# Patient Record
Sex: MALE | Race: WHITE | Employment: OTHER | URBAN - METROPOLITAN AREA
[De-identification: names, ages, dates, MRNs, and addresses within clinical notes are randomized per-mention and may not be internally consistent; named-entity substitution may affect disease eponyms.]

---

## 2017-01-05 ENCOUNTER — PATIENT OUTREACH (OUTPATIENT)
Dept: CASE MANAGEMENT | Age: 67
End: 2017-01-05

## 2017-03-01 PROBLEM — I47.2 VENTRICULAR TACHYARRHYTHMIA (HCC): Status: ACTIVE | Noted: 2017-03-01

## 2017-03-01 PROBLEM — I47.20 VENTRICULAR TACHYARRHYTHMIA (HCC): Status: ACTIVE | Noted: 2017-03-01

## 2017-03-01 PROBLEM — I47.20 VENTRICULAR TACHYARRHYTHMIA: Status: ACTIVE | Noted: 2017-03-01

## 2017-03-01 PROBLEM — Z95.810 CARDIAC DEFIBRILLATOR IN PLACE: Status: ACTIVE | Noted: 2017-03-01

## 2017-03-01 PROBLEM — I42.0 DCM (DILATED CARDIOMYOPATHY) (HCC): Status: ACTIVE | Noted: 2017-03-01

## 2017-04-06 ENCOUNTER — HOSPITAL ENCOUNTER (OUTPATIENT)
Dept: CV DIAGNOSTICS | Facility: HOSPITAL | Age: 67
Discharge: HOME | End: 2017-04-06
Attending: INTERNAL MEDICINE
Payer: MEDICARE

## 2017-04-06 DIAGNOSIS — Z95.810 CARDIAC DEFIBRILLATOR IN PLACE: ICD-10-CM

## 2017-04-06 DIAGNOSIS — G35 MULTIPLE SCLEROSIS (HCC): ICD-10-CM

## 2017-04-06 DIAGNOSIS — I42.0 DCM (DILATED CARDIOMYOPATHY) (HCC): ICD-10-CM

## 2017-04-06 DIAGNOSIS — I47.2 VENTRICULAR TACHYARRHYTHMIA (HCC): ICD-10-CM

## 2017-04-06 DIAGNOSIS — I25.10 ATHEROSCLEROSIS OF NATIVE CORONARY ARTERY OF NATIVE HEART WITHOUT ANGINA PECTORIS: ICD-10-CM

## 2017-04-06 PROCEDURE — 93306 TTE W/DOPPLER COMPLETE: CPT

## 2017-04-06 PROCEDURE — 93306 TTE W/DOPPLER COMPLETE: CPT | Performed by: INTERNAL MEDICINE

## 2017-05-19 ENCOUNTER — APPOINTMENT (OUTPATIENT)
Dept: INTERVENTIONAL RADIOLOGY/VASCULAR | Facility: HOSPITAL | Age: 67
DRG: 247 | End: 2017-05-19
Attending: INTERNAL MEDICINE
Payer: MEDICARE

## 2017-05-19 ENCOUNTER — APPOINTMENT (OUTPATIENT)
Dept: CV DIAGNOSTICS | Facility: HOSPITAL | Age: 67
DRG: 247 | End: 2017-05-19
Attending: INTERNAL MEDICINE
Payer: MEDICARE

## 2017-05-19 ENCOUNTER — APPOINTMENT (OUTPATIENT)
Dept: GENERAL RADIOLOGY | Facility: HOSPITAL | Age: 67
DRG: 247 | End: 2017-05-19
Attending: STUDENT IN AN ORGANIZED HEALTH CARE EDUCATION/TRAINING PROGRAM
Payer: MEDICARE

## 2017-05-19 ENCOUNTER — HOSPITAL ENCOUNTER (INPATIENT)
Facility: HOSPITAL | Age: 67
LOS: 2 days | Discharge: HOME | DRG: 247 | End: 2017-05-21
Attending: STUDENT IN AN ORGANIZED HEALTH CARE EDUCATION/TRAINING PROGRAM | Admitting: HOSPITALIST
Payer: MEDICARE

## 2017-05-19 DIAGNOSIS — R07.9 ACUTE CHEST PAIN: Primary | ICD-10-CM

## 2017-05-19 DIAGNOSIS — R06.00 DYSPNEA, UNSPECIFIED TYPE: ICD-10-CM

## 2017-05-19 DIAGNOSIS — R77.8 ELEVATED TROPONIN: ICD-10-CM

## 2017-05-19 PROBLEM — R79.89 ELEVATED TROPONIN: Status: ACTIVE | Noted: 2017-05-19

## 2017-05-19 PROBLEM — Z91.81 AT RISK FOR FALLING: Status: ACTIVE | Noted: 2017-05-19

## 2017-05-19 PROCEDURE — B2121ZZ FLUOROSCOPY OF SINGLE CORONARY ARTERY BYPASS GRAFT USING LOW OSMOLAR CONTRAST: ICD-10-PCS | Performed by: INTERNAL MEDICINE

## 2017-05-19 PROCEDURE — 93306 TTE W/DOPPLER COMPLETE: CPT | Performed by: INTERNAL MEDICINE

## 2017-05-19 PROCEDURE — 71010 XR CHEST AP PORTABLE  (CPT=71010): CPT | Performed by: STUDENT IN AN ORGANIZED HEALTH CARE EDUCATION/TRAINING PROGRAM

## 2017-05-19 PROCEDURE — 4A023N7 MEASUREMENT OF CARDIAC SAMPLING AND PRESSURE, LEFT HEART, PERCUTANEOUS APPROACH: ICD-10-PCS | Performed by: INTERNAL MEDICINE

## 2017-05-19 PROCEDURE — 3E033GC INTRODUCTION OF OTHER THERAPEUTIC SUBSTANCE INTO PERIPHERAL VEIN, PERCUTANEOUS APPROACH: ICD-10-PCS | Performed by: INTERNAL MEDICINE

## 2017-05-19 PROCEDURE — 027135Z DILATION OF CORONARY ARTERY, TWO ARTERIES WITH TWO DRUG-ELUTING INTRALUMINAL DEVICES, PERCUTANEOUS APPROACH: ICD-10-PCS | Performed by: INTERNAL MEDICINE

## 2017-05-19 PROCEDURE — B2181ZZ FLUOROSCOPY OF LEFT INTERNAL MAMMARY BYPASS GRAFT USING LOW OSMOLAR CONTRAST: ICD-10-PCS | Performed by: INTERNAL MEDICINE

## 2017-05-19 PROCEDURE — 99223 1ST HOSP IP/OBS HIGH 75: CPT | Performed by: INTERNAL MEDICINE

## 2017-05-19 PROCEDURE — B2111ZZ FLUOROSCOPY OF MULTIPLE CORONARY ARTERIES USING LOW OSMOLAR CONTRAST: ICD-10-PCS | Performed by: INTERNAL MEDICINE

## 2017-05-19 RX ORDER — HYDROCODONE BITARTRATE AND ACETAMINOPHEN 5; 325 MG/1; MG/1
2 TABLET ORAL EVERY 4 HOURS PRN
Status: DISCONTINUED | OUTPATIENT
Start: 2017-05-19 | End: 2017-05-21

## 2017-05-19 RX ORDER — SODIUM PHOSPHATE, DIBASIC AND SODIUM PHOSPHATE, MONOBASIC 7; 19 G/133ML; G/133ML
1 ENEMA RECTAL ONCE AS NEEDED
Status: DISCONTINUED | OUTPATIENT
Start: 2017-05-19 | End: 2017-05-21

## 2017-05-19 RX ORDER — ASPIRIN 325 MG
325 TABLET ORAL DAILY
Status: DISCONTINUED | OUTPATIENT
Start: 2017-05-19 | End: 2017-05-21

## 2017-05-19 RX ORDER — GABAPENTIN 600 MG/1
600 TABLET ORAL DAILY
Status: DISCONTINUED | OUTPATIENT
Start: 2017-05-19 | End: 2017-05-19

## 2017-05-19 RX ORDER — AMIODARONE HYDROCHLORIDE 100 MG/1
100 TABLET ORAL DAILY
Status: DISCONTINUED | OUTPATIENT
Start: 2017-05-19 | End: 2017-05-21

## 2017-05-19 RX ORDER — MORPHINE SULFATE 2 MG/ML
INJECTION, SOLUTION INTRAMUSCULAR; INTRAVENOUS
Status: COMPLETED
Start: 2017-05-19 | End: 2017-05-19

## 2017-05-19 RX ORDER — MORPHINE SULFATE 4 MG/ML
4 INJECTION, SOLUTION INTRAMUSCULAR; INTRAVENOUS ONCE
Status: COMPLETED | OUTPATIENT
Start: 2017-05-19 | End: 2017-05-19

## 2017-05-19 RX ORDER — DOCUSATE SODIUM 100 MG/1
100 CAPSULE, LIQUID FILLED ORAL DAILY
Status: ON HOLD | COMMUNITY
End: 2017-05-19

## 2017-05-19 RX ORDER — MELATONIN
200 DAILY
Status: DISCONTINUED | OUTPATIENT
Start: 2017-05-19 | End: 2017-05-19

## 2017-05-19 RX ORDER — FUROSEMIDE 20 MG/1
40 TABLET ORAL DAILY
Status: ON HOLD | COMMUNITY
End: 2017-05-21

## 2017-05-19 RX ORDER — CLOPIDOGREL BISULFATE 75 MG/1
TABLET ORAL
Status: COMPLETED
Start: 2017-05-19 | End: 2017-05-19

## 2017-05-19 RX ORDER — HEPARIN SODIUM 5000 [USP'U]/ML
5000 INJECTION INTRAVENOUS; SUBCUTANEOUS ONCE
Status: COMPLETED | OUTPATIENT
Start: 2017-05-19 | End: 2017-05-19

## 2017-05-19 RX ORDER — ACETAMINOPHEN AND CODEINE PHOSPHATE 300; 30 MG/1; MG/1
1 TABLET ORAL EVERY 6 HOURS PRN
Status: ON HOLD | COMMUNITY
End: 2017-05-19

## 2017-05-19 RX ORDER — MORPHINE SULFATE 4 MG/ML
4 INJECTION, SOLUTION INTRAMUSCULAR; INTRAVENOUS EVERY 2 HOUR PRN
Status: DISCONTINUED | OUTPATIENT
Start: 2017-05-19 | End: 2017-05-21

## 2017-05-19 RX ORDER — NITROGLYCERIN 0.4 MG/1
0.4 TABLET SUBLINGUAL EVERY 5 MIN PRN
Status: COMPLETED | OUTPATIENT
Start: 2017-05-19 | End: 2017-05-19

## 2017-05-19 RX ORDER — MELATONIN
200 DAILY
Status: DISCONTINUED | OUTPATIENT
Start: 2017-05-19 | End: 2017-05-21

## 2017-05-19 RX ORDER — LIDOCAINE HYDROCHLORIDE 10 MG/ML
INJECTION, SOLUTION INFILTRATION; PERINEURAL
Status: COMPLETED
Start: 2017-05-19 | End: 2017-05-19

## 2017-05-19 RX ORDER — NITROGLYCERIN 0.4 MG/1
0.4 TABLET SUBLINGUAL EVERY 5 MIN PRN
Status: DISCONTINUED | OUTPATIENT
Start: 2017-05-19 | End: 2017-05-21

## 2017-05-19 RX ORDER — NITROGLYCERIN 20 MG/100ML
INJECTION INTRAVENOUS
Status: DISCONTINUED | OUTPATIENT
Start: 2017-05-19 | End: 2017-05-21

## 2017-05-19 RX ORDER — SODIUM CHLORIDE 9 MG/ML
INJECTION, SOLUTION INTRAVENOUS CONTINUOUS
Status: DISCONTINUED | OUTPATIENT
Start: 2017-05-19 | End: 2017-05-21

## 2017-05-19 RX ORDER — MORPHINE SULFATE 2 MG/ML
1 INJECTION, SOLUTION INTRAMUSCULAR; INTRAVENOUS EVERY 2 HOUR PRN
Status: DISCONTINUED | OUTPATIENT
Start: 2017-05-19 | End: 2017-05-21

## 2017-05-19 RX ORDER — LOSARTAN POTASSIUM 25 MG/1
25 TABLET ORAL DAILY
COMMUNITY
End: 2017-11-08

## 2017-05-19 RX ORDER — ASPIRIN 325 MG
325 TABLET ORAL DAILY
Qty: 30 TABLET | Refills: 0 | Status: SHIPPED | COMMUNITY
Start: 2017-05-19 | End: 2019-09-18

## 2017-05-19 RX ORDER — MELATONIN
600 DAILY
COMMUNITY
End: 2018-09-07 | Stop reason: ALTCHOICE

## 2017-05-19 RX ORDER — HEPARIN SODIUM 5000 [USP'U]/ML
INJECTION, SOLUTION INTRAVENOUS; SUBCUTANEOUS
Status: COMPLETED
Start: 2017-05-19 | End: 2017-05-19

## 2017-05-19 RX ORDER — AMIODARONE HYDROCHLORIDE 200 MG/1
200 TABLET ORAL DAILY
Status: ON HOLD | COMMUNITY
End: 2017-05-21

## 2017-05-19 RX ORDER — SODIUM CHLORIDE 9 MG/ML
INJECTION, SOLUTION INTRAVENOUS CONTINUOUS
Status: ACTIVE | OUTPATIENT
Start: 2017-05-19 | End: 2017-05-19

## 2017-05-19 RX ORDER — METOPROLOL TARTRATE 50 MG/1
50 TABLET, FILM COATED ORAL
Status: DISCONTINUED | OUTPATIENT
Start: 2017-05-19 | End: 2017-05-20

## 2017-05-19 RX ORDER — ACETAMINOPHEN 325 MG/1
650 TABLET ORAL EVERY 4 HOURS PRN
Status: DISCONTINUED | OUTPATIENT
Start: 2017-05-19 | End: 2017-05-21

## 2017-05-19 RX ORDER — SODIUM CHLORIDE 9 MG/ML
INJECTION, SOLUTION INTRAVENOUS ONCE
Status: COMPLETED | OUTPATIENT
Start: 2017-05-19 | End: 2017-05-19

## 2017-05-19 RX ORDER — MIDAZOLAM HYDROCHLORIDE 1 MG/ML
INJECTION INTRAMUSCULAR; INTRAVENOUS
Status: COMPLETED
Start: 2017-05-19 | End: 2017-05-19

## 2017-05-19 RX ORDER — POTASSIUM CHLORIDE 20 MEQ/1
40 TABLET, EXTENDED RELEASE ORAL EVERY 4 HOURS
Status: COMPLETED | OUTPATIENT
Start: 2017-05-19 | End: 2017-05-19

## 2017-05-19 RX ORDER — CLOPIDOGREL BISULFATE 75 MG/1
75 TABLET ORAL DAILY
Status: DISCONTINUED | OUTPATIENT
Start: 2017-05-20 | End: 2017-05-21

## 2017-05-19 RX ORDER — HEPARIN SODIUM AND DEXTROSE 10000; 5 [USP'U]/100ML; G/100ML
1000 INJECTION INTRAVENOUS ONCE
Status: COMPLETED | OUTPATIENT
Start: 2017-05-19 | End: 2017-05-19

## 2017-05-19 RX ORDER — ASPIRIN 325 MG
325 TABLET ORAL DAILY
Status: DISCONTINUED | OUTPATIENT
Start: 2017-05-19 | End: 2017-05-19

## 2017-05-19 RX ORDER — CLOPIDOGREL BISULFATE 75 MG/1
75 TABLET ORAL DAILY
Qty: 30 TABLET | Refills: 11 | Status: SHIPPED | OUTPATIENT
Start: 2017-05-20 | End: 2018-07-09

## 2017-05-19 RX ORDER — ACETAMINOPHEN 500 MG
1000 TABLET ORAL ONCE
Status: COMPLETED | OUTPATIENT
Start: 2017-05-19 | End: 2017-05-19

## 2017-05-19 RX ORDER — FUROSEMIDE 40 MG/1
40 TABLET ORAL
Status: DISCONTINUED | OUTPATIENT
Start: 2017-05-19 | End: 2017-05-21

## 2017-05-19 RX ORDER — ACETAMINOPHEN 325 MG/1
650 TABLET ORAL EVERY 6 HOURS PRN
Status: DISCONTINUED | OUTPATIENT
Start: 2017-05-19 | End: 2017-05-21

## 2017-05-19 RX ORDER — ATORVASTATIN CALCIUM 20 MG/1
20 TABLET, FILM COATED ORAL NIGHTLY
Status: DISCONTINUED | OUTPATIENT
Start: 2017-05-19 | End: 2017-05-19

## 2017-05-19 RX ORDER — ONDANSETRON 2 MG/ML
4 INJECTION INTRAMUSCULAR; INTRAVENOUS ONCE
Status: COMPLETED | OUTPATIENT
Start: 2017-05-19 | End: 2017-05-19

## 2017-05-19 RX ORDER — POLYETHYLENE GLYCOL 3350 17 G/17G
17 POWDER, FOR SOLUTION ORAL DAILY PRN
Status: DISCONTINUED | OUTPATIENT
Start: 2017-05-19 | End: 2017-05-21

## 2017-05-19 RX ORDER — ASPIRIN 81 MG/1
324 TABLET, CHEWABLE ORAL ONCE
Status: DISCONTINUED | OUTPATIENT
Start: 2017-05-19 | End: 2017-05-19

## 2017-05-19 RX ORDER — ATORVASTATIN CALCIUM 20 MG/1
20 TABLET, FILM COATED ORAL NIGHTLY
Status: ON HOLD | COMMUNITY
End: 2017-05-19

## 2017-05-19 RX ORDER — LOSARTAN POTASSIUM 25 MG/1
25 TABLET ORAL
Status: DISCONTINUED | OUTPATIENT
Start: 2017-05-19 | End: 2017-05-21

## 2017-05-19 RX ORDER — BISACODYL 10 MG
10 SUPPOSITORY, RECTAL RECTAL
Status: DISCONTINUED | OUTPATIENT
Start: 2017-05-19 | End: 2017-05-21

## 2017-05-19 RX ORDER — DOCUSATE SODIUM 100 MG/1
100 CAPSULE, LIQUID FILLED ORAL 2 TIMES DAILY PRN
Status: DISCONTINUED | OUTPATIENT
Start: 2017-05-19 | End: 2017-05-21

## 2017-05-19 RX ORDER — ONDANSETRON 2 MG/ML
4 INJECTION INTRAMUSCULAR; INTRAVENOUS EVERY 6 HOURS PRN
Status: DISCONTINUED | OUTPATIENT
Start: 2017-05-19 | End: 2017-05-21

## 2017-05-19 RX ORDER — HYDROCODONE BITARTRATE AND ACETAMINOPHEN 5; 325 MG/1; MG/1
1 TABLET ORAL EVERY 4 HOURS PRN
Status: DISCONTINUED | OUTPATIENT
Start: 2017-05-19 | End: 2017-05-21

## 2017-05-19 RX ORDER — MORPHINE SULFATE 2 MG/ML
2 INJECTION, SOLUTION INTRAMUSCULAR; INTRAVENOUS EVERY 2 HOUR PRN
Status: DISCONTINUED | OUTPATIENT
Start: 2017-05-19 | End: 2017-05-21

## 2017-05-19 RX ORDER — GABAPENTIN 600 MG/1
600 TABLET ORAL DAILY
Status: ON HOLD | COMMUNITY
End: 2017-05-19

## 2017-05-19 RX ORDER — HEPARIN SODIUM 5000 [USP'U]/ML
5000 INJECTION, SOLUTION INTRAVENOUS; SUBCUTANEOUS EVERY 8 HOURS SCHEDULED
Status: CANCELLED | OUTPATIENT
Start: 2017-05-19

## 2017-05-19 RX ORDER — ACEBUTOLOL HYDROCHLORIDE 200 MG/1
200 CAPSULE ORAL 3 TIMES DAILY
Status: ON HOLD | COMMUNITY
End: 2017-05-21

## 2017-05-19 RX ORDER — HEPARIN SODIUM AND DEXTROSE 10000; 5 [USP'U]/100ML; G/100ML
INJECTION INTRAVENOUS CONTINUOUS
Status: DISCONTINUED | OUTPATIENT
Start: 2017-05-19 | End: 2017-05-20

## 2017-05-19 RX ORDER — ATORVASTATIN CALCIUM 40 MG/1
40 TABLET, FILM COATED ORAL NIGHTLY
Status: DISCONTINUED | OUTPATIENT
Start: 2017-05-19 | End: 2017-05-21

## 2017-05-19 RX ORDER — AMIODARONE HYDROCHLORIDE 100 MG/1
100 TABLET ORAL DAILY
Status: DISCONTINUED | OUTPATIENT
Start: 2017-05-19 | End: 2017-05-19

## 2017-05-19 NOTE — DIETARY NOTE
Nutrition Short Note     Dietitian consult received per cardiac rehab standing order. Pt to be educated by cardiac rehab staff and encouraged to attend outpatient classes taught by RD. RD available PRN.     Debbie Hinton  Dietetic Intern

## 2017-05-19 NOTE — ED INITIAL ASSESSMENT (HPI)
Arrived via EMS for c/o CP that woke him up PTA. Also reports ALLISON.  Pt given Nitro SL with some relief

## 2017-05-19 NOTE — H&P
[facility] HOSPITALIST  History and Physical     River Salinas Patient Status:  Emergency    10/16/1950 MRN [id number]   Location [facility] Attending Collin Barnett MD   Hosp Day # 0 PCP Janeth Amos MD     Chief Complai Peptic ulcer, unspecified site, unspecified as acute or chronic, without mention of hemorrhage, perforation, or obstruction 6/29/2012        Past Surgical History:     Past Surgical History    APPENDECTOMY  1991    CABG  1998    SURGICAL STENT  2010    INJ Jesus Reyes MD;  Location: [address] [place] [address] NDL/CATH SPI DX/THER NJX  8/14/2013    Comment Procedure: LUMBAR EPIDURAL;  Surgeon: Yuridia Varner MD;  Location: [address] [place] [address] (GLUCOSAMINE-CHONDROITIN) Oral Tab Take 1 capsule by mouth 2 (two) times daily. Disp:  Rfl:    furosemide 20 MG Oral Tab Take 2 tablets (40 mg total) by mouth once daily.  Disp: 180 tablet Rfl: 3   amiodarone HCl 200 MG Oral Tab Take 0.5 tablets (100 mg tot mood and affect.       Diagnostic Data:      Labs:  Recent Labs   Lab  05/19/17   0302   WBC  11.8   HGB  14.8   MCV  93.9   PLT  240.0   INR  0.99       Recent Labs   Lab  05/19/17   0302   GLU  95   BUN  15   CREATSERUM  1.11   CA  8.9   ALB  3.9   NA  14

## 2017-05-19 NOTE — BH RN ADMISSION NOTE
Received patient from ED, accompanied by his wife. He is alert and oriented. Complaining of chest pressure of 3/10. He is on nitro drip at 15mcg/min., heparin drip and on O2. Morphine for pain was given. Needs attended.

## 2017-05-19 NOTE — CONSULTS
[facility] Cardiology  Report of Consultation    Maki Bo Patient Status:  Observation    10/16/1950 MRN [id number]   [facility] 2NE-A Attending Alvaro Lainez MD   Hosp Day # 0 PCP Naila Soni MD     Reason for Consu reference range     Chronic atrial fibrillation (HCC)     Chronic bilateral back pain     Obesity     Hypokalemia     Elevated troponin     Dyspnea, unspecified type     At risk for falling      Social History:   Smoking:  None  Alcohol:  None    Family Hi daily.  ) Disp: 45 tablet Rfl: 3   Acebutolol HCl 200 MG Oral Cap TAKE ONE CAPSULE BY MOUTH THREE TIMES DAILY Disp: 270 capsule Rfl: 3   Calcium Carbonate (CALCIUM 600 OR) Take 1 Tab by mouth daily.  Disp:  Rfl:    Natalizumab (TYSABRI IV) Inject  into the symmetric fashion. Neurologic: Alert and oriented, normal affect. No gross deficit appreciated. Integument:  No visible rashes are appreciated.       Laboratories and Data:   Labs:    Recent Labs   Lab  05/19/17   0302   GLU  95   BUN  15   CREATSERUM  1 K 3.5 05/19/2017    05/19/2017   CO2 25.0 05/19/2017   GLU 95 05/19/2017   CA 8.9 05/19/2017   ALB 3.9 05/19/2017   ALKPHO 95 05/19/2017   BILT 0.5 05/19/2017   TP 7.3 05/19/2017   AST 36 05/19/2017   ALT 36 05/19/2017   PTT 29.8 05/19/2017   INR 0

## 2017-05-19 NOTE — PROGRESS NOTES
Pt seen and examined. Feels ok. Minimal pain. Echo today. Possible cath. Cont. Heparin/nitro and PRN morphine.     Claire Sommers MD

## 2017-05-19 NOTE — ED PROVIDER NOTES
Patient Seen in: [facility] Emergency Department    History   Patient presents with:  Chest Pain Angina (cardiovascular)    Stated Complaint: c/o CP which woke him up    HPI    Patient is a 24-year-old male presenting to emergency department with ch 2010    INJECTION, ANESTHETIC/STEROID, TRANSFORAMINAL EPIDURAL; LUMBAR/SACRAL, SINGLE LEVEL  7/12/2013    Comment Procedure: TRANSFORAMINAL EPIDURAL - LUMBAR;  Surgeon: Cherylene Deters, MD;  Location: [address] [place] [address] PREOPERATIVE ORDER FOR IV ANTIBIOTIC SURGICAL SITE INFECTION PROPHYLAXIS.   8/14/2013    Comment Procedure: LUMBAR EPIDURAL;  Surgeon: Yuridia Varner MD;  Location: [facility] FOR PAIN MANAGEMENT    PATIENT DOCUMENTED NOT TO HAVE EXPERIENCED ANY OF THE FOL CR,  Take 1 Tab by mouth 2 (two) times daily. Cholecalciferol (VITAMIN D OR),  Take 4,000 Units by mouth. FOLIC ACID OR,  675 mg daily.        Family History   Problem Relation Age of Onset   • Arthritis Mother    • Heart Disease Father       and no mass. There is no tenderness. There is no rebound and no guarding. Musculoskeletal: Normal range of motion, no edema. Neurological: alert and oriented to person, place, and time. Skin: Skin is warm and dry. No rash noted.    Psychiatric: normal axis, nonspecific ST-T wave abnormality noted without significant change from EKG performed on September 29, 2012. EKG    Rate, intervals and axes as noted on EKG Report.   Rate: 67  Rhythm: Sinus Rhythm  Reading: Normal sinus rhythm with occasiona Unknown

## 2017-05-19 NOTE — PROGRESS NOTES
05/19/17 0600   Provider Notification   Reason for Communication Critical value  (chest pressure)   Provider Name Dr. Monica Rojas   Method of Communication Page   Response (answered by delfina calhoun. venkatesh pt. Jose Cruz Salguero. )   Notification Time 0647

## 2017-05-19 NOTE — PROGRESS NOTES
Cath:    LM: Mild disease. LAD: Occluded. Fills via GARY. RCA: Occluded. CX: Dominant. Ruptured proximal plaque, 75% stenosis, then 95% into OM1 (large branch), then AV groove CX large to left TIGRE/PDA. SVG: Occluded. GARY: Patent to moderate LAD.     P

## 2017-05-20 PROCEDURE — 99232 SBSQ HOSP IP/OBS MODERATE 35: CPT | Performed by: HOSPITALIST

## 2017-05-20 RX ORDER — ATORVASTATIN CALCIUM 40 MG/1
40 TABLET, FILM COATED ORAL NIGHTLY
Qty: 30 TABLET | Refills: 1 | Status: SHIPPED | OUTPATIENT
Start: 2017-05-20 | End: 2017-07-12

## 2017-05-20 NOTE — PROGRESS NOTES
[facility] HOSPITALIST  Progress Note     Karendaniellaruben Marcum Patient Status:  Inpatient    10/16/1950 MRN [id number]   [facility] 2NE-A Attending Javad Cano MD   Hosp Day # 1 PCP Ash Palacios MD     Chief Complaint: NSTEMI    S: Patient f • furosemide  40 mg Oral Daily   • Losartan Potassium  25 mg Oral Daily   • folic acid  754 mcg Oral Daily   • aspirin  325 mg Oral Daily   • amiodarone HCl  100 mg Oral Daily   • Atorvastatin Calcium  40 mg Oral Nightly   • Clopidogrel Bisulfate  75 mg

## 2017-05-20 NOTE — PHYSICAL THERAPY NOTE
PHYSICAL THERAPY QUICK EVALUATION - INPATIENT    Room Number: 9504/0703-Z  Evaluation Date: 5/20/2017  Presenting Problem: Acute Chest Pain, NSTEMI, s/p stenting  Physician Order: PT Eval and Treat    Problem List  Principal Problem:    Acute chest pain  A TRANSFORAMINAL EPIDURAL - LUMBAR;  Surgeon: Judi White MD;  Location: [facility] FOR PAIN MANAGEMENT    PATIENT DOCUMENTED NOT TO HAVE EXPERIENCED ANY OF THE FOLLOWING EVENTS  7/12/2013    Comment Procedure: TRANSFORAMINAL EPIDURAL - LUMBAR;  Surgeon: Go[facility] FOR PAIN MANAGEMENT    CARDIAC DEFIBRILLATOR PLACEMENT      Comment [employer], not pacer dependent.   Magnet response inhibit therapy    OPEN HEART SURGERY (PBP)      Comment 1998 4CABG X 4    COLONOSCOPY  1/2016    Comment Dr Alexander Meyer due in 5 y None   -   Moving from lying on back to sitting on the side of the bed?: A Little   How much help from another person does the patient currently need. ..   -   Moving to and from a bed to a chair (including a wheelchair)?: None   -   Need to walk in hospita that patients with this level of impairment may be safe for discharge home. The patient ambulates with self-selected gait speed = .45 meters/second.  This correlates with limited community ambulator status and need for intervention in order to reduce fall

## 2017-05-20 NOTE — CARDIAC REHAB
Cardiac rehab stent education completed with patient. Stent card to be mailed to patient. Will call for cardiac rehab appointment.

## 2017-05-20 NOTE — RESPIRATORY THERAPY NOTE
DON - Equipment Use Daily Summary:                  . Set Mode: CPAP                . Usage in hours: 1:49                . 90% Pressure (EPAP) level: 14                . 90% Insp. Pressure (IPAP): Varinder Mckee AHI: 19                .  Supplemental Oxyg

## 2017-05-20 NOTE — PROGRESS NOTES
[facility] Cardiology  Progress Note    Belynda Forward Patient Status:  Inpatient    10/16/1950 MRN [id number]   [facility] 2NE-A Attending Michelle Underwood MD   Hosp Day # 1 PCP Traci Pascual MD     Subjective:   No further ch General:  Well-developed / Well-nourished. No acute distress. HEENT:  Normocephalic. Atraumatic. No icterus. Neck:  There is no jugular venous distention. Cardiovascular:  Cardiovascular examination demonstrates a regular rate and rhythm.   There i disease. LAD: Occluded.  Fills via GARY. RCA: Occluded. CX: Dominant.  Ruptured proximal plaque, 75% stenosis, then 95% into OM1 (large branch), then AV groove CX large to left TIGRE/PDA. SVG: Occluded. GARY: Patent to moderate LAD.     PCI: EBU.  DONNA, pro

## 2017-05-21 VITALS
BODY MASS INDEX: 38.65 KG/M2 | HEIGHT: 68 IN | RESPIRATION RATE: 18 BRPM | WEIGHT: 255 LBS | OXYGEN SATURATION: 98 % | HEART RATE: 78 BPM | DIASTOLIC BLOOD PRESSURE: 74 MMHG | TEMPERATURE: 98 F | SYSTOLIC BLOOD PRESSURE: 141 MMHG

## 2017-05-21 PROCEDURE — 99239 HOSP IP/OBS DSCHRG MGMT >30: CPT | Performed by: HOSPITALIST

## 2017-05-21 RX ORDER — AMIODARONE HYDROCHLORIDE 100 MG/1
100 TABLET ORAL DAILY
Qty: 30 TABLET | Refills: 0 | Status: SHIPPED | OUTPATIENT
Start: 2017-05-21 | End: 2017-06-02

## 2017-05-21 RX ORDER — FUROSEMIDE 40 MG/1
40 TABLET ORAL
Qty: 30 TABLET | Refills: 0 | Status: ON HOLD | OUTPATIENT
Start: 2017-05-21 | End: 2018-10-06

## 2017-05-21 NOTE — DISCHARGE PLANNING
Discharge medications and home care reviewed,scripts given for home medications; follow up appointments and groin care and complications discussed.   Verbalized understanding of information given

## 2017-05-21 NOTE — PROGRESS NOTES
[facility] Group Cardiology  Progress Note    Bere Chin Patient Status:  Inpatient    10/16/1950 MRN [id number]   [facility] 2NE-A Attending Silvana Gupta MD   Hosp Day # 2 PCP Luciana Rico MD     Subjective:   No further ch Well-developed / Well-nourished. No acute distress. HEENT:  Normocephalic. Atraumatic. No icterus. Neck:  There is no jugular venous distention. Cardiovascular:  Cardiovascular examination demonstrates a regular rate and rhythm.   There is normal S1, Occluded.  Fills via GARY. RCA: Occluded. CX: Dominant.  Ruptured proximal plaque, 75% stenosis, then 95% into OM1 (large branch), then AV groove CX large to left TIGRE/PDA. SVG: Occluded. GARY: Patent to moderate LAD.     PCI: EBU.  audelia THOMPSON.  2.5x12,

## 2017-05-21 NOTE — RESPIRATORY THERAPY NOTE
DON - Equipment Use Daily Summary:  · Set Mode CFLEX  · Usage in hours: 9:21  · 90% Pressure (EPAP) level:   · 90% Insp Pressure (IPAP): 14  · AHI: 13  · Supplemental Oxygen:2  · Comments:

## 2017-05-22 ENCOUNTER — PATIENT OUTREACH (OUTPATIENT)
Dept: CASE MANAGEMENT | Age: 67
End: 2017-05-22

## 2017-05-22 DIAGNOSIS — Z02.9 ENCOUNTERS FOR ADMINISTRATIVE PURPOSE: Primary | ICD-10-CM

## 2017-05-22 NOTE — PROGRESS NOTES
Initial Post Discharge Follow Up   Discharge Date: 5/21/17  Contact Date: 5/22/2017    Consent Verification:  Assessment Completed With: Patient  HIPAA Verified?   Yes    Discharge Dx:  Acute MI     General:   • How have you been since your discharge fro Take 1 capsule by mouth daily. Disp:  Rfl:    Calcium Carbonate (CALCIUM 600 OR) Take 1 Tab by mouth daily. Disp:  Rfl:    Natalizumab (TYSABRI IV) Inject  into the vein.  monthly Disp:  Rfl:    Multiple Vitamin (MULTI VITAMIN MENS OR) Take 1 Tab by mouth no--not ordered by cardio per pt.       Needs post D/C:   Now that you are home, are there any needs or concerns you need addressed before your next visit with your PCP?  (DME, meds, disease concerns, Etc): No     Follow up appointments:   All HFU appts re go to ER/call 911. He verbalized understanding and will contact office with any further questions or concerns. [x]  Discharge Summary, medications reviewed/discussed/and reconciled with patient, and orders reviewed and discussed.

## 2017-05-22 NOTE — DISCHARGE SUMMARY
[facility] HOSPITALIST  DISCHARGE SUMMARY     Bere Chin Patient Status:  Inpatient    10/16/1950 MRN [id number]   [facility] 2NE-A Attending No att. providers found   [facility] Day # 2 PCP Luciana Rico MD     Date of Admission: 2017 myocardial infarction. He was seen by cardiology service. He was placed on heparin and nitro. He had a cardiac catheterization on 5/19/2017. The patient had PCI to lesions in the left circumflex and OM distribution.   He was found to have ischemic cardi changed:    - medication strength  - how much to take        Take 1 tablet (40 mg total) by mouth nightly.     Quantity:  30 tablet   Refills:  1       furosemide 40 MG Tabs   Last time this was given:  40 mg on 5/21/2017  9:12 AM   Commonly known as:  PARISH prescriptions at the location directed by your doctor or nurse     Bring a paper prescription for each of these medications    - atorvastatin 40 MG Tabs  - furosemide 40 MG Tabs  - metoprolol Tartrate 25 MG Tabs      Information about where to get these me

## 2017-05-26 ENCOUNTER — OFFICE VISIT (OUTPATIENT)
Dept: FAMILY MEDICINE CLINIC | Facility: CLINIC | Age: 67
End: 2017-05-26

## 2017-05-26 VITALS
TEMPERATURE: 99 F | BODY MASS INDEX: 40.97 KG/M2 | DIASTOLIC BLOOD PRESSURE: 68 MMHG | WEIGHT: 261 LBS | RESPIRATION RATE: 16 BRPM | HEART RATE: 68 BPM | SYSTOLIC BLOOD PRESSURE: 120 MMHG | HEIGHT: 67 IN

## 2017-05-26 DIAGNOSIS — E78.00 PURE HYPERCHOLESTEROLEMIA: ICD-10-CM

## 2017-05-26 DIAGNOSIS — I10 BENIGN ESSENTIAL HTN: ICD-10-CM

## 2017-05-26 DIAGNOSIS — I21.4 NSTEMI (NON-ST ELEVATED MYOCARDIAL INFARCTION) (HCC): Primary | ICD-10-CM

## 2017-05-26 DIAGNOSIS — I50.22 SYSTOLIC CHF, CHRONIC (HCC): ICD-10-CM

## 2017-05-26 DIAGNOSIS — I42.0 DCM (DILATED CARDIOMYOPATHY) (HCC): ICD-10-CM

## 2017-05-26 DIAGNOSIS — I25.110 CORONARY ARTERY DISEASE INVOLVING NATIVE CORONARY ARTERY OF NATIVE HEART WITH UNSTABLE ANGINA PECTORIS (HCC): ICD-10-CM

## 2017-05-26 DIAGNOSIS — G35 MULTIPLE SCLEROSIS (HCC): ICD-10-CM

## 2017-05-26 DIAGNOSIS — Z95.1 S/P CABG (CORONARY ARTERY BYPASS GRAFT): ICD-10-CM

## 2017-05-26 PROCEDURE — 99495 TRANSJ CARE MGMT MOD F2F 14D: CPT | Performed by: FAMILY MEDICINE

## 2017-05-26 NOTE — PATIENT INSTRUCTIONS
Continue current meds. Watch diet for fats and carbs. Stay active. Follow-up with cardiologist as scheduled. Schedule Medicare wellness visit after Halloween this year.

## 2017-05-26 NOTE — PROGRESS NOTES
HPI:    Margaret Middleton is a 74year old male here today for hospital follow up.    He was discharged from Inpatient hospital, [facility] to Home   Admission Date: 5/19/17   Discharge Date: 5/21/17  Hospital Discharge Diagnosis: No Value exists for the He is doing well with those medications. Currently he is doing good asymptomatic denies any chest pain no shortness of breath no palpitations. Hypertension stable medication patient is doing well. Patient's coronary artery disease status post CABG. system; Herpes zoster without mention of complication; Personal history of methicillin resistant Staphylococcus aureus; Multiple sclerosis ([facility] [address] Obesity, unspecified; Other specified disorder of rectum and anus; Unspecified sleep apnea;  Unspecified urinar history (2000); and other surgical history (1/2016). He family history includes Arthritis in his mother; Heart Disease in his father; Heart Disorder in his father and paternal grandmother. He  reports that he quit smoking about 39 years ago.  His smoki edema  NEURO: Oriented times three, cranial nerves are intact, motor and sensory are grossly intact    Results for orders placed or performed during the hospital encounter of 29/91/54  -COMP METABOLIC PANEL (14)   Result Value Ref Range   Glucose 95 70-99 37.0-53.0 %   .0 150.0-450.0 10(3)uL   MCV 94.9 80.0-99.0 fL   MCH 31.1 27.0-33.2 pg   MCHC 32.8 31.0-37.0 g/dL   RDW 14.5 11.5-16.0 %   RDW-SD 50.3 (H) 35.1-46.3 fL   -TROPONIN I   Result Value Ref Range   Troponin 0.080 (HH) <0.046 ng/mL   -TROPON 12-LEAD   Result Value Ref Range   Ventricular rate 60 BPM   Atrial rate 60 BPM   P-R Interval 234 ms   QRS Duration 108 ms   Q-T Interval 384 ms   QTC Calculation (Bezet) 384 ms   P Axis 63 degrees   R Axis 34 degrees   T Axis 35 degrees   -EKG 12-LEAD ([facility] [address]    Benign essential HTN    S/P CABG (coronary artery bypass graft)    DCM (dilated cardiomyopathy) ([facility] [address]    Pure hypercholesterolemia    Multiple sclerosis ([facility]      No orders of the defined types were placed in this encounter.        Meds & Refills

## 2017-05-27 NOTE — PROCEDURES
[facility]    PATIENT'S NAME: Rudi [place]   ATTENDING PHYSICIAN: Roderick Sarabia M.D. OPERATING PHYSICIAN: Hannah Dalton.  Tyrell Godfrey MD   PATIENT ACCOUNT#:   [id number]    LOCATION:  [address] [place] [address]  MEDICAL RECORD #:   [id number]       DATE OF BIRTH:  10/1 pictures and then ended the case. Hemostasis was achieved with a Perclose device. COMPLICATIONS:  None. SEDATION:  Conscious sedation was given under my direct supervision from 11:49 until 12:25. Fentanyl and Versed were utilized.      Candance Fenton

## 2017-07-10 ENCOUNTER — HOSPITAL ENCOUNTER (OUTPATIENT)
Dept: INTERVENTIONAL RADIOLOGY/VASCULAR | Facility: HOSPITAL | Age: 67
Discharge: HOME | End: 2017-07-10
Attending: INTERNAL MEDICINE | Admitting: INTERNAL MEDICINE
Payer: MEDICARE

## 2017-07-10 VITALS
RESPIRATION RATE: 11 BRPM | TEMPERATURE: 98 F | DIASTOLIC BLOOD PRESSURE: 65 MMHG | BODY MASS INDEX: 39.24 KG/M2 | SYSTOLIC BLOOD PRESSURE: 105 MMHG | HEART RATE: 62 BPM | HEIGHT: 67 IN | OXYGEN SATURATION: 96 % | WEIGHT: 250 LBS

## 2017-07-10 DIAGNOSIS — Z45.02 ICD (IMPLANTABLE CARDIOVERTER-DEFIBRILLATOR) BATTERY DEPLETION: ICD-10-CM

## 2017-07-10 LAB
BASOPHILS # BLD AUTO: 0.03 X10(3) UL (ref 0–0.1)
BASOPHILS NFR BLD AUTO: 0.4 %
BUN BLD-MCNC: 12 MG/DL (ref 8–20)
CALCIUM BLD-MCNC: 9.3 MG/DL (ref 8.3–10.3)
CHLORIDE: 107 MMOL/L (ref 101–111)
CO2: 25 MMOL/L (ref 22–32)
CREAT BLD-MCNC: 0.99 MG/DL (ref 0.7–1.3)
EOSINOPHIL # BLD AUTO: 0.23 X10(3) UL (ref 0–0.3)
EOSINOPHIL NFR BLD AUTO: 3.3 %
ERYTHROCYTE [DISTWIDTH] IN BLOOD BY AUTOMATED COUNT: 14.2 % (ref 11.5–16)
GLUCOSE BLD-MCNC: 74 MG/DL (ref 70–99)
HCT VFR BLD AUTO: 42.5 % (ref 37–53)
HGB BLD-MCNC: 14 G/DL (ref 13–17)
IMMATURE GRANULOCYTE COUNT: 0.06 X10(3) UL (ref 0–1)
IMMATURE GRANULOCYTE RATIO %: 0.9 %
LYMPHOCYTES # BLD AUTO: 1.93 X10(3) UL (ref 0.9–4)
LYMPHOCYTES NFR BLD AUTO: 28.1 %
MCH RBC QN AUTO: 31.3 PG (ref 27–33.2)
MCHC RBC AUTO-ENTMCNC: 32.9 G/DL (ref 31–37)
MCV RBC AUTO: 94.9 FL (ref 80–99)
MONOCYTES # BLD AUTO: 0.72 X10(3) UL (ref 0.1–0.6)
MONOCYTES NFR BLD AUTO: 10.5 %
NEUTROPHIL ABS PRELIM: 3.91 X10 (3) UL (ref 1.3–6.7)
NEUTROPHILS # BLD AUTO: 3.91 X10(3) UL (ref 1.3–6.7)
NEUTROPHILS NFR BLD AUTO: 56.8 %
PLATELET # BLD AUTO: 188 10(3)UL (ref 150–450)
POTASSIUM SERPL-SCNC: 3.9 MMOL/L (ref 3.6–5.1)
RBC # BLD AUTO: 4.48 X10(6)UL (ref 3.8–5.8)
RED CELL DISTRIBUTION WIDTH-SD: 49.4 FL (ref 35.1–46.3)
SODIUM SERPL-SCNC: 139 MMOL/L (ref 136–144)
WBC # BLD AUTO: 6.9 X10(3) UL (ref 4–13)

## 2017-07-10 PROCEDURE — 80048 BASIC METABOLIC PNL TOTAL CA: CPT | Performed by: INTERNAL MEDICINE

## 2017-07-10 PROCEDURE — 0JPT0PZ REMOVAL OF CARDIAC RHYTHM RELATED DEVICE FROM TRUNK SUBCUTANEOUS TISSUE AND FASCIA, OPEN APPROACH: ICD-10-PCS | Performed by: INTERNAL MEDICINE

## 2017-07-10 PROCEDURE — 99152 MOD SED SAME PHYS/QHP 5/>YRS: CPT

## 2017-07-10 PROCEDURE — 0JH608Z INSERTION OF DEFIBRILLATOR GENERATOR INTO CHEST SUBCUTANEOUS TISSUE AND FASCIA, OPEN APPROACH: ICD-10-PCS | Performed by: INTERNAL MEDICINE

## 2017-07-10 PROCEDURE — 85025 COMPLETE CBC W/AUTO DIFF WBC: CPT | Performed by: INTERNAL MEDICINE

## 2017-07-10 PROCEDURE — 33263 RMVL & RPLCMT DFB GEN 2 LEAD: CPT

## 2017-07-10 PROCEDURE — 99153 MOD SED SAME PHYS/QHP EA: CPT

## 2017-07-10 RX ORDER — MIDAZOLAM HYDROCHLORIDE 1 MG/ML
INJECTION INTRAMUSCULAR; INTRAVENOUS
Status: COMPLETED
Start: 2017-07-10 | End: 2017-07-10

## 2017-07-10 RX ORDER — SODIUM CHLORIDE 9 MG/ML
INJECTION, SOLUTION INTRAVENOUS CONTINUOUS
Status: DISCONTINUED | OUTPATIENT
Start: 2017-07-10 | End: 2017-07-10

## 2017-07-10 RX ORDER — CHLORHEXIDINE GLUCONATE 4 G/100ML
30 SOLUTION TOPICAL
Status: DISCONTINUED | OUTPATIENT
Start: 2017-07-11 | End: 2017-07-10

## 2017-07-10 RX ORDER — LIDOCAINE HYDROCHLORIDE 10 MG/ML
INJECTION, SOLUTION INFILTRATION; PERINEURAL
Status: COMPLETED
Start: 2017-07-10 | End: 2017-07-10

## 2017-07-10 RX ORDER — ONDANSETRON 2 MG/ML
4 INJECTION INTRAMUSCULAR; INTRAVENOUS EVERY 6 HOURS PRN
Status: DISCONTINUED | OUTPATIENT
Start: 2017-07-10 | End: 2017-07-10

## 2017-07-10 RX ORDER — BACITRACIN 50000 [USP'U]/1
INJECTION, POWDER, LYOPHILIZED, FOR SOLUTION INTRAMUSCULAR
Status: COMPLETED
Start: 2017-07-10 | End: 2017-07-10

## 2017-07-10 RX ADMIN — SODIUM CHLORIDE 500 ML: 9 INJECTION, SOLUTION INTRAVENOUS at 12:45:00

## 2017-07-10 NOTE — PROGRESS NOTES
Discharge instructions expalained to the patient and family and questions were answered,incision area is clean and dry, vitals are stable. patient walked in the room, Iv removed. patient was taken in a wheelchair to the car and went home in stable condition

## 2017-07-10 NOTE — PROCEDURES
[facility]    PATIENT'S NAME: Oneyda Sanders   ATTENDING PHYSICIAN: Romayne Rabon, M.D. OPERATING PHYSICIAN: Romayne Rabon, M.D.    PATIENT ACCOUNT#:   [de-identified]    LOCATION:  [facility] 2 EDWP 10  MEDICAL RECORD #:   [id number]       DATE OF BIRTH: placed in the pocket with excess lead posterior to it. It would be tested. P waves were 1.1, pacing threshold was 0.7 V at 0.4 milliseconds, lead impedance was 382 ohms.   RV waves were 22, pacing threshold was 1 V at 0.4 milliseconds, lead impedance 495,

## 2017-07-10 NOTE — H&P
ASSESSMENT:      - VT-s S/P BS ICD 3/2012  - Amiodarone started 3/2012  - ICM  - CAD S/P CABG 1998  Stent Circ. 2005  - DON on CPAP  - M.S.  S/p bilat Hip replacement 2014  - increased BMI  - S/PRFA of VT  3/2012      PLAN:      -  reviewed and ne EPIDURAL - LUMBAR;  Surgeon: Venus Barraza MD;  Location: [facility] FOR PAIN MANAGEMENT                     PATIENT [address] FOR IV ANTIBIOTIC SURGICAL SITE INFECTION PROPHYLAXIS.   7/12/2013                     Comment Procedure: TRA Procedure: LUMBAR EPIDURAL;  Surgeon: Nehemias Sullivan MD;  Location: [facility] FOR PAIN MANAGEMENT                   PATIENT [address] FOR IV ANTIBIOTIC SURGICAL SITE INFECTION PROPHYLAXIS.   8/14/2013                   Comment Procedure Disp: 90 tablet Rfl: 1   furosemide 20 MG Oral Tab Take 2 tablets (40 mg total) by mouth once daily. Disp: 180 tablet Rfl: 3   amiodarone HCl 200 MG Oral Tab Take 0.5 tablets (100 mg total) by mouth daily.  Disp: 45 tablet Rfl: 3   Acebutolol HCl 200 MG Ora questions appropriately  Dermatologic: No rashes; normal skin turgor ICD site well healed      Neuro: alert        LABORATORY VALUES:         CHOLESTEROL, TOTAL (mg/dL)   Date Value   09/11/2015 161   04/20/2015 149   ----------      CHOLESTEROL (mg/dL)

## 2017-11-03 ENCOUNTER — APPOINTMENT (OUTPATIENT)
Dept: LAB | Age: 67
End: 2017-11-03
Attending: FAMILY MEDICINE
Payer: MEDICARE

## 2017-11-03 ENCOUNTER — OFFICE VISIT (OUTPATIENT)
Dept: FAMILY MEDICINE CLINIC | Facility: CLINIC | Age: 67
End: 2017-11-03

## 2017-11-03 VITALS
OXYGEN SATURATION: 97 % | TEMPERATURE: 97 F | RESPIRATION RATE: 16 BRPM | WEIGHT: 275 LBS | DIASTOLIC BLOOD PRESSURE: 78 MMHG | BODY MASS INDEX: 43.16 KG/M2 | SYSTOLIC BLOOD PRESSURE: 138 MMHG | HEIGHT: 67 IN | HEART RATE: 67 BPM

## 2017-11-03 DIAGNOSIS — Z00.00 MEDICARE ANNUAL WELLNESS VISIT, SUBSEQUENT: Primary | ICD-10-CM

## 2017-11-03 DIAGNOSIS — I25.10 ATHEROSCLEROSIS OF NATIVE CORONARY ARTERY OF NATIVE HEART WITHOUT ANGINA PECTORIS: ICD-10-CM

## 2017-11-03 DIAGNOSIS — L29.9 PRURITUS: ICD-10-CM

## 2017-11-03 DIAGNOSIS — R35.1 NOCTURIA: ICD-10-CM

## 2017-11-03 DIAGNOSIS — G35 MULTIPLE SCLEROSIS (HCC): ICD-10-CM

## 2017-11-03 DIAGNOSIS — R21 RASH AND NONSPECIFIC SKIN ERUPTION: ICD-10-CM

## 2017-11-03 DIAGNOSIS — E78.00 PURE HYPERCHOLESTEROLEMIA: ICD-10-CM

## 2017-11-03 DIAGNOSIS — E66.01 OBESITY, MORBID, BMI 40.0-49.9 (HCC): ICD-10-CM

## 2017-11-03 DIAGNOSIS — I48.20 CHRONIC ATRIAL FIBRILLATION (HCC): ICD-10-CM

## 2017-11-03 DIAGNOSIS — I10 ESSENTIAL HYPERTENSION: ICD-10-CM

## 2017-11-03 PROCEDURE — 36415 COLL VENOUS BLD VENIPUNCTURE: CPT

## 2017-11-03 PROCEDURE — G0444 DEPRESSION SCREEN ANNUAL: HCPCS | Performed by: FAMILY MEDICINE

## 2017-11-03 PROCEDURE — G0009 ADMIN PNEUMOCOCCAL VACCINE: HCPCS | Performed by: FAMILY MEDICINE

## 2017-11-03 PROCEDURE — 90732 PPSV23 VACC 2 YRS+ SUBQ/IM: CPT | Performed by: FAMILY MEDICINE

## 2017-11-03 PROCEDURE — 99214 OFFICE O/P EST MOD 30 MIN: CPT | Performed by: FAMILY MEDICINE

## 2017-11-03 PROCEDURE — 84153 ASSAY OF PSA TOTAL: CPT

## 2017-11-03 PROCEDURE — G0439 PPPS, SUBSEQ VISIT: HCPCS | Performed by: FAMILY MEDICINE

## 2017-11-03 RX ORDER — MOMETASONE FUROATE 1 MG/G
1 CREAM TOPICAL 2 TIMES DAILY PRN
Qty: 60 G | Refills: 1 | Status: SHIPPED | OUTPATIENT
Start: 2017-11-03 | End: 2018-03-23

## 2017-11-03 NOTE — PATIENT INSTRUCTIONS
Continue current meds. Watch diet for fats and carbs. Stay active. [facility] SCREENING SCHEDULE   Tests on this list are recommended by your physician but may not be covered, or covered at this frequency, by your insurer.  Please check with your this or any previous visit.  Limited to patients who meet one of the following criteria:   • Men who are 73-68 years old and have smoked more than 100 cigarettes in their lifetime   • Anyone with a family history    Colorectal Cancer Screening Covered up to of institutions for the mentally retarded   Persons who live in the same house as a HepB virus carrier   Homosexual men   Illicit injectable drug abusers     Tetanus Toxoid- Only covered with a cut with metal- TD and TDaP Not covered by Medicare Part B)

## 2017-11-03 NOTE — PROGRESS NOTES
HPI:   Rhona Rojas is a 69year old male who presents for a Medicare Subsequent Annual Wellness visit (Pt already had Initial Annual Wellness) discussed itchy arms, hypercholesterolemia, MS, A. fib, coronary artery disease, nocturia, obesity.     Reymundo ([facility]     Peptic ulcer, unspecified site, unspecified as acute or chronic, without mention of hemorrhage, perforation, or obstruction     Jo's esophagus     Coronary atherosclerosis     Osteoarthrosis, unspecified whether generalized or localized, pel CBC  (most recent labs)     Lab Results  Component Value Date   WBC 6.9 07/10/2017   HGB 14.0 07/10/2017   .0 07/10/2017        ALLERGIES:   He has No Known Allergies.     CURRENT MEDICATIONS:     Outpatient Prescriptions Marked as Taking for the unspecified as acute or chronic, without mention of hemorrhage, perforation, or obstruction (6/29/2012);  Personal history of methicillin resistant Staphylococcus aureus; Personal history of other diseases of circulatory system; Pre-operative cardiovascular history. He has never used smokeless tobacco. He reports that he drinks about 1.2 oz of alcohol per week . He reports that he does not use drugs.      REVIEW OF SYSTEMS:   GENERAL: feels well otherwise  SKIN: rash bilateral forearms itchy skin  EYES: denies Regular rate and rhythm, S1, S2 normal, no murmur,   Abdomen:   Soft, non-tender, bowel sounds active all four quadrants,  no masses, no organomegaly   Genitalia: Normal male prostate is slightly enlarged   Rectal: Normal tone, prostate is slightly enlarg Health Care on file in [address] [place] [address] Rd. Discussed with patient and provided information           PLAN:  Continue current meds. Watch diet for fats and carbs. Stay active. The patient indicates understanding of these issues and agrees to the plan.     No Follow- healthcare power of ?: No    Do you have a living will?: No     Please go to \"Cognitive Assessment\" under Medicare Assessment section in Charting, test patient and document. Then, refresh your progress note to see your input here.   Cognitive A 13 (Prevnar)   Orders placed or performed in visit on 05/20/15  -PNEUMOCOCCAL VACC, 13 OMERO IM         Pneumococcal 23 (Pneumovax)   Orders placed or performed in visit on 11/03/17  -PNEUMOCOCCAL IMM (PNEUMOVAX)        Hepatitis B No orders found for this o

## 2018-08-03 ENCOUNTER — TELEPHONE (OUTPATIENT)
Dept: FAMILY MEDICINE CLINIC | Facility: CLINIC | Age: 68
End: 2018-08-03

## 2018-09-05 ENCOUNTER — HOSPITAL ENCOUNTER (OUTPATIENT)
Dept: LAB | Facility: HOSPITAL | Age: 68
Discharge: HOME | End: 2018-09-05
Attending: INTERNAL MEDICINE
Payer: MEDICARE

## 2018-09-05 DIAGNOSIS — I47.2 VT (VENTRICULAR TACHYCARDIA) (HCC): ICD-10-CM

## 2018-09-05 DIAGNOSIS — I25.10 ATHEROSCLEROSIS OF NATIVE CORONARY ARTERY OF NATIVE HEART WITHOUT ANGINA PECTORIS: ICD-10-CM

## 2018-09-05 DIAGNOSIS — I42.0 DCM (DILATED CARDIOMYOPATHY) (HCC): ICD-10-CM

## 2018-09-05 DIAGNOSIS — Z95.810 CARDIAC DEFIBRILLATOR IN PLACE: ICD-10-CM

## 2018-09-05 DIAGNOSIS — Z79.899 ON AMIODARONE THERAPY: ICD-10-CM

## 2018-09-05 DIAGNOSIS — E78.00 PURE HYPERCHOLESTEROLEMIA: ICD-10-CM

## 2018-09-05 DIAGNOSIS — R63.8 INCREASED BMI: ICD-10-CM

## 2018-09-05 DIAGNOSIS — R60.0 LOWER EXTREMITY EDEMA: ICD-10-CM

## 2018-09-05 LAB
ALBUMIN SERPL-MCNC: 3.9 G/DL (ref 3.5–4.8)
ALBUMIN/GLOB SERPL: 1.1 {RATIO} (ref 1–2)
ALP LIVER SERPL-CCNC: 94 U/L (ref 45–117)
ALT SERPL-CCNC: 69 U/L (ref 17–63)
ANION GAP SERPL CALC-SCNC: 9 MMOL/L (ref 0–18)
AST SERPL-CCNC: 57 U/L (ref 15–41)
BILIRUB SERPL-MCNC: 0.9 MG/DL (ref 0.1–2)
BUN BLD-MCNC: 13 MG/DL (ref 8–20)
BUN/CREAT SERPL: 10.3 (ref 10–20)
CALCIUM BLD-MCNC: 9.1 MG/DL (ref 8.3–10.3)
CHLORIDE SERPL-SCNC: 105 MMOL/L (ref 101–111)
CHOLEST SMN-MCNC: 129 MG/DL (ref ?–200)
CO2 SERPL-SCNC: 27 MMOL/L (ref 22–32)
CREAT BLD-MCNC: 1.26 MG/DL (ref 0.7–1.3)
GLOBULIN PLAS-MCNC: 3.4 G/DL (ref 2.5–4)
GLUCOSE BLD-MCNC: 81 MG/DL (ref 70–99)
HDLC SERPL-MCNC: 61 MG/DL (ref 40–59)
LDLC SERPL CALC-MCNC: 51 MG/DL (ref ?–100)
M PROTEIN MFR SERPL ELPH: 7.3 G/DL (ref 6.1–8.3)
NONHDLC SERPL-MCNC: 68 MG/DL (ref ?–130)
OSMOLALITY SERPL CALC.SUM OF ELEC: 291 MOSM/KG (ref 275–295)
POTASSIUM SERPL-SCNC: 4.1 MMOL/L (ref 3.6–5.1)
SODIUM SERPL-SCNC: 141 MMOL/L (ref 136–144)
TRIGL SERPL-MCNC: 85 MG/DL (ref 30–149)
TSI SER-ACNC: 3.82 MIU/ML (ref 0.35–5.5)
VLDLC SERPL CALC-MCNC: 17 MG/DL (ref 0–30)

## 2018-09-05 PROCEDURE — 84443 ASSAY THYROID STIM HORMONE: CPT

## 2018-09-05 PROCEDURE — 80053 COMPREHEN METABOLIC PANEL: CPT

## 2018-09-05 PROCEDURE — 36415 COLL VENOUS BLD VENIPUNCTURE: CPT

## 2018-09-05 PROCEDURE — 80061 LIPID PANEL: CPT

## 2018-10-04 ENCOUNTER — APPOINTMENT (OUTPATIENT)
Dept: CV DIAGNOSTICS | Facility: HOSPITAL | Age: 68
DRG: 247 | End: 2018-10-04
Attending: HOSPITALIST
Payer: MEDICARE

## 2018-10-04 ENCOUNTER — APPOINTMENT (OUTPATIENT)
Dept: INTERVENTIONAL RADIOLOGY/VASCULAR | Facility: HOSPITAL | Age: 68
DRG: 247 | End: 2018-10-04
Attending: INTERNAL MEDICINE
Payer: MEDICARE

## 2018-10-04 ENCOUNTER — APPOINTMENT (OUTPATIENT)
Dept: CT IMAGING | Facility: HOSPITAL | Age: 68
DRG: 247 | End: 2018-10-04
Attending: EMERGENCY MEDICINE
Payer: MEDICARE

## 2018-10-04 ENCOUNTER — HOSPITAL ENCOUNTER (INPATIENT)
Facility: HOSPITAL | Age: 68
LOS: 2 days | Discharge: HOME | DRG: 247 | End: 2018-10-06
Attending: EMERGENCY MEDICINE | Admitting: HOSPITALIST
Payer: MEDICARE

## 2018-10-04 ENCOUNTER — APPOINTMENT (OUTPATIENT)
Dept: GENERAL RADIOLOGY | Facility: HOSPITAL | Age: 68
DRG: 247 | End: 2018-10-04
Attending: EMERGENCY MEDICINE
Payer: MEDICARE

## 2018-10-04 DIAGNOSIS — R77.8 ELEVATED TROPONIN: ICD-10-CM

## 2018-10-04 DIAGNOSIS — R07.89 CHEST PAIN, ATYPICAL: Primary | ICD-10-CM

## 2018-10-04 PROCEDURE — B2121ZZ FLUOROSCOPY OF SINGLE CORONARY ARTERY BYPASS GRAFT USING LOW OSMOLAR CONTRAST: ICD-10-PCS | Performed by: RADIOLOGY

## 2018-10-04 PROCEDURE — 4A023N7 MEASUREMENT OF CARDIAC SAMPLING AND PRESSURE, LEFT HEART, PERCUTANEOUS APPROACH: ICD-10-PCS | Performed by: RADIOLOGY

## 2018-10-04 PROCEDURE — B41F1ZZ FLUOROSCOPY OF RIGHT LOWER EXTREMITY ARTERIES USING LOW OSMOLAR CONTRAST: ICD-10-PCS | Performed by: RADIOLOGY

## 2018-10-04 PROCEDURE — 93306 TTE W/DOPPLER COMPLETE: CPT | Performed by: HOSPITALIST

## 2018-10-04 PROCEDURE — B2101ZZ FLUOROSCOPY OF SINGLE CORONARY ARTERY USING LOW OSMOLAR CONTRAST: ICD-10-PCS | Performed by: RADIOLOGY

## 2018-10-04 PROCEDURE — B2181ZZ FLUOROSCOPY OF LEFT INTERNAL MAMMARY BYPASS GRAFT USING LOW OSMOLAR CONTRAST: ICD-10-PCS | Performed by: RADIOLOGY

## 2018-10-04 PROCEDURE — 71045 X-RAY EXAM CHEST 1 VIEW: CPT | Performed by: EMERGENCY MEDICINE

## 2018-10-04 PROCEDURE — 99223 1ST HOSP IP/OBS HIGH 75: CPT | Performed by: HOSPITALIST

## 2018-10-04 PROCEDURE — 71275 CT ANGIOGRAPHY CHEST: CPT | Performed by: EMERGENCY MEDICINE

## 2018-10-04 PROCEDURE — 027034Z DILATION OF CORONARY ARTERY, ONE ARTERY WITH DRUG-ELUTING INTRALUMINAL DEVICE, PERCUTANEOUS APPROACH: ICD-10-PCS | Performed by: RADIOLOGY

## 2018-10-04 PROCEDURE — B2151ZZ FLUOROSCOPY OF LEFT HEART USING LOW OSMOLAR CONTRAST: ICD-10-PCS | Performed by: RADIOLOGY

## 2018-10-04 RX ORDER — POLYETHYLENE GLYCOL 3350 17 G/17G
17 POWDER, FOR SOLUTION ORAL DAILY PRN
Status: DISCONTINUED | OUTPATIENT
Start: 2018-10-04 | End: 2018-10-06

## 2018-10-04 RX ORDER — ONDANSETRON 2 MG/ML
4 INJECTION INTRAMUSCULAR; INTRAVENOUS EVERY 4 HOURS PRN
Status: DISCONTINUED | OUTPATIENT
Start: 2018-10-04 | End: 2018-10-06

## 2018-10-04 RX ORDER — HEPARIN SODIUM AND DEXTROSE 10000; 5 [USP'U]/100ML; G/100ML
1000 INJECTION INTRAVENOUS ONCE
Status: COMPLETED | OUTPATIENT
Start: 2018-10-04 | End: 2018-10-04

## 2018-10-04 RX ORDER — HEPARIN SODIUM 5000 [USP'U]/ML
5000 INJECTION INTRAVENOUS; SUBCUTANEOUS ONCE
Status: COMPLETED | OUTPATIENT
Start: 2018-10-04 | End: 2018-10-04

## 2018-10-04 RX ORDER — HEPARIN SODIUM 5000 [USP'U]/ML
5000 INJECTION, SOLUTION INTRAVENOUS; SUBCUTANEOUS EVERY 8 HOURS SCHEDULED
Status: DISCONTINUED | OUTPATIENT
Start: 2018-10-04 | End: 2018-10-04

## 2018-10-04 RX ORDER — CLOPIDOGREL BISULFATE 75 MG/1
75 TABLET ORAL DAILY
Status: DISCONTINUED | OUTPATIENT
Start: 2018-10-04 | End: 2018-10-04

## 2018-10-04 RX ORDER — AMIODARONE HYDROCHLORIDE 200 MG/1
200 TABLET ORAL DAILY
Status: DISCONTINUED | OUTPATIENT
Start: 2018-10-04 | End: 2018-10-06

## 2018-10-04 RX ORDER — SODIUM CHLORIDE 9 MG/ML
INJECTION, SOLUTION INTRAVENOUS CONTINUOUS
Status: ACTIVE | OUTPATIENT
Start: 2018-10-04 | End: 2018-10-04

## 2018-10-04 RX ORDER — HEPARIN SODIUM AND DEXTROSE 10000; 5 [USP'U]/100ML; G/100ML
INJECTION INTRAVENOUS CONTINUOUS
Status: DISCONTINUED | OUTPATIENT
Start: 2018-10-04 | End: 2018-10-06

## 2018-10-04 RX ORDER — NICARDIPINE HYDROCHLORIDE 2.5 MG/ML
INJECTION INTRAVENOUS
Status: COMPLETED
Start: 2018-10-04 | End: 2018-10-04

## 2018-10-04 RX ORDER — ASPIRIN 81 MG/1
324 TABLET, CHEWABLE ORAL ONCE
Status: DISCONTINUED | OUTPATIENT
Start: 2018-10-04 | End: 2018-10-06

## 2018-10-04 RX ORDER — MORPHINE SULFATE 4 MG/ML
2 INJECTION, SOLUTION INTRAMUSCULAR; INTRAVENOUS EVERY 2 HOUR PRN
Status: DISCONTINUED | OUTPATIENT
Start: 2018-10-04 | End: 2018-10-06

## 2018-10-04 RX ORDER — MORPHINE SULFATE 4 MG/ML
1 INJECTION, SOLUTION INTRAMUSCULAR; INTRAVENOUS EVERY 2 HOUR PRN
Status: DISCONTINUED | OUTPATIENT
Start: 2018-10-04 | End: 2018-10-06

## 2018-10-04 RX ORDER — SODIUM CHLORIDE 9 MG/ML
INJECTION, SOLUTION INTRAVENOUS CONTINUOUS
Status: DISCONTINUED | OUTPATIENT
Start: 2018-10-04 | End: 2018-10-04

## 2018-10-04 RX ORDER — HEPARIN SODIUM 5000 [USP'U]/ML
INJECTION, SOLUTION INTRAVENOUS; SUBCUTANEOUS
Status: COMPLETED
Start: 2018-10-04 | End: 2018-10-04

## 2018-10-04 RX ORDER — NITROGLYCERIN 20 MG/100ML
5 INJECTION INTRAVENOUS CONTINUOUS
Status: DISCONTINUED | OUTPATIENT
Start: 2018-10-04 | End: 2018-10-06

## 2018-10-04 RX ORDER — METOCLOPRAMIDE HYDROCHLORIDE 5 MG/ML
10 INJECTION INTRAMUSCULAR; INTRAVENOUS EVERY 8 HOURS PRN
Status: DISCONTINUED | OUTPATIENT
Start: 2018-10-04 | End: 2018-10-06

## 2018-10-04 RX ORDER — LOSARTAN POTASSIUM 25 MG/1
25 TABLET ORAL
Status: DISCONTINUED | OUTPATIENT
Start: 2018-10-04 | End: 2018-10-06

## 2018-10-04 RX ORDER — MORPHINE SULFATE 4 MG/ML
4 INJECTION, SOLUTION INTRAMUSCULAR; INTRAVENOUS EVERY 2 HOUR PRN
Status: DISCONTINUED | OUTPATIENT
Start: 2018-10-04 | End: 2018-10-06

## 2018-10-04 RX ORDER — CLOPIDOGREL BISULFATE 75 MG/1
75 TABLET ORAL DAILY
Status: DISCONTINUED | OUTPATIENT
Start: 2018-10-05 | End: 2018-10-06

## 2018-10-04 RX ORDER — LIDOCAINE HYDROCHLORIDE 10 MG/ML
INJECTION, SOLUTION EPIDURAL; INFILTRATION; INTRACAUDAL; PERINEURAL
Status: COMPLETED
Start: 2018-10-04 | End: 2018-10-04

## 2018-10-04 RX ORDER — ASPIRIN 325 MG
325 TABLET ORAL DAILY
Status: DISCONTINUED | OUTPATIENT
Start: 2018-10-04 | End: 2018-10-06

## 2018-10-04 RX ORDER — NITROGLYCERIN 20 MG/100ML
INJECTION INTRAVENOUS
Status: COMPLETED
Start: 2018-10-04 | End: 2018-10-04

## 2018-10-04 RX ORDER — MIDAZOLAM HYDROCHLORIDE 1 MG/ML
INJECTION INTRAMUSCULAR; INTRAVENOUS
Status: COMPLETED
Start: 2018-10-04 | End: 2018-10-04

## 2018-10-04 RX ORDER — CLOPIDOGREL BISULFATE 75 MG/1
TABLET ORAL
Status: COMPLETED
Start: 2018-10-04 | End: 2018-10-04

## 2018-10-04 RX ORDER — BISACODYL 10 MG
10 SUPPOSITORY, RECTAL RECTAL
Status: DISCONTINUED | OUTPATIENT
Start: 2018-10-04 | End: 2018-10-06

## 2018-10-04 RX ORDER — ATORVASTATIN CALCIUM 40 MG/1
40 TABLET, FILM COATED ORAL DAILY
Status: DISCONTINUED | OUTPATIENT
Start: 2018-10-04 | End: 2018-10-06

## 2018-10-04 NOTE — PROCEDURES
[address] Location: Cath Lab    CSN [id number] MRN [id number]   Admission Date 10/4/2018 Procedure Date 10/4/2018   Attending Physician Pedro Acosta MD Procedure Physician Salina Miller MD         CARDIAC CATHETERIZATION/PERCUT catheterization:    Left ventricle: 112/21 mmHg  Aorta: 109/60/83 mmHg  Left ventriculogram demonstrated a LV ejection fraction of 35% with 1+ mitral regurgitation; basal inferior akinesis and severe anterolateral/apical hypokinesis  There was no aortic st was treated with repeated IC nicardipine. Angiography demonstrated improved, but residual slow flow in the very distal aspect of the vessel, which is small caliber; thus, further intervention was deferred.   Completion angiography showed a good angiographi

## 2018-10-04 NOTE — H&P
[facility] HOSPITALIST  History and Physical     Garcia Vee Patient Status:  Emergency    10/16/1950 MRN [id number]   Location [facility] [facility] Attending Emilee Spence MD   Hosp Day # 0 PCP Hollie Wood MD     Chief Complai response               inhibit therapy  1/2016: COLONOSCOPY      Comment:  Dr Nikolai Cummins due in 5 years  7/31/2013: Rula Roberto NDL/CATH SPI DX/THER XIY      Comment:  Procedure: TRANSFORAMINAL EPIDURAL - LUMBAR;  Surgeon:                NASEEM Angulo MANAGEMENT  7/31/2013: PATIENT DOCUMENTED NOT TO HAVE EXPERIENCED ANY OF THE   FOLLOWING EVENTS      Comment:  Procedure: TRANSFORAMINAL EPIDURAL - LUMBAR;  Surgeon:                Mireya Page MD;  Location: [address] [place] [address] Heart Disease Father             • Heart Disorder Father    • Heart Disorder Paternal Grandmother        Allergies: No Known Allergies    Medications:    No current facility-administered medications on file prior to encounter.    Current Outpatient mucous membranes. EOM-I. PERRLA. Anicteric. Neck: obese  Respiratory: Clear to auscultation bilaterally. No wheezes. No rhonchi. Cardiovascular: S1, S2. Regular rate and rhythm. Chest and Back: Tender sternum   Abdomen: Soft, nontender, nondistended.   P

## 2018-10-04 NOTE — ED PROVIDER NOTES
Patient Seen in: [facility] Emergency Department    History   Patient presents with:  Chest Pain Angina (cardiovascular)    Stated Complaint: chest pain    HPI    Patient is a 24-year-old male with extensive history of coronary artery disease has ha hypercholesterolemia 6/29/2012   • Unspecified sleep apnea    • Unspecified urinary incontinence    • Ventricular arrhythmia        Past Surgical History:  1991: APPENDECTOMY  1998: CABG  No date: CARDIAC DEFIBRILLATOR PLACEMENT      Comment:  agapito ortega OTHER SURGICAL HISTORY      Comment:  endoscopy due in 3 years Dr Marilin Portillo  7/12/2013: PATIENT DOCUMENTED NOT TO HAVE EXPERIENCED ANY OF THE   FOLLOWING EVENTS      Comment:  Procedure: TRANSFORAMINAL EPIDURAL - LUMBAR;  Surgeon:                Alexia Mcmahan Packs/day: 3.00        Years: 12.00        Pack years: 39        Types: Cigarettes        Quit date: 8/10/1977        Years since quittin.1      Smokeless tobacco: Never Used    Alcohol use:  Yes      Alcohol/week: 1.2 oz      Types: 2 Standard drinks person. Motor strength is 5 over 5 in all 4 extremities. There are no gross motor or sensory deficits appreciated. Cranial nerves II through XII are intact. Patient answering all questions appropriately.              ED Course     Labs Reviewed   COMP META secondary to patient's atypical nature of his pain shows no evidence of any pulmonary embolism or aortic abnormalities as per radiologist.      MDM   5:24  PT WITH NO OTHER NEW COMPLAINTS AT [facility].   PT STILL WITH SOME MILD PAIN, ALBEIT IMPROVED AT THIS am    Follow-up:  No follow-up provider specified.       Medications Prescribed:  Current Discharge Medication List        Present on Admission  Date Reviewed: 11/3/2017          ICD-10-CM Noted POA    Chest pain, atypical R07.89 10/4/2018 Unknown

## 2018-10-04 NOTE — PLAN OF CARE
Assumed care of pt at 1630. Neuro's at baseline. Chest pain controlled with nitro gtt, chest pain at a 4/10 at its worst. Palpable pulses in lower extremities, 2+ pitting edema in lower extremities. Heparin gtt, at 1000 units.  Updated on plan of care, all

## 2018-10-04 NOTE — RESPIRATORY THERAPY NOTE
DON - Equipment Use Daily Summary:                  . Set Mode:                . Usage in hours:                . 90% Pressure (EPAP) level:                . 90% Insp. Pressure (IPAP): Armen Tan AHI:                .  Supplemental Oxygen:    LPM

## 2018-10-04 NOTE — DIETARY NOTE
Nutrition Short Note   Dietitian consult received per cardiac rehab standing order. Pt to be educated by cardiac rehab staff and encouraged to attend outpatient classes taught by RD. RD available PRN.      Gill Epperson RD, LDN  Pager [phone]

## 2018-10-05 PROBLEM — G47.33 OSA ON CPAP: Status: ACTIVE | Noted: 2018-10-05

## 2018-10-05 PROBLEM — Z99.89 OSA ON CPAP: Status: ACTIVE | Noted: 2018-10-05

## 2018-10-05 PROCEDURE — 99233 SBSQ HOSP IP/OBS HIGH 50: CPT | Performed by: HOSPITALIST

## 2018-10-05 NOTE — PROGRESS NOTES
[facility] HOSPITALIST  Progress Note     Fausto Fontana Patient Status:  Inpatient    10/16/1950 MRN [id number]   [facility] 8NE-A Attending Morris Pedraza MD   Hosp Day # 1 PCP Tommy Galvan MD     Chief Complaint: Chest pain    S: MVINJX Oral Daily   • metoprolol Tartrate  25 mg Oral BID   • Clopidogrel Bisulfate  75 mg Oral Daily       ASSESSMENT / PLAN:     1. NSTEMI, s/p LHC  1. Cont DAPT  2. Cardiology following  2. CAD, as above  3. DL, statin  4. DON, on Cpap  5. HTN, controlled  6.

## 2018-10-05 NOTE — RESPIRATORY THERAPY NOTE
DON : EQUIPMENT USE: DAILY SUMMARY                                            SET MODE: CPAP WITH CFLEX                                          USAGE IN HOURS: 4.7                                          90% EXP.

## 2018-10-05 NOTE — CARDIAC REHAB
MI and CAD education completed.  Pt did not make appt at this time will talk to Dr. Mary Flores later

## 2018-10-05 NOTE — PROGRESS NOTES
[facility] Cardiology/[facility]    Progress Note    Surinder Smith  69year old  [id number]  MD Ze Lucia MD    Assessment:  1. NSTEMI  S/P VERONIKA to OM1 10/4/18  2. CAD s/p CABG 1998  3. Cardiomyopathy  4. Dyslipdemia  5.  HTN        Holli PT 36.3 (H) 10/02/2012    PT 32.8 (H) 10/01/2012    PT 27.3 (H) 09/30/2012    INR 0.96 10/04/2018    INR 1.15 (H) 05/19/2017    INR 0.99 05/19/2017       Medications:    Current Facility-Administered Medications:   •  aspirin chewable tab 324 mg, 324 mg, O

## 2018-10-05 NOTE — CONSULTS
[facility]  Report of Consultation    Luis Antonio Che Patient Status:  Inpatient    10/16/1950 MRN [id number]   [facility] 6NE-A Attending Aquilino Stroud MD   Hosp Day # 1 PCP Ankur López MD     [facility])     Ventricular tachyarrhythmia [facility])     Cardiac defibrillator in place     Acute chest pain     Coronary artery disease involving native heart with unstable angina pectoris (HCC)     S/P CABG (coronary artery bypass graft)     Troponin I above refe TARTRATE 25 MG Oral Tab TAKE 1 TABLET BY MOUTH TWICE DAILY Disp: 180 tablet Rfl: 3   Clopidogrel Bisulfate 75 MG Oral Tab Take 1 tablet (75 mg total) by mouth daily.  Disp: 90 tablet Rfl: 3   Ocrelizumab (OCREVUS) 300 MG/10ML Intravenous Solution Inject 20 demonstrate any evidence of peripheral edema. No cyanosis or clubbing of the digits is appreciated. Femoral, Dorsalis Pedis, and Posterior Tibialis  pulses are 2+ and equal in a symmetric fashion. Neurologic: Alert and oriented, normal affect.   No ian

## 2018-10-06 VITALS
DIASTOLIC BLOOD PRESSURE: 75 MMHG | RESPIRATION RATE: 18 BRPM | TEMPERATURE: 98 F | SYSTOLIC BLOOD PRESSURE: 130 MMHG | BODY MASS INDEX: 44 KG/M2 | HEART RATE: 62 BPM | WEIGHT: 283.5 LBS | OXYGEN SATURATION: 96 %

## 2018-10-06 PROCEDURE — 99239 HOSP IP/OBS DSCHRG MGMT >30: CPT | Performed by: HOSPITALIST

## 2018-10-06 NOTE — PROGRESS NOTES
[facility] Cardiology Progress Note      Heather Olson Patient Status:  Inpatient    10/16/1950 MRN [id number]   [facility] 8NE-A Attending Debbie Mancilla MD   Hosp Day # 2 PCP Toy Canales MD     Subjective:    Feels fine. 10/05/18   0422   NA  141  139   K  3.9  3.9   CL  108  106   CO2  24.0  25.0   BUN  14  10   CREATSERUM  1.12  0.95   GLU  113*  91       Recent Labs   Lab  10/04/18   0154   INR  0.96       Recent Labs   Lab  10/04/18   0154  10/04/18   0434   TROP  0.21

## 2018-10-06 NOTE — RESPIRATORY THERAPY NOTE
DON - Equipment Use Daily Summary:  · Set Mode CPAP  · Usage in hours: 9:40  · 90% Pressure (EPAP) level: 14.0  · 90% Insp Pressure (IPAP):   · AHI: 6.0  · Supplemental Oxygen:   · Comments:

## 2018-10-06 NOTE — PROGRESS NOTES
IM quick note:    Pt seen and examined, feels good, has no complaints, cleared for d/c per cardiology, ok to d/c home.     /87 (BP Location: Right arm)   Pulse 67   Temp 97.8 °F (36.6 °C) (Oral)   Resp 18   Wt 283 lb 8.2 oz (128.6 kg)   SpO2 97%   BMI

## 2018-10-07 NOTE — DISCHARGE SUMMARY
[facility] [place] HOSPITALIST  DISCHARGE SUMMARY     Pipe Amador Patient Status:  Inpatient    10/16/1950 MRN [id number]   [facility] 8NE-A Attending No att. providers found   Hosp Day # 2 PCP Kaley Raymundo MD     Date of Admission: 10/4/2018 furosemide 20 MG Tabs  Commonly known as:  LASIX  What changed:  Another medication with the same name was removed. Continue taking this medication, and follow the directions you see here. TAKE 2 TABLETS (40 MG TOTAL) BY MOUTH ONCE DAILY.    Quantity Abdomen: Soft, nontender, nondistended. Positive bowel sounds. No rebound or guarding. Neurologic: No focal neurological deficits. Musculoskeletal: Moves all extremities. Extremities: No edema.   ------------------------------------------------------

## 2018-10-08 ENCOUNTER — PATIENT OUTREACH (OUTPATIENT)
Dept: CASE MANAGEMENT | Age: 68
End: 2018-10-08

## 2018-10-08 DIAGNOSIS — Z02.9 ENCOUNTERS FOR ADMINISTRATIVE PURPOSE: ICD-10-CM

## 2018-10-08 PROCEDURE — 1111F DSCHRG MED/CURRENT MED MERGE: CPT

## 2018-10-08 NOTE — PROGRESS NOTES
Initial Post Discharge Follow Up   Discharge Date: 10/6/18  Contact Date: 10/8/2018    Consent Verification:  Assessment Completed With: Patient  HIPAA Verified?   Yes    Discharge Dx:    Chest pain, NSTEMI; S/P PCI to OM1    General:   • How have you be Carbonate (CALCIUM 600 OR) Take 1 Tab by mouth daily. Disp:  Rfl:    Multiple Vitamin (MULTI VITAMIN MENS OR) Take 1 Tab by mouth daily. Disp:  Rfl:    Ascorbic Acid (VITAMIN C CR) 1000 MG Oral Tab CR Take 1 tablet by mouth daily.    Disp:  Rfl:    [facility] CARDIOLOGY  [place] at [address] [phone] Critical access hospital at [address]  [address] [place] [id number] [phone]          PCP TCM/HFU appointment: s

## 2018-10-12 ENCOUNTER — OFFICE VISIT (OUTPATIENT)
Dept: FAMILY MEDICINE CLINIC | Facility: CLINIC | Age: 68
End: 2018-10-12
Payer: MEDICARE

## 2018-10-12 VITALS
TEMPERATURE: 97 F | SYSTOLIC BLOOD PRESSURE: 132 MMHG | WEIGHT: 280 LBS | HEART RATE: 78 BPM | RESPIRATION RATE: 18 BRPM | BODY MASS INDEX: 43.95 KG/M2 | OXYGEN SATURATION: 97 % | DIASTOLIC BLOOD PRESSURE: 78 MMHG | HEIGHT: 67 IN

## 2018-10-12 DIAGNOSIS — I10 ESSENTIAL HYPERTENSION: ICD-10-CM

## 2018-10-12 DIAGNOSIS — G47.33 OSA ON CPAP: ICD-10-CM

## 2018-10-12 DIAGNOSIS — Z95.810 AUTOMATIC IMPLANTABLE CARDIOVERTER-DEFIBRILLATOR IN SITU: ICD-10-CM

## 2018-10-12 DIAGNOSIS — I25.10 ATHEROSCLEROSIS OF NATIVE CORONARY ARTERY OF NATIVE HEART WITHOUT ANGINA PECTORIS: ICD-10-CM

## 2018-10-12 DIAGNOSIS — I42.0 DCM (DILATED CARDIOMYOPATHY) (HCC): ICD-10-CM

## 2018-10-12 DIAGNOSIS — E78.00 PURE HYPERCHOLESTEROLEMIA: ICD-10-CM

## 2018-10-12 DIAGNOSIS — T14.8XXA BRUISE: ICD-10-CM

## 2018-10-12 DIAGNOSIS — I50.22 SYSTOLIC CHF, CHRONIC (HCC): ICD-10-CM

## 2018-10-12 DIAGNOSIS — G35 MULTIPLE SCLEROSIS (HCC): ICD-10-CM

## 2018-10-12 DIAGNOSIS — R53.1 GENERALIZED WEAKNESS: ICD-10-CM

## 2018-10-12 DIAGNOSIS — Z99.89 OSA ON CPAP: ICD-10-CM

## 2018-10-12 DIAGNOSIS — K80.20 GALLBLADDER STONE WITHOUT CHOLECYSTITIS OR OBSTRUCTION: ICD-10-CM

## 2018-10-12 DIAGNOSIS — R59.0 MEDIASTINAL LYMPHADENOPATHY: ICD-10-CM

## 2018-10-12 DIAGNOSIS — I21.4 NSTEMI (NON-ST ELEVATED MYOCARDIAL INFARCTION) (HCC): Primary | ICD-10-CM

## 2018-10-12 DIAGNOSIS — I48.20 CHRONIC ATRIAL FIBRILLATION (HCC): ICD-10-CM

## 2018-10-12 DIAGNOSIS — K22.70 BARRETT'S ESOPHAGUS WITHOUT DYSPLASIA: ICD-10-CM

## 2018-10-12 PROCEDURE — 99496 TRANSJ CARE MGMT HIGH F2F 7D: CPT | Performed by: FAMILY MEDICINE

## 2018-10-12 NOTE — PROGRESS NOTES
HPI:    Tae Zhou is a 69year old male here today for hospital follow up.    He was discharged from Inpatient hospital, [facility] to Home   Admission Date: 10/4/18   Discharge Date: 10/6/18  Hospital Discharge Diagnoses (since 9/12/2018)     se care unit. He underwent cardiac angiogram with drug-eluting stent placement to OM1 on 10/4. He tolerated the procedure well. He was cleared for discharge the following day and was discharged home in stable condition.   He still has a bruising in his righ (600 mg total) into the vein every 6 (six) months. aspirin 325 MG Oral Tab Take 1 tablet (325 mg total) by mouth daily. Calcium Carbonate (CALCIUM 600 OR) Take 1 Tab by mouth daily. Multiple Vitamin (MULTI VITAMIN MENS OR) Take 1 Tab by mouth daily. includes Arthritis in his mother; Heart Disease in his father; Heart Disorder in his father and paternal grandmother. He  reports that he quit smoking about 41 years ago. His smoking use included cigarettes. He has a 36.00 pack-year smoking history.  he h clear  NECK: supple, no adenopathy,   CHEST: no chest tenderness  LUNGS: clear to auscultation  CARDIO: RRR without murmur normal S1-S2,   GI: good BS's, no masses, HSM or tenderness   bruising in the right groin area extending to the scrotum and penis. Ratio 10.5 10.0 - 20.0    Calcium, Total 9.0 8.3 - 10.3 mg/dL    Calculated Osmolality 287 275 - 295 mOsm/kg    GFR, Non- 81 >=60    GFR, -American 94 >=60   CBC, PLATELET; NO DIFFERENTIAL   Result Value Ref Range    WBC 9.7 4.0 - 13 MCHC 33.6 31.0 - 37.0 g/dL    RDW 13.6 11.5 - 16.0 %    RDW-SD 48.3 (H) 35.1 - 46.3 fL    Neutrophil Absolute Prelim 5.37 1.30 - 6.70 x10 (3) uL    Neutrophil Absolute 5.37 1.30 - 6.70 x10(3) uL    Lymphocyte Absolute 1.85 0.90 - 4.00 x10(3) uL    Monocyte mildly enlarged. There is also a left cardiophrenic angle node   measuring up to 1.4 x 2.0 cm which also appears more prominent than on the prior exam where it was previously measured at 1.0 x 1.5 cm. FRANDY:  No mass or adenopathy.     CARDIAC:  Heart size cardiac problems. Management by cardiologist.  Patient is asymptomatic today.      Dilated cardiomyopathy has cardioverter defibrillator implanted per cardiologist he will call for appointment    AICD stable per cardiologist    Hypercholesterolemia on medi encounter     Continue current meds. Watch diet for fats and carbs. Stay active. Schedule appointment in January we will repeat the CT scan of the lungs at that time to look for the mediastinal lymph nodes. Continue using CPAP regularly.   Imaging &

## 2018-10-12 NOTE — PATIENT INSTRUCTIONS
Continue current meds. Watch diet for fats and carbs. Stay active. Schedule appointment in January we will repeat the CT scan of the lungs at that time to look for the mediastinal lymph nodes. Continue using CPAP regularly.

## 2019-04-10 ENCOUNTER — OFFICE VISIT (OUTPATIENT)
Dept: FAMILY MEDICINE CLINIC | Facility: CLINIC | Age: 69
End: 2019-04-10
Payer: MEDICARE

## 2019-04-10 VITALS
DIASTOLIC BLOOD PRESSURE: 62 MMHG | WEIGHT: 271 LBS | HEART RATE: 86 BPM | RESPIRATION RATE: 16 BRPM | HEIGHT: 67 IN | SYSTOLIC BLOOD PRESSURE: 100 MMHG | BODY MASS INDEX: 42.53 KG/M2 | TEMPERATURE: 98 F

## 2019-04-10 DIAGNOSIS — I10 ESSENTIAL HYPERTENSION: ICD-10-CM

## 2019-04-10 DIAGNOSIS — Z95.1 S/P CABG (CORONARY ARTERY BYPASS GRAFT): ICD-10-CM

## 2019-04-10 DIAGNOSIS — E78.00 PURE HYPERCHOLESTEROLEMIA: ICD-10-CM

## 2019-04-10 DIAGNOSIS — I50.22 SYSTOLIC CHF, CHRONIC (HCC): ICD-10-CM

## 2019-04-10 DIAGNOSIS — Z12.5 SCREENING FOR PROSTATE CANCER: ICD-10-CM

## 2019-04-10 DIAGNOSIS — I48.20 CHRONIC ATRIAL FIBRILLATION (HCC): ICD-10-CM

## 2019-04-10 DIAGNOSIS — G35 MULTIPLE SCLEROSIS (HCC): ICD-10-CM

## 2019-04-10 DIAGNOSIS — I47.2 VENTRICULAR TACHYARRHYTHMIA (HCC): ICD-10-CM

## 2019-04-10 DIAGNOSIS — Z99.89 OSA ON CPAP: ICD-10-CM

## 2019-04-10 DIAGNOSIS — I25.110 CORONARY ARTERY DISEASE INVOLVING NATIVE CORONARY ARTERY OF NATIVE HEART WITH UNSTABLE ANGINA PECTORIS (HCC): ICD-10-CM

## 2019-04-10 DIAGNOSIS — G47.33 OSA ON CPAP: ICD-10-CM

## 2019-04-10 DIAGNOSIS — R09.89 PULMONARY VASCULAR CONGESTION: ICD-10-CM

## 2019-04-10 DIAGNOSIS — Z00.00 ENCOUNTER FOR ANNUAL HEALTH EXAMINATION: Primary | ICD-10-CM

## 2019-04-10 DIAGNOSIS — E66.01 OBESITY, MORBID, BMI 40.0-49.9 (HCC): ICD-10-CM

## 2019-04-10 PROCEDURE — G0444 DEPRESSION SCREEN ANNUAL: HCPCS | Performed by: FAMILY MEDICINE

## 2019-04-10 PROCEDURE — 99214 OFFICE O/P EST MOD 30 MIN: CPT | Performed by: FAMILY MEDICINE

## 2019-04-10 PROCEDURE — G0439 PPPS, SUBSEQ VISIT: HCPCS | Performed by: FAMILY MEDICINE

## 2019-04-10 NOTE — PROGRESS NOTES
HPI:   Pipe Amador is a 77year old male who presents for a Medicare Subsequent Annual Wellness visit (Pt already had Initial Annual Wellness) discussed congestive heart failure, obesity, tension, hypercholesterolemia, MS, A. fib, coronary artery disea Peptic ulcer, unspecified site, unspecified as acute or chronic, without mention of hemorrhage, perforation, or obstruction     Jo's esophagus     Coronary atherosclerosis     Osteoarthrosis, unspecified whether generalized or localized, pelvic region Date    AST 48 (H) 10/04/2018    ALT 66 (H) 10/04/2018    CA 9.0 10/05/2018    ALB 3.4 10/04/2018    TSH 3.820 09/05/2018    CREATSERUM 0.95 10/05/2018    GLU 91 10/05/2018        CBC  (most recent labs)   Lab Results   Component Value Date    WBC 9.7 10/0 unspecified, Other specified disorder of rectum and anus, Peptic ulcer, unspecified site, unspecified as acute or chronic, without mention of hemorrhage, perforation, or obstruction (6/29/2012), Personal history of methicillin resistant Staphylococcus jarvis quit smoking about 41 years ago. His smoking use included cigarettes. He has a 36.00 pack-year smoking history. He has never used smokeless tobacco. He reports that he drinks about 1.2 oz of alcohol per week. He reports that he does not use drugs.      AMITA no curvature, ROM normal, no CVA tenderness   Lungs:   Clear to auscultation bilaterally, respirations unlabored   Chest Wall:  No tenderness or deformity   Heart:  Regular rate and rhythm, S1, S2 normal, no murmur,   Abdomen:   Soft, non-tender, bowel malathi management    Hypercholesterolemia on medication patient is doing well.   He says the doctor ultimately is monitoring his level and he will have a blood work done with cardiologist    Hypertension blood pressure was slightly elevated today he is taking his follow-ups on file.      Tommy Galvan MD, 11/3/2017       General Health     In the past six months, have you lost more than 10 pounds without trying?: 2 - No    Has your appetite been poor?: No    How does the patient maintain a good energy level?: S refresh your progress note to see your input here.   Cognitive Assessment     What day of the week is this?: Correct    What month is it?: Correct    What year is it?: Correct    Recall \"Ball\": Correct    Recall \"Flag\": Correct    Recall \"Tree\": Corre Hepatitis B No orders found for this or any previous visit.      Tetanus Orders placed or performed in visit on 02/25/15   • TETANUS, DIPHTHERIA TOXOIDS AND ACELLULAR PERTUSIS VACCINE (TDAP), >7 YEARS, IM USE            SPECIFIC DISEASE MONITORING Internal

## 2019-04-10 NOTE — PATIENT INSTRUCTIONS
Continue current meds. Watch diet for fats and carbs. Stay active. Do blood work for PSA with next set of blood work ordered by your cardiologist.  Fluticasone nasal spray 2 sprays nostril once a day for nasal congestion and throat clearing.     [facility] screening covered service except at the Welcome to Medicare Visit    Abdominal aortic aneurysm screening (once between ages 73-68)  No results found for this or any previous visit.  Limited to patients who meet one of the following criteria:   • Men who are Moderate/High Risk No orders found for this or any previous visit.  Medium/high risk factors:   End-stage renal disease   Hemophiliacs who received Factor VIII or IX concentrates   Clients of institutions for the mentally retarded   Persons who live in the

## 2019-06-06 ENCOUNTER — OFFICE VISIT (OUTPATIENT)
Dept: FAMILY MEDICINE CLINIC | Facility: CLINIC | Age: 69
End: 2019-06-06
Payer: MEDICARE

## 2019-06-06 VITALS
DIASTOLIC BLOOD PRESSURE: 62 MMHG | RESPIRATION RATE: 18 BRPM | TEMPERATURE: 97 F | WEIGHT: 269 LBS | SYSTOLIC BLOOD PRESSURE: 104 MMHG | HEIGHT: 67 IN | OXYGEN SATURATION: 96 % | BODY MASS INDEX: 42.22 KG/M2 | HEART RATE: 61 BPM

## 2019-06-06 DIAGNOSIS — R21 RASH AND NONSPECIFIC SKIN ERUPTION: Primary | ICD-10-CM

## 2019-06-06 DIAGNOSIS — L29.9 PRURITUS: ICD-10-CM

## 2019-06-06 PROCEDURE — 99214 OFFICE O/P EST MOD 30 MIN: CPT | Performed by: FAMILY MEDICINE

## 2019-06-06 RX ORDER — PREDNISONE 10 MG/1
TABLET ORAL
Qty: 30 TABLET | Refills: 0 | Status: SHIPPED | OUTPATIENT
Start: 2019-06-06 | End: 2019-09-18 | Stop reason: ALTCHOICE

## 2019-06-06 NOTE — PATIENT INSTRUCTIONS
Start prednisone  and take per directions. Try to take it earlier during the day as it does not prevent you from sleeping at night. Zyrtec 10 mg in the afternoon daily. Triamcinolone cream apply at night time.   Aquaphor or Vaseline just to keep the skin

## 2019-06-06 NOTE — PROGRESS NOTES
Mariluz Hsu is a 77year old male. cc rash, pruritus  HPI:   Patient is come to the office for evaluation of the rash which started in March.   It is on the upper extremities on the forearms dorsal aspect also last week started to have some lesions on the daily. Disp: 30 tablet Rfl: 0   Calcium Carbonate (CALCIUM 600 OR) Take 1 Tab by mouth daily. Disp:  Rfl:    Multiple Vitamin (MULTI VITAMIN MENS OR) Take 1 Tab by mouth daily.  Disp:  Rfl:    Ascorbic Acid (VITAMIN C CR) 1000 MG Oral Tab CR Take 1 tablet b otherwise  SKIN: Rash forearms posterior legs very itchy pruritus,   HEENT no congestion no runny nose no sore throat  Neck no neck pain  RESPIRATORY: denies shortness of breath with exertion  CARDIOVASCULAR: denies chest pain on exertion  GI: denies abdom for itching as needed. Monitor symptoms. Call office for updates after finishing prednisone course. Try to look for possible agents causing your reaction at home.    Imaging & Consults:  None    The patient indicates understanding of these issues and a

## 2019-06-19 ENCOUNTER — OFFICE VISIT (OUTPATIENT)
Dept: FAMILY MEDICINE CLINIC | Facility: CLINIC | Age: 69
End: 2019-06-19
Payer: MEDICARE

## 2019-06-19 VITALS
BODY MASS INDEX: 42.53 KG/M2 | HEIGHT: 67 IN | HEART RATE: 64 BPM | DIASTOLIC BLOOD PRESSURE: 72 MMHG | SYSTOLIC BLOOD PRESSURE: 114 MMHG | OXYGEN SATURATION: 96 % | RESPIRATION RATE: 18 BRPM | TEMPERATURE: 97 F | WEIGHT: 271 LBS

## 2019-06-19 DIAGNOSIS — R21 RASH AND NONSPECIFIC SKIN ERUPTION: Primary | ICD-10-CM

## 2019-06-19 PROCEDURE — 99213 OFFICE O/P EST LOW 20 MIN: CPT | Performed by: FAMILY MEDICINE

## 2019-06-19 RX ORDER — BETAMETHASONE DIPROPIONATE 0.5 MG/G
1 CREAM TOPICAL 2 TIMES DAILY
Qty: 60 G | Refills: 1 | Status: SHIPPED | OUTPATIENT
Start: 2019-06-19 | End: 2019-09-18 | Stop reason: ALTCHOICE

## 2019-06-20 NOTE — PROGRESS NOTES
Dustin Bundy is a 77year old male. cc rash  HPI:   Patient is come in for reevaluation of the rash. It is bilateral forearms. The rash she had on the lower extremities now resolved. He is scratching the skin because it is very itchy.   He has new lesion Carbonate (CALCIUM 600 OR) Take 1 Tab by mouth daily. Disp:  Rfl:    Multiple Vitamin (MULTI VITAMIN MENS OR) Take 1 Tab by mouth daily. Disp:  Rfl:    Ascorbic Acid (VITAMIN C CR) 1000 MG Oral Tab CR Take 1 tablet by mouth daily.    Disp:  Rfl:    Cholecal lesions, rash bilateral forearms itchy  HEENT no congestion no runny nose no sore throat  Neck no neck pain  RESPIRATORY: denies shortness of breath with exertion  CARDIOVASCULAR: denies chest pain on exertion  GI: denies abdominal pain and denies heartbur

## 2019-10-09 ENCOUNTER — LAB ENCOUNTER (OUTPATIENT)
Dept: LAB | Age: 69
End: 2019-10-09
Attending: FAMILY MEDICINE
Payer: MEDICARE

## 2019-10-09 ENCOUNTER — HOSPITAL ENCOUNTER (OUTPATIENT)
Dept: GENERAL RADIOLOGY | Age: 69
Discharge: HOME | End: 2019-10-09
Attending: FAMILY MEDICINE
Payer: MEDICARE

## 2019-10-09 ENCOUNTER — OFFICE VISIT (OUTPATIENT)
Dept: FAMILY MEDICINE CLINIC | Facility: CLINIC | Age: 69
End: 2019-10-09
Payer: MEDICARE

## 2019-10-09 ENCOUNTER — HOSPITAL ENCOUNTER (OUTPATIENT)
Dept: ULTRASOUND IMAGING | Age: 69
Discharge: HOME | End: 2019-10-09
Attending: FAMILY MEDICINE
Payer: MEDICARE

## 2019-10-09 VITALS
BODY MASS INDEX: 40.18 KG/M2 | HEART RATE: 59 BPM | SYSTOLIC BLOOD PRESSURE: 132 MMHG | WEIGHT: 256 LBS | HEIGHT: 67 IN | DIASTOLIC BLOOD PRESSURE: 72 MMHG | RESPIRATION RATE: 18 BRPM | OXYGEN SATURATION: 100 % | TEMPERATURE: 97 F

## 2019-10-09 DIAGNOSIS — M79.604 ACUTE LEG PAIN, RIGHT: ICD-10-CM

## 2019-10-09 DIAGNOSIS — M51.36 DEGENERATIVE LUMBAR DISC: ICD-10-CM

## 2019-10-09 DIAGNOSIS — Z79.899 ON AMIODARONE THERAPY: ICD-10-CM

## 2019-10-09 DIAGNOSIS — I42.0 DCM (DILATED CARDIOMYOPATHY) (HCC): ICD-10-CM

## 2019-10-09 DIAGNOSIS — I47.2 VT (VENTRICULAR TACHYCARDIA) (HCC): ICD-10-CM

## 2019-10-09 DIAGNOSIS — I48.19 PERSISTENT ATRIAL FIBRILLATION (HCC): ICD-10-CM

## 2019-10-09 DIAGNOSIS — M71.21 BAKER CYST, RIGHT: ICD-10-CM

## 2019-10-09 DIAGNOSIS — R63.8 INCREASED BMI: ICD-10-CM

## 2019-10-09 DIAGNOSIS — M25.551 ACUTE RIGHT HIP PAIN: ICD-10-CM

## 2019-10-09 DIAGNOSIS — M79.604 ACUTE LEG PAIN, RIGHT: Primary | ICD-10-CM

## 2019-10-09 DIAGNOSIS — Z12.5 SCREENING FOR PROSTATE CANCER: ICD-10-CM

## 2019-10-09 PROCEDURE — 84481 FREE ASSAY (FT-3): CPT

## 2019-10-09 PROCEDURE — 82550 ASSAY OF CK (CPK): CPT

## 2019-10-09 PROCEDURE — 73502 X-RAY EXAM HIP UNI 2-3 VIEWS: CPT | Performed by: FAMILY MEDICINE

## 2019-10-09 PROCEDURE — 84439 ASSAY OF FREE THYROXINE: CPT

## 2019-10-09 PROCEDURE — 85025 COMPLETE CBC W/AUTO DIFF WBC: CPT

## 2019-10-09 PROCEDURE — 99214 OFFICE O/P EST MOD 30 MIN: CPT | Performed by: FAMILY MEDICINE

## 2019-10-09 PROCEDURE — 93971 EXTREMITY STUDY: CPT | Performed by: FAMILY MEDICINE

## 2019-10-09 PROCEDURE — 80053 COMPREHEN METABOLIC PANEL: CPT

## 2019-10-09 PROCEDURE — 36415 COLL VENOUS BLD VENIPUNCTURE: CPT

## 2019-10-09 PROCEDURE — 84443 ASSAY THYROID STIM HORMONE: CPT

## 2019-10-09 NOTE — PATIENT INSTRUCTIONS
Do ultrasound of your right leg and x-ray of your hip today. Do blood work. We will call you with results when those are back.

## 2019-10-10 NOTE — PROGRESS NOTES
Pipe Amador is a 77year old male. cc right hip pain/right thigh pain  HPI:   Patient is come to the office for evaluation of the right hip/thigh pain. Started to have a symptoms 2 weeks ago no history of injury. No fall.   3 weeks ago patient started Sullivan Carbonate (CALCIUM 600 OR) Take 1 Tab by mouth daily. Disp:  Rfl:    Multiple Vitamin (MULTI VITAMIN MENS OR) Take 1 Tab by mouth daily. Disp:  Rfl:    Ascorbic Acid (VITAMIN C CR) 1000 MG Oral Tab CR Take 1 tablet by mouth daily.    Disp:  Rfl:    Cholecal unusual skin lesions or rashes  HEENT no congestion no runny nose no sore throat  Neck no neck pain  RESPIRATORY: denies shortness of breath with exertion  CARDIOVASCULAR: denies chest pain on exertion  GI: denies abdominal pain and denies heartburn  NEURO but that would not contribute to the pain inpatient thigh. Monitor symptoms.   X-ray shows multiple degenerative changes of the lumbar spine offered MRI patient wants to however defer he will see his neurologist.  He has a pacemaker and that would have to pain for past 2 weeks. Pain all over joint, worse lateral. Pain radiates down right leg, laterally, all the way to ankle. Right hip replacement surgery done in 1995. FINDINGS:  There is mild to moderate apex left low lumbar rotoscoliosis.   Severe

## 2020-06-22 ENCOUNTER — ORDER TRANSCRIPTION (OUTPATIENT)
Dept: SLEEP CENTER | Age: 70
End: 2020-06-22

## 2020-06-22 DIAGNOSIS — I25.10 CORONARY HEART DISEASE: ICD-10-CM

## 2020-06-22 DIAGNOSIS — G47.33 OBSTRUCTIVE SLEEP APNEA: Primary | ICD-10-CM

## 2020-06-30 ENCOUNTER — OFFICE VISIT (OUTPATIENT)
Dept: SLEEP CENTER | Age: 70
End: 2020-06-30
Attending: INTERNAL MEDICINE
Payer: MEDICARE

## 2020-06-30 DIAGNOSIS — I25.10 CORONARY HEART DISEASE: ICD-10-CM

## 2020-06-30 DIAGNOSIS — G47.33 OBSTRUCTIVE SLEEP APNEA: ICD-10-CM

## 2020-06-30 PROCEDURE — 95810 POLYSOM 6/> YRS 4/> PARAM: CPT

## 2020-07-06 NOTE — PROCEDURES
[address] [address],[facility] [address]       Accredited by the [facility] ([facility])    PATIENT'S NAME:        Breanna Tran  ATTENDING PHYSICIAN:   Sergio Galarza M.D.   REFERRING PHYSICIAN:   Dr. Steve Gomes 10:17 p.m. and lights on at 5:20 a.m., total sleep time of 344 minutes. Sleep efficiency of 81.3%. Sleep latency of 19.2 minutes. Wake after sleep onset was 59 minutes. During sleep, all stages of sleep were seen.   Slow-wave sleep comprised 0.1% of tot would likely have an ameliorating effect on the degree of sleep-disordered breathing identified. 5.   If daytime sleepiness is a complaint, the patient needs to understand the potential dangers associated with reduced daytime vigilance.   6.   Consider fur

## 2020-09-18 ENCOUNTER — APPOINTMENT (OUTPATIENT)
Dept: CT IMAGING | Facility: HOSPITAL | Age: 70
End: 2020-09-18
Attending: EMERGENCY MEDICINE
Payer: MEDICARE

## 2020-09-18 ENCOUNTER — HOSPITAL ENCOUNTER (EMERGENCY)
Facility: HOSPITAL | Age: 70
Discharge: HOME | End: 2020-09-18
Attending: EMERGENCY MEDICINE
Payer: MEDICARE

## 2020-09-18 VITALS
SYSTOLIC BLOOD PRESSURE: 125 MMHG | BODY MASS INDEX: 35.07 KG/M2 | HEIGHT: 70 IN | WEIGHT: 245 LBS | RESPIRATION RATE: 18 BRPM | OXYGEN SATURATION: 98 % | TEMPERATURE: 98 F | DIASTOLIC BLOOD PRESSURE: 72 MMHG | HEART RATE: 60 BPM

## 2020-09-18 DIAGNOSIS — S00.83XA CONTUSION OF FACE, INITIAL ENCOUNTER: Primary | ICD-10-CM

## 2020-09-18 DIAGNOSIS — S00.12XA PERIORBITAL ECCHYMOSIS OF LEFT EYE, INITIAL ENCOUNTER: ICD-10-CM

## 2020-09-18 PROCEDURE — 99284 EMERGENCY DEPT VISIT MOD MDM: CPT

## 2020-09-18 PROCEDURE — 70450 CT HEAD/BRAIN W/O DYE: CPT | Performed by: EMERGENCY MEDICINE

## 2020-09-18 NOTE — ED PROVIDER NOTES
Patient Seen in: [facility] Emergency Department      History   Patient presents with:  Contusion    Stated Complaint: trip and fall on blood thiners    HPI    Patient follows up with cardiology.   He has a history of coronary artery disease status p High blood pressure    • Hyperlipidemia    • Hypertension    • Long term (current) use of anticoagulants    • Multiple sclerosis (HCC)    • Neuropathy    • Obesity, unspecified    • Other specified disorder of rectum and anus    • Peptic ulcer, unspecified 43.1      Smokeless tobacco: Never Used    Alcohol use: Yes      Alcohol/week: 2.0 standard drinks      Types: 2 Standard drinks or equivalent per week      Comment: social beers    Drug use:  No             Review of Systems    Positive for stated complain frontal/periorbital soft tissue swelling present.  No acute intracranial hemorrhage is seen.  A background of moderate chronic microvascular ischemic changes is likely present and stable from the prior exam.       I reviewed the results of the work-up.   At

## 2020-09-18 NOTE — ED NOTES
RN at bedside. Plan of care discussed with patient and wife, no questions at this time. Warm blanket offered, patient declines. Pillow provided. Call light within reach. Will continue to monitor.

## 2020-09-18 NOTE — ED INITIAL ASSESSMENT (HPI)
Trip and fall yesterday. Hit left eye/head. Denies LOC. On Eliquis. States tripped over own two feet and fell. Denies dizziness, CP, or SOB.

## 2021-01-26 DIAGNOSIS — Z23 NEED FOR VACCINATION: ICD-10-CM

## 2021-02-10 ENCOUNTER — IMMUNIZATION (OUTPATIENT)
Dept: LAB | Age: 71
End: 2021-02-10
Attending: HOSPITALIST
Payer: MEDICARE

## 2021-02-10 DIAGNOSIS — Z23 NEED FOR VACCINATION: Primary | ICD-10-CM

## 2021-02-10 PROCEDURE — 0001A SARSCOV2 VAC 30MCG/0.3ML IM: CPT

## 2021-03-03 ENCOUNTER — IMMUNIZATION (OUTPATIENT)
Dept: LAB | Age: 71
End: 2021-03-03
Attending: HOSPITALIST
Payer: MEDICARE

## 2021-03-03 DIAGNOSIS — Z23 NEED FOR VACCINATION: Primary | ICD-10-CM

## 2021-03-03 PROCEDURE — 0002A SARSCOV2 VAC 30MCG/0.3ML IM: CPT

## 2021-03-04 ENCOUNTER — HOSPITAL ENCOUNTER (OUTPATIENT)
Facility: HOSPITAL | Age: 71
Setting detail: OBSERVATION
Discharge: HOME | End: 2021-03-05
Attending: EMERGENCY MEDICINE | Admitting: HOSPITALIST
Payer: MEDICARE

## 2021-03-04 ENCOUNTER — APPOINTMENT (OUTPATIENT)
Dept: GENERAL RADIOLOGY | Facility: HOSPITAL | Age: 71
End: 2021-03-04
Attending: EMERGENCY MEDICINE
Payer: MEDICARE

## 2021-03-04 DIAGNOSIS — R77.8 TROPONIN LEVEL ELEVATED: ICD-10-CM

## 2021-03-04 DIAGNOSIS — R63.8 INCREASED BMI: ICD-10-CM

## 2021-03-04 DIAGNOSIS — R50.9 ACUTE FEBRILE ILLNESS: Primary | ICD-10-CM

## 2021-03-04 DIAGNOSIS — I42.0 DCM (DILATED CARDIOMYOPATHY) (HCC): ICD-10-CM

## 2021-03-04 DIAGNOSIS — R53.1 WEAKNESS GENERALIZED: ICD-10-CM

## 2021-03-04 DIAGNOSIS — Z79.899 ON AMIODARONE THERAPY: ICD-10-CM

## 2021-03-04 DIAGNOSIS — I47.2 VT (VENTRICULAR TACHYCARDIA) (HCC): ICD-10-CM

## 2021-03-04 PROBLEM — R79.89 TROPONIN LEVEL ELEVATED: Status: ACTIVE | Noted: 2021-03-04

## 2021-03-04 LAB
ALBUMIN SERPL-MCNC: 3.7 G/DL (ref 3.4–5)
ALBUMIN/GLOB SERPL: 1.2 {RATIO} (ref 1–2)
ALP LIVER SERPL-CCNC: 104 U/L
ALT SERPL-CCNC: 62 U/L
ANION GAP SERPL CALC-SCNC: 7 MMOL/L (ref 0–18)
ANION GAP SERPL CALC-SCNC: 8 MMOL/L (ref 0–18)
APTT PPP: 35.8 SECONDS (ref 25.4–36.1)
AST SERPL-CCNC: 54 U/L (ref 15–37)
ATRIAL RATE: 72 BPM
ATRIAL RATE: 78 BPM
BASOPHILS # BLD AUTO: 0.04 X10(3) UL (ref 0–0.2)
BASOPHILS # BLD AUTO: 0.06 X10(3) UL (ref 0–0.2)
BASOPHILS NFR BLD AUTO: 0.4 %
BASOPHILS NFR BLD AUTO: 0.5 %
BILIRUB SERPL-MCNC: 1.2 MG/DL (ref 0.1–2)
BILIRUB UR QL STRIP.AUTO: NEGATIVE
BUN BLD-MCNC: 19 MG/DL (ref 7–18)
BUN BLD-MCNC: 19 MG/DL (ref 7–18)
BUN/CREAT SERPL: 15 (ref 10–20)
BUN/CREAT SERPL: 17.6 (ref 10–20)
CALCIUM BLD-MCNC: 9.1 MG/DL (ref 8.5–10.1)
CALCIUM BLD-MCNC: 9.2 MG/DL (ref 8.5–10.1)
CHLORIDE SERPL-SCNC: 106 MMOL/L (ref 98–112)
CHLORIDE SERPL-SCNC: 107 MMOL/L (ref 98–112)
CK SERPL-CCNC: 165 U/L
CLARITY UR REFRACT.AUTO: CLEAR
CO2 SERPL-SCNC: 22 MMOL/L (ref 21–32)
CO2 SERPL-SCNC: 24 MMOL/L (ref 21–32)
COLOR UR AUTO: YELLOW
CREAT BLD-MCNC: 1.08 MG/DL
CREAT BLD-MCNC: 1.27 MG/DL
DEPRECATED RDW RBC AUTO: 45.6 FL (ref 35.1–46.3)
DEPRECATED RDW RBC AUTO: 46.4 FL (ref 35.1–46.3)
EOSINOPHIL # BLD AUTO: 0.01 X10(3) UL (ref 0–0.7)
EOSINOPHIL # BLD AUTO: 0.03 X10(3) UL (ref 0–0.7)
EOSINOPHIL NFR BLD AUTO: 0.1 %
EOSINOPHIL NFR BLD AUTO: 0.3 %
ERYTHROCYTE [DISTWIDTH] IN BLOOD BY AUTOMATED COUNT: 13.2 % (ref 11–15)
ERYTHROCYTE [DISTWIDTH] IN BLOOD BY AUTOMATED COUNT: 13.2 % (ref 11–15)
GLOBULIN PLAS-MCNC: 3.1 G/DL (ref 2.8–4.4)
GLUCOSE BLD-MCNC: 92 MG/DL (ref 70–99)
GLUCOSE BLD-MCNC: 98 MG/DL (ref 70–99)
GLUCOSE UR STRIP.AUTO-MCNC: NEGATIVE MG/DL
HAV IGM SER QL: 2.2 MG/DL (ref 1.6–2.6)
HCT VFR BLD AUTO: 44.5 %
HCT VFR BLD AUTO: 46.9 %
HGB BLD-MCNC: 14.5 G/DL
HGB BLD-MCNC: 15.6 G/DL
IMM GRANULOCYTES # BLD AUTO: 0.05 X10(3) UL (ref 0–1)
IMM GRANULOCYTES # BLD AUTO: 0.06 X10(3) UL (ref 0–1)
IMM GRANULOCYTES NFR BLD: 0.4 %
IMM GRANULOCYTES NFR BLD: 0.6 %
INR BLD: 1.36 (ref 0.89–1.11)
KETONES UR STRIP.AUTO-MCNC: NEGATIVE MG/DL
LACTATE SERPL-SCNC: 1.4 MMOL/L (ref 0.4–2)
LEUKOCYTE ESTERASE UR QL STRIP.AUTO: NEGATIVE
LYMPHOCYTES # BLD AUTO: 0.51 X10(3) UL (ref 1–4)
LYMPHOCYTES # BLD AUTO: 0.61 X10(3) UL (ref 1–4)
LYMPHOCYTES NFR BLD AUTO: 4.4 %
LYMPHOCYTES NFR BLD AUTO: 5.7 %
M PROTEIN MFR SERPL ELPH: 6.8 G/DL (ref 6.4–8.2)
MCH RBC QN AUTO: 31.2 PG (ref 26–34)
MCH RBC QN AUTO: 31.6 PG (ref 26–34)
MCHC RBC AUTO-ENTMCNC: 32.6 G/DL (ref 31–37)
MCHC RBC AUTO-ENTMCNC: 33.3 G/DL (ref 31–37)
MCV RBC AUTO: 94.9 FL
MCV RBC AUTO: 95.7 FL
MONOCYTES # BLD AUTO: 0.75 X10(3) UL (ref 0.1–1)
MONOCYTES # BLD AUTO: 0.92 X10(3) UL (ref 0.1–1)
MONOCYTES NFR BLD AUTO: 7 %
MONOCYTES NFR BLD AUTO: 8 %
NEUTROPHILS # BLD AUTO: 9.25 X10 (3) UL (ref 1.5–7.7)
NEUTROPHILS # BLD AUTO: 9.25 X10(3) UL (ref 1.5–7.7)
NEUTROPHILS # BLD AUTO: 9.98 X10 (3) UL (ref 1.5–7.7)
NEUTROPHILS # BLD AUTO: 9.98 X10(3) UL (ref 1.5–7.7)
NEUTROPHILS NFR BLD AUTO: 86.2 %
NEUTROPHILS NFR BLD AUTO: 86.4 %
NITRITE UR QL STRIP.AUTO: NEGATIVE
OSMOLALITY SERPL CALC.SUM OF ELEC: 286 MOSM/KG (ref 275–295)
OSMOLALITY SERPL CALC.SUM OF ELEC: 286 MOSM/KG (ref 275–295)
P AXIS: 15 DEGREES
P AXIS: 77 DEGREES
P-R INTERVAL: 182 MS
P-R INTERVAL: 196 MS
PH UR STRIP.AUTO: 6 [PH] (ref 5–8)
PLATELET # BLD AUTO: 203 10(3)UL (ref 150–450)
PLATELET # BLD AUTO: 207 10(3)UL (ref 150–450)
POTASSIUM SERPL-SCNC: 3.2 MMOL/L (ref 3.5–5.1)
POTASSIUM SERPL-SCNC: 3.4 MMOL/L (ref 3.5–5.1)
POTASSIUM SERPL-SCNC: 4 MMOL/L (ref 3.5–5.1)
PROT UR STRIP.AUTO-MCNC: NEGATIVE MG/DL
PSA SERPL DL<=0.01 NG/ML-MCNC: 17.2 SECONDS (ref 12.4–14.6)
Q-T INTERVAL: 356 MS
Q-T INTERVAL: 398 MS
QRS DURATION: 104 MS
QRS DURATION: 110 MS
QTC CALCULATION (BEZET): 405 MS
QTC CALCULATION (BEZET): 435 MS
R AXIS: 16 DEGREES
R AXIS: 41 DEGREES
RBC # BLD AUTO: 4.65 X10(6)UL
RBC # BLD AUTO: 4.94 X10(6)UL
RBC UR QL AUTO: NEGATIVE
SARS-COV-2 RNA RESP QL NAA+PROBE: NOT DETECTED
SODIUM SERPL-SCNC: 137 MMOL/L (ref 136–145)
SODIUM SERPL-SCNC: 137 MMOL/L (ref 136–145)
SP GR UR STRIP.AUTO: 1.01 (ref 1–1.03)
T AXIS: -23 DEGREES
T AXIS: 24 DEGREES
TROPONIN I SERPL-MCNC: 0.12 NG/ML (ref ?–0.04)
TROPONIN I SERPL-MCNC: 0.12 NG/ML (ref ?–0.04)
UROBILINOGEN UR STRIP.AUTO-MCNC: <2 MG/DL
VENTRICULAR RATE: 72 BPM
VENTRICULAR RATE: 78 BPM
WBC # BLD AUTO: 10.7 X10(3) UL (ref 4–11)
WBC # BLD AUTO: 11.6 X10(3) UL (ref 4–11)

## 2021-03-04 PROCEDURE — 99220 INITIAL OBSERVATION CARE,LEVL III: CPT | Performed by: HOSPITALIST

## 2021-03-04 PROCEDURE — 71045 X-RAY EXAM CHEST 1 VIEW: CPT | Performed by: EMERGENCY MEDICINE

## 2021-03-04 RX ORDER — IBUPROFEN 400 MG/1
400 TABLET ORAL ONCE
Status: COMPLETED | OUTPATIENT
Start: 2021-03-04 | End: 2021-03-04

## 2021-03-04 RX ORDER — BISACODYL 10 MG
10 SUPPOSITORY, RECTAL RECTAL
Status: DISCONTINUED | OUTPATIENT
Start: 2021-03-04 | End: 2021-03-05

## 2021-03-04 RX ORDER — ACETAMINOPHEN 325 MG/1
650 TABLET ORAL EVERY 6 HOURS
Status: DISCONTINUED | OUTPATIENT
Start: 2021-03-04 | End: 2021-03-05

## 2021-03-04 RX ORDER — ACETAMINOPHEN 500 MG
1000 TABLET ORAL ONCE
Status: COMPLETED | OUTPATIENT
Start: 2021-03-04 | End: 2021-03-04

## 2021-03-04 RX ORDER — AMIODARONE HYDROCHLORIDE 200 MG/1
200 TABLET ORAL DAILY
Status: DISCONTINUED | OUTPATIENT
Start: 2021-03-04 | End: 2021-03-05

## 2021-03-04 RX ORDER — ASPIRIN 81 MG/1
324 TABLET, CHEWABLE ORAL ONCE
Status: COMPLETED | OUTPATIENT
Start: 2021-03-04 | End: 2021-03-04

## 2021-03-04 RX ORDER — FUROSEMIDE 20 MG/1
20 TABLET ORAL DAILY
Qty: 30 TABLET | Refills: 3 | Status: SHIPPED | OUTPATIENT
Start: 2021-03-04 | End: 2021-03-05

## 2021-03-04 RX ORDER — IBUPROFEN 600 MG/1
600 TABLET ORAL ONCE
Status: COMPLETED | OUTPATIENT
Start: 2021-03-04 | End: 2021-03-04

## 2021-03-04 RX ORDER — ONDANSETRON 2 MG/ML
8 INJECTION INTRAMUSCULAR; INTRAVENOUS EVERY 6 HOURS PRN
Status: DISCONTINUED | OUTPATIENT
Start: 2021-03-04 | End: 2021-03-05

## 2021-03-04 RX ORDER — POTASSIUM CHLORIDE 20 MEQ/1
40 TABLET, EXTENDED RELEASE ORAL EVERY 4 HOURS
Status: COMPLETED | OUTPATIENT
Start: 2021-03-04 | End: 2021-03-04

## 2021-03-04 RX ORDER — SODIUM CHLORIDE 9 MG/ML
INJECTION, SOLUTION INTRAVENOUS CONTINUOUS
Status: ACTIVE | OUTPATIENT
Start: 2021-03-04 | End: 2021-03-04

## 2021-03-04 RX ORDER — ATORVASTATIN CALCIUM 40 MG/1
40 TABLET, FILM COATED ORAL NIGHTLY
Status: DISCONTINUED | OUTPATIENT
Start: 2021-03-04 | End: 2021-03-05

## 2021-03-04 RX ORDER — ACETAZOLAMIDE 250 MG/1
250 TABLET ORAL 2 TIMES DAILY
COMMUNITY
Start: 2021-01-29 | End: 2021-08-05

## 2021-03-04 RX ORDER — ACETAMINOPHEN 325 MG/1
650 TABLET ORAL EVERY 6 HOURS PRN
Status: DISCONTINUED | OUTPATIENT
Start: 2021-03-04 | End: 2021-03-04

## 2021-03-04 RX ORDER — MELATONIN
3 NIGHTLY PRN
Status: DISCONTINUED | OUTPATIENT
Start: 2021-03-04 | End: 2021-03-05

## 2021-03-04 RX ORDER — LOSARTAN POTASSIUM 25 MG/1
25 TABLET ORAL DAILY
Status: ON HOLD | COMMUNITY
End: 2021-03-04

## 2021-03-04 RX ORDER — POLYETHYLENE GLYCOL 3350 17 G/17G
17 POWDER, FOR SOLUTION ORAL DAILY PRN
Status: DISCONTINUED | OUTPATIENT
Start: 2021-03-04 | End: 2021-03-05

## 2021-03-04 RX ORDER — ONDANSETRON 2 MG/ML
4 INJECTION INTRAMUSCULAR; INTRAVENOUS EVERY 4 HOURS PRN
Status: DISCONTINUED | OUTPATIENT
Start: 2021-03-04 | End: 2021-03-04 | Stop reason: SDUPTHER

## 2021-03-04 RX ORDER — SODIUM CHLORIDE 9 MG/ML
INJECTION, SOLUTION INTRAVENOUS CONTINUOUS
Status: ACTIVE | OUTPATIENT
Start: 2021-03-04 | End: 2021-03-05

## 2021-03-04 NOTE — HOME CARE LIAISON
Received referral from [facility]. Met with patient at the bedside and provided choice. Patient is agreeable to [facility]. Residential brochure provided with contact information. All questions addressed and answered.      QA data [employer]

## 2021-03-04 NOTE — CONSULTS
[facility] Cardiology Consultation Thony Young MD    The patient was interviewed, examined, the chart was reviewed and the consult was dictated. This is a 65year old male with a chief complaint of weakness. Impression:  1.   Weakness, fever–post Covid vac

## 2021-03-04 NOTE — ED PROVIDER NOTES
Patient Seen in: [facility] Emergency Department      History   Patient presents with:  Fatigue    Stated Complaint: Fatigue, post COIVD vaccine     HPI/Subjective:   HPI    64-year-old male with past medical history of atrial fibrillation on Eliqui Past Surgical History:   Procedure Laterality Date   • APPENDECTOMY  1991   • CABG  1998   • CARDIAC DEFIBRILLATOR PLACEMENT      [employer], not pacer dependent.   Magnet response inhibit therapy   • COLONOSCOPY  1/2016    Dr Alisa Garcia due i nursing note reviewed. Constitutional:       Appearance: Normal appearance. HENT:      Head: Normocephalic and atraumatic.       Nose: Nose normal.      Mouth/Throat:      Mouth: Mucous membranes are moist.   Eyes:      Extraocular Movements: Extraocula components within normal limits   URINALYSIS WITH CULTURE REFLEX - Normal   LACTIC ACID, PLASMA - Normal   CK CREATINE KINASE (NOT CREATININE) - Normal   PTT, ACTIVATED - Normal   RAPID SARS-COV-2 BY PCR - Normal   CBC WITH DIFFERENTIAL WITH PLATELET    Na unchanged. Case reviewed with on-call for cardiology Dr. Derick Ortez. Recommendation is for aspirin and admission for further monitoring. Patient and wife updated on results and agreeable to admission.   I spoke with the hospitalist in regards to admiss

## 2021-03-04 NOTE — ED INITIAL ASSESSMENT (HPI)
57-year-old male arrived to the ED via EMS. C/O new onset weakness after getting the second [employer] vaccine today at [facility]. Was unable to get up from bed tonight. A&Ox4, denies any N/V/D.

## 2021-03-04 NOTE — PLAN OF CARE
Up in chair, ambulated in the halls with walker x1  No sob, denies chest pain, no fever, continue IVF  C/o right hip,leg pain , Tylenol and Ibuprofen given with help  PT here rec home with [facility]  Pt still c/o very tired and fatigue ,not ready to discharge to mobility/gait  Description: Interventions:  - Assess patient's functional ability and stability  - Promote increasing activity/tolerance for mobility and gait  - Educate and engage patient/family in tolerated activity level and precautions    Outcome: Prog

## 2021-03-04 NOTE — PROGRESS NOTES
NURSING ADMISSION NOTE      Patient admitted via Cart  Oriented to room. Safety precautions initiated. Bed in low position. Call light in reach. Admission navigator completed. Med rec verified. Pt is A&O x4, very fatigued. VSS- NSR on tele.  A

## 2021-03-04 NOTE — PHYSICAL THERAPY NOTE
PHYSICAL THERAPY EVALUATION - INPATIENT     Room Number: 4251/6970-X  Evaluation Date: 3/4/2021  Type of Evaluation: Initial  Physician Order: PT Eval and Treat    Presenting Problem: Acute Febrile Illness  Reason for Therapy: Mobility Dysfunction an impairment        Past Surgical History  Past Surgical History:   Procedure Laterality Date   • APPENDECTOMY  1991   • CABG  1998   • CARDIAC DEFIBRILLATOR PLACEMENT      [employer], not pacer dependent.   Magnet response inhibit therapy   • COLONOSC STRENGTH ASSESSMENT    Lower extremity ROM is within functional limits     Lower extremity strength is within functional limits except for the following:    Right Hip flexion  3+/5  Left Hip flexion  4-/5  Right Knee extension  3+/5  Left Knee extension  4 ambulating 265 ft with RW with writer managing IV pole and chair follow by PT. Pt able to follow all verbal cues for directions with no issues.  Pt able to perform simulated stoop step up with left foot/leg initiation with weight shifting forward onto stanc Goal #3 Patient is able to ambulate 250 feet with assist device: walker - rolling at assistance level: supervision     Goal #4 Patient is able to ascend/ descend 4 steps with railings at assistance level supervision    Goal #5    Goal #6    Goal Comments

## 2021-03-04 NOTE — CONSULTS
[facility]    PATIENT'S NAME: Nikki Euceda   ATTENDING PHYSICIAN: Cristian Adames DO   CONSULTING PHYSICIAN: Angelica Flores M.D.    PATIENT ACCOUNT#:   [de-identified]    LOCATION:  [address] [place] [address]  MEDICAL RECORD #:   [id number]       DATE OF BIRTH:  [birth year] Significant for coronary disease, bypass surgery, MS, esophagitis, rotator cuff injury, spinal stenosis, ventricular arrhythmia, hypertension, anti-platelet therapy, carotid stenosis, atrial fibrillation, chronic back pain, obesity, neuropathy.      MEDICAT 11:52:01  [facility] [id number]  YZG/

## 2021-03-05 ENCOUNTER — TELEPHONE (OUTPATIENT)
Dept: FAMILY MEDICINE CLINIC | Facility: CLINIC | Age: 71
End: 2021-03-05

## 2021-03-05 VITALS
DIASTOLIC BLOOD PRESSURE: 58 MMHG | RESPIRATION RATE: 16 BRPM | HEART RATE: 60 BPM | HEIGHT: 67 IN | SYSTOLIC BLOOD PRESSURE: 123 MMHG | TEMPERATURE: 98 F | BODY MASS INDEX: 39.44 KG/M2 | WEIGHT: 251.31 LBS | OXYGEN SATURATION: 100 %

## 2021-03-05 PROCEDURE — 99217 OBSERVATION CARE DISCHARGE: CPT | Performed by: INTERNAL MEDICINE

## 2021-03-05 RX ORDER — LOSARTAN POTASSIUM 25 MG/1
TABLET ORAL
Qty: 30 TABLET | Refills: 11 | Status: SHIPPED | OUTPATIENT
Start: 2021-03-05 | End: 2021-08-05

## 2021-03-05 RX ORDER — FUROSEMIDE 20 MG/1
20 TABLET ORAL DAILY
Qty: 30 TABLET | Refills: 3 | Status: SHIPPED | OUTPATIENT
Start: 2021-03-05 | End: 2021-11-23

## 2021-03-05 NOTE — PLAN OF CARE
Ambulated in the halls tolerated fine, awake. alert, no sob, room air  No fever  Right hip ,right leg pain better on scheduled dose Tylenol given  Discharge plan today

## 2021-03-05 NOTE — PLAN OF CARE
Received pt at 1930. A&Ox4. NSR/ a paced on tele, lung sounds clear, on RA. CPAP @ night. No complaints of pain, pt states feeling a lot better tonight. No fevers. SCDs on. Up w/1 xwalker. Pt updated on poc and resting in bed.   Problem: METABOLIC/FLUID AND

## 2021-03-05 NOTE — PHYSICAL THERAPY NOTE
PHYSICAL THERAPY TREATMENT NOTE - INPATIENT    Room Number: 9629/9241-R     Session: 1   Number of Visits to Meet Established Goals: 3    Presenting Problem: Acute Febrile Illness    Problem List  Principal Problem:    Acute febrile illness  Active Proble APPENDECTOMY  1991   • CABG  1998   • CARDIAC DEFIBRILLATOR PLACEMENT      [employer], not pacer dependent.   Magnet response inhibit therapy   • COLONOSCOPY  1/2016    Dr Lizzie Stahl due in 5 years   • LUMBAR EPIDURAL N/A 8/14/2013    Performed by Jaime Aguilera, Moving to and from a bed to a chair (including a wheelchair)?: A Little   -   Need to walk in hospital room?: None   -   Climbing 3-5 steps with a railing?: A Little       AM-PAC Score:  Raw Score: 20   Approx Degree of Impairment: 35.83%   Standardized [facility] health PT     PLAN  PT Treatment Plan: Endurance; Patient education;Gait training;Stoop training;Stair training;Transfer training;Balance training  Rehab Potential : Good  Frequency (Obs): (2-3x/week)    CURRENT GOALS   Goal #1 Patient is able to demonstrat

## 2021-03-05 NOTE — DISCHARGE SUMMARY
[facility] [place] HOSPITALIST  DISCHARGE SUMMARY     [place] Patient Status:  Observation    10/16/1950 MRN [id number]   [facility] 2NE-A Attending Shante Johnston, DO   Hosp Day # 0 PCP Janeth Amos MD     Date of Admission: 3/4/2021 ENZO  What changed:   · how much to take  · how to take this  · when to take this  · additional instructions      Take 1 tablet (20 mg total) by mouth daily.    Quantity: 30 tablet  Refills: 3     Losartan Potassium 25 MG Tabs  Commonly known as: DIONISIO CHAVEZ These medications were sent to [facility] [phone], [phone]  [address], [address] [place] [address]    Phone: [phone]   · furosemide 20 MG Tabs  · Losartan Potassium 25 MG Tabs         ILPMP r

## 2021-03-05 NOTE — TELEPHONE ENCOUNTER
Libby Crabtree nurse from [facility]   Pt planned discharge today  Dx Gen Weakness   Has Nsg and PT order   Needs your \"ok\" to start the plan of care   Pls advise   Thank you

## 2021-03-05 NOTE — PROGRESS NOTES
Up walking in the hallway with walker. Wife on bedside,denies c/o discomfort. no sob room air. DC instruction given-verbalized understanding. DC tele. DC hl.

## 2021-03-05 NOTE — PROGRESS NOTES
[facility] Cardiology Progress Note - Belinda Whitley Patient Status:  Observation    10/16/1950 MRN [id number]   [facility] 2NE-A Attending Johnson [place] Day # 0 PCP Melita Camacho MD     Subjective bilateral back pain     Obesity     Hypokalemia     Elevated troponin     Dyspnea, unspecified type     At risk for falling     Unstable angina (HCC)     Systolic CHF, chronic (HCC)     Benign essential HTN     NSTEMI (non-ST elevated myocardial infarction 3. 4* 3.2* 4.0    107  --    CO2 24.0 22.0  --    BUN 19* 19*  --    CREATSERUM 1.27 1.08  --    CA 9.2 9.1  --    MG  --  2.2  --    GLU 92 98  --        Recent Labs   Lab 03/04/21  0249   ALT 62*   AST 54*   ALB 3.7       Recent Labs   Lab 03/04/21

## 2021-03-08 ENCOUNTER — PATIENT OUTREACH (OUTPATIENT)
Dept: CASE MANAGEMENT | Age: 71
End: 2021-03-08

## 2021-03-08 ENCOUNTER — VIRTUAL PHONE E/M (OUTPATIENT)
Dept: INTERNAL MEDICINE CLINIC | Facility: CLINIC | Age: 71
End: 2021-03-08
Payer: MEDICARE

## 2021-03-08 DIAGNOSIS — R53.1 WEAKNESS GENERALIZED: ICD-10-CM

## 2021-03-08 DIAGNOSIS — R50.9 ACUTE FEBRILE ILLNESS: Primary | ICD-10-CM

## 2021-03-08 DIAGNOSIS — Z02.9 ENCOUNTERS FOR UNSPECIFIED ADMINISTRATIVE PURPOSE: ICD-10-CM

## 2021-03-08 DIAGNOSIS — R77.8 ELEVATED TROPONIN: ICD-10-CM

## 2021-03-08 PROCEDURE — 99442 PHONE E/M BY PHYS 11-20 MIN: CPT | Performed by: NURSE PRACTITIONER

## 2021-03-08 PROCEDURE — 1111F DSCHRG MED/CURRENT MED MERGE: CPT | Performed by: NURSE PRACTITIONER

## 2021-03-08 NOTE — PROGRESS NOTES
Virtual/Telephone Check-In  [address] [place] [address] verbally consents to a Virtual/Telephone Check-In service on 03/08/21.   Patient understands and accepts financial responsibility for any deductible, co-insurance and/or co-pays associated with this Telephone visit with your provider. Please be available at your phone so that your physician can contact you, and be prepared with any questions or concerns. You may be billed a copay at a later time, depending upon your insurance.   As always, your healt Eric Mayer, APRN, 3/8/2021  [address] [place] [address]  [phone]

## 2021-03-08 NOTE — PROGRESS NOTES
[facility] PHARMACIST MEDICATION RECONCILIATION        Moiz Samuel MRN [id number]    10/16/1950 PCP Claudette Chaudhary MD       Comments: Medication history completed by the [facility] Pharmacist with the patient using real since discharge. Patient states he is still feeling weak, but does think he is getting a little better every day. No questions or concerns.     Reviewed all medications in detail with patient including dose, indication, timing of administration, monitorin

## 2021-03-08 NOTE — TELEPHONE ENCOUNTER
Call back from [place] RN[facility]-confirms received dr Edgar brock to initiate home health services-will proceed.

## 2021-03-08 NOTE — PROGRESS NOTES
[facility] planned to contact patient for TCM, however, a Hospital FU appointment was completed with the [facility] on 3/8/2021. [facility] closing encounter.

## 2021-03-10 ENCOUNTER — TELEPHONE (OUTPATIENT)
Dept: FAMILY MEDICINE CLINIC | Facility: CLINIC | Age: 71
End: 2021-03-10

## 2021-03-10 NOTE — TELEPHONE ENCOUNTER
Noted. Call back to [facility] home health reaches identified confidential voice mail. Left vmm advising dr Betty Dorsey agrees w plan of care.   Advised if any other questions, call back and speak w triage nurse

## 2021-03-10 NOTE — TELEPHONE ENCOUNTER
Sidra Mckeon from [place] [address] called to share her plan of care for pt. Therapy 2 x's a week for 3 wk's working on fall prevention,gait and strengthening.

## 2021-03-12 ENCOUNTER — LAB ENCOUNTER (OUTPATIENT)
Dept: LAB | Age: 71
End: 2021-03-12
Attending: FAMILY MEDICINE
Payer: MEDICARE

## 2021-03-12 ENCOUNTER — OFFICE VISIT (OUTPATIENT)
Dept: FAMILY MEDICINE CLINIC | Facility: CLINIC | Age: 71
End: 2021-03-12
Payer: MEDICARE

## 2021-03-12 VITALS
DIASTOLIC BLOOD PRESSURE: 64 MMHG | BODY MASS INDEX: 39.71 KG/M2 | SYSTOLIC BLOOD PRESSURE: 98 MMHG | TEMPERATURE: 97 F | HEIGHT: 67 IN | WEIGHT: 253 LBS | OXYGEN SATURATION: 99 % | HEART RATE: 69 BPM | RESPIRATION RATE: 18 BRPM

## 2021-03-12 DIAGNOSIS — R21 RASH AND NONSPECIFIC SKIN ERUPTION: ICD-10-CM

## 2021-03-12 DIAGNOSIS — I48.20 CHRONIC ATRIAL FIBRILLATION (HCC): ICD-10-CM

## 2021-03-12 DIAGNOSIS — Z95.1 S/P CABG (CORONARY ARTERY BYPASS GRAFT): ICD-10-CM

## 2021-03-12 DIAGNOSIS — Z12.5 SCREENING FOR PROSTATE CANCER: ICD-10-CM

## 2021-03-12 DIAGNOSIS — Z99.89 OSA ON CPAP: ICD-10-CM

## 2021-03-12 DIAGNOSIS — I50.22 SYSTOLIC CHF, CHRONIC (HCC): ICD-10-CM

## 2021-03-12 DIAGNOSIS — I25.110 CORONARY ARTERY DISEASE INVOLVING NATIVE CORONARY ARTERY OF NATIVE HEART WITH UNSTABLE ANGINA PECTORIS (HCC): ICD-10-CM

## 2021-03-12 DIAGNOSIS — E78.00 PURE HYPERCHOLESTEROLEMIA: ICD-10-CM

## 2021-03-12 DIAGNOSIS — I47.2 VENTRICULAR TACHYARRHYTHMIA (HCC): ICD-10-CM

## 2021-03-12 DIAGNOSIS — E66.01 OBESITY, MORBID, BMI 40.0-49.9 (HCC): ICD-10-CM

## 2021-03-12 DIAGNOSIS — Z00.00 ENCOUNTER FOR ANNUAL HEALTH EXAMINATION: Primary | ICD-10-CM

## 2021-03-12 DIAGNOSIS — G47.33 OSA ON CPAP: ICD-10-CM

## 2021-03-12 DIAGNOSIS — I10 ESSENTIAL HYPERTENSION: ICD-10-CM

## 2021-03-12 DIAGNOSIS — G35 MULTIPLE SCLEROSIS (HCC): ICD-10-CM

## 2021-03-12 DIAGNOSIS — T50.Z95A ADVERSE EFFECT OF VACCINE, INITIAL ENCOUNTER: ICD-10-CM

## 2021-03-12 DIAGNOSIS — I48.19 PERSISTENT ATRIAL FIBRILLATION (HCC): ICD-10-CM

## 2021-03-12 PROCEDURE — 1111F DSCHRG MED/CURRENT MED MERGE: CPT | Performed by: FAMILY MEDICINE

## 2021-03-12 PROCEDURE — G0439 PPPS, SUBSEQ VISIT: HCPCS | Performed by: FAMILY MEDICINE

## 2021-03-12 PROCEDURE — 99214 OFFICE O/P EST MOD 30 MIN: CPT | Performed by: FAMILY MEDICINE

## 2021-03-12 PROCEDURE — G0444 DEPRESSION SCREEN ANNUAL: HCPCS | Performed by: FAMILY MEDICINE

## 2021-03-12 RX ORDER — NYSTATIN 100000 U/G
1 CREAM TOPICAL 3 TIMES DAILY PRN
Qty: 30 G | Refills: 0 | Status: SHIPPED | OUTPATIENT
Start: 2021-03-12 | End: 2021-08-05

## 2021-03-12 NOTE — PATIENT INSTRUCTIONS
Try nystatin cream 3 times per day until rash goes away. Hydrocortisone 1% twice a day. Continue current meds. Watch diet for fats and carbs. Stay active. Return for fasting blood work.     Sahil Winston's SCREENING SCHEDULE   Tests on this list are the Welcome to Medicare Visit    Abdominal aortic aneurysm screening (once between ages 73-68)  No results found for this or any previous visit.  Limited to patients who meet one of the following criteria:   • Men who are 73-68 years old and have smoked mor or any previous visit.  Medium/high risk factors:   End-stage renal disease   Hemophiliacs who received Factor VIII or IX concentrates   Clients of institutions for the mentally retarded   Persons who live in the same house as a HepB virus carrier   Homosex 03/07/2012 5.6    No flowsheet data found.     Fasting Blood Sugar (FSB)   Patient must be diagnosed with one of the following:   • Hypertension   • Dyslipidemia   • Obesity (BMI ³30 kg/m2)   • Previous elevated impaired FBS or GTT   … or any two of the f flowsheet data found. Fecal Occult Blood   Covered Annually No results found for: FOB, OCCULTSTOOL No flowsheet data found.      Barium Enema-   uncomfortable but covered  Covered but uncomfortable   Glaucoma Screening      Ophthalmology Visit   Covered Medically needed    Zoster (Not covered by Medicare Part B) No orders found for this or any previous visit. This may be covered with your pharmacy  prescription benefits     Recommended Websites for Advanced Directives    [email or web address]

## 2021-03-12 NOTE — PROGRESS NOTES
HPI:   Edel Carver is a 65year old male who presents for a Medicare Subsequent Annual Wellness visit (Pt already had Initial Annual Wellness) discussed congestive heart failure, obesity, tension, hypercholesterolemia, MS, A. fib, coronary artery disea Patient is hopeful that his symptoms will get better over time.   Patient was hospitalized for couple of days at [facility].      His last annual assessment has been over 1 year: Annual Physical due on 10/31/2017         Patient Care Team: Patient Care Dyspnea, unspecified type     At risk for falling     Unstable angina (HCC)     Systolic CHF, chronic (HCC)     Benign essential HTN     NSTEMI (non-ST elevated myocardial infarction) ([facility] [address]     Obesity, morbid, BMI 40.0-49.9 (HCC)     Chest pain, atypical Inject 20 mL (600 mg total) into the vein every 6 (six) months. Calcium Carbonate (CALCIUM 600 OR), Take 1 Tab by mouth daily. Multiple Vitamin (MULTI VITAMIN MENS OR), Take 1 Tab by mouth daily.   Ascorbic Acid (VITAMIN C CR) 1000 MG Oral Tab CR, Take 1 withough preoperative order for iv antibiotic surgical site infection prophylaxis.  (7/31/2013); patient documented not to have experienced any of the following events (7/31/2013); injection, w/wo contrast, dx/therapeutic substance, epidural/subarachnoid; l Temp 96.9 °F (36.1 °C) (Oral)   Resp 18   Ht 5' 7\" (1.702 m)   Wt 253 lb (114.8 kg)   SpO2 99%   BMI 39.63 kg/m²   Estimated body mass index is 39.63 kg/m² as calculated from the following:    Height as of this encounter: 5' 7\" (1.702 m).     Weight as of Immunization History   Administered Date(s) Administered   • Pneumococcal (Prevnar 13) 05/20/2015   • TDAP 02/25/2015       ASSESSMENT AND OTHER RELEVANT CHRONIC CONDITIONS:   Breanna Fournier is a 65year old male who presents for a Medicare Assessment. cardiologist Dr. Ngo Fails. He is monitoring patient blood work here. Patient is asymptomatic. Non-ST elevation MI last year. Doing good with this    Congestive heart failure monitored by cardiologist stable.      Morbid obesity -mobility is decreased bec mental well-being?: Social Interaction    If you are a male age 38-65 or a female age 47-67, do you take aspirin?: Yes    Have you had any immunizations at another office such as Influenza, Hepatitis B, Tetanus, or Pneumococcal?: No     Functional Ability for quick review of chart, separate sheet to patient  [address] Internal Lab or Procedure External Lab or Procedure   Diabetes Screening      HbgA1C   Annually HGBA1C (%)   Date Value   03/07/2012 5.6       No chayo External Lab or Procedure   Annual Monitoring of Persistent     Medications (ACE/ARB, digoxin diuretics, anticonvulsants.)    Potassium  Annually Potassium (mmol/L)   Date Value   03/04/2021 4.0   10/09/2020 4.04     POTASSIUM (mmol/L)   Date Value   10/15

## 2021-03-23 ENCOUNTER — TELEPHONE (OUTPATIENT)
Dept: FAMILY MEDICINE CLINIC | Facility: CLINIC | Age: 71
End: 2021-03-23

## 2021-03-23 NOTE — TELEPHONE ENCOUNTER
Requesting a call back for a verbal discharge. 1 visit remaining and anticipating all goals will be met. Please call Tibia @ [address] [phone].

## 2021-03-23 NOTE — TELEPHONE ENCOUNTER
Slade Rodarte with [facility] is asking for a discharge order. I asked how he was doing-she said he is doing great. Has MS and that complicated [id number] but he is walking without desaturating and he is feeling good. Patient is wanting to go to [place] for vacation.

## 2021-04-02 ENCOUNTER — MED REC SCAN ONLY (OUTPATIENT)
Dept: FAMILY MEDICINE CLINIC | Facility: CLINIC | Age: 71
End: 2021-04-02

## 2021-04-08 ENCOUNTER — APPOINTMENT (OUTPATIENT)
Dept: GENERAL RADIOLOGY | Age: 71
End: 2021-04-08
Attending: PHYSICIAN ASSISTANT
Payer: MEDICARE

## 2021-04-08 ENCOUNTER — TELEPHONE (OUTPATIENT)
Dept: FAMILY MEDICINE CLINIC | Facility: CLINIC | Age: 71
End: 2021-04-08

## 2021-04-08 ENCOUNTER — HOSPITAL ENCOUNTER (OUTPATIENT)
Age: 71
Discharge: HOME | End: 2021-04-08
Payer: MEDICARE

## 2021-04-08 VITALS
HEART RATE: 86 BPM | OXYGEN SATURATION: 97 % | SYSTOLIC BLOOD PRESSURE: 127 MMHG | RESPIRATION RATE: 18 BRPM | TEMPERATURE: 99 F | DIASTOLIC BLOOD PRESSURE: 58 MMHG

## 2021-04-08 DIAGNOSIS — R05.9 COUGH: Primary | ICD-10-CM

## 2021-04-08 PROCEDURE — 99214 OFFICE O/P EST MOD 30 MIN: CPT

## 2021-04-08 PROCEDURE — 71046 X-RAY EXAM CHEST 2 VIEWS: CPT | Performed by: PHYSICIAN ASSISTANT

## 2021-04-08 RX ORDER — AMOXICILLIN AND CLAVULANATE POTASSIUM 875; 125 MG/1; MG/1
1 TABLET, FILM COATED ORAL 2 TIMES DAILY
Qty: 20 TABLET | Refills: 0 | Status: SHIPPED | OUTPATIENT
Start: 2021-04-08 | End: 2021-04-18

## 2021-04-08 NOTE — TELEPHONE ENCOUNTER
Patient states that he has had both Covid-19 vaccines--> see immunization record. Patient states that he did have contact to suspected Covid +-->last contact with suspected Covid-19 was yesterday morning--patient was around contact for about 8 days. Contact is symptomatic and + Covid. ONSET: 3/28/21    CURRENTLY:    Cough: in the mornings-->sputum yellow/green to dark green. SOB/Chest tightness/Chest pain: Denies. However patient states that at times he feels like it is difficult to get a full breath upon inspiration--> last episode was > 1 week ago. Headache: Denies    Fever: Denies    Body Ache: Denies    Fatigue: Denies    Nausea/Vomiting/Diarrhea: Denies    Nasal: Nasal congestion mild. Taste/Smell: diminished has been diminished chronically    Throat: Denies    Skin: Denies    Appetite/Hydrating: Appetite--> Normal ; Hydration--> drinking about 72 oz of H2O q day. Mouth moist and normal urination. Supportive MEDICATIONS:  Vit D OTC  Vit C OTC  Vit E OTC  MVI OTC    Patient was advised to go to IC for further evaluation--> patient states he will wither got to [address] [place] [address] or [facility]. I asked that he call to follow-up with us upon discharge. Patient verbalized understanding. Patient is aware of the current CDC guidelines related to quarantine. Patient verbalized understanding, no further questions or concerns at this time. Dr. Maicol Galindo to advise.

## 2021-04-08 NOTE — ED PROVIDER NOTES
Patient Seen in: [facility]      History   Patient presents with:  Cough/URI    Stated Complaint: CONGESTION     HPI/Subjective:   HPI    20-year-old male presents to the urgent care requesting a chest x-ray.   Patient states that he had c APPENDECTOMY  1991   • CABG  1998   • CARDIAC DEFIBRILLATOR PLACEMENT      [employer], not pacer dependent.   Magnet response inhibit therapy   • COLONOSCOPY  1/2016    Dr Patrick Montes due in 5 years   • LUMBAR EPIDURAL N/A 8/14/2013    Performed by Robbi Bowens, negative. Positive for stated complaint: CONGESTION   Other systems are as noted in HPI. Constitutional and vital signs reviewed. All other systems reviewed and negative except as noted above.     Physical Exam     ED Triage Vitals [04/08/21 1356 chest congestion and spitting up \"plugs\". Patient denies a lung history, denies fever. Patient is COVID-19 vaccinated, COVID-19 testing was negative. Lung sounds were mildly coarse, do not hear consolidation. Chest x-ray was ordered and was negative.

## 2021-04-08 NOTE — TELEPHONE ENCOUNTER
1. What are your symptoms?  -Major chest congestion   -Hard to breathe in     2. How long have you been having these symptoms?  -week and a half    3. Have you done anything already to treat your symptoms?   -Will clear up \"after time\"      ADDITIONAL INFO:   Transferred live to triage.

## 2021-08-05 ENCOUNTER — APPOINTMENT (OUTPATIENT)
Dept: ULTRASOUND IMAGING | Facility: HOSPITAL | Age: 71
DRG: 552 | End: 2021-08-05
Attending: EMERGENCY MEDICINE
Payer: MEDICARE

## 2021-08-05 ENCOUNTER — HOSPITAL ENCOUNTER (INPATIENT)
Facility: HOSPITAL | Age: 71
LOS: 5 days | Discharge: NURSING, REHAB OR LONG-TERM CARE | DRG: 552 | End: 2021-08-10
Attending: EMERGENCY MEDICINE | Admitting: HOSPITALIST
Payer: MEDICARE

## 2021-08-05 ENCOUNTER — APPOINTMENT (OUTPATIENT)
Dept: GENERAL RADIOLOGY | Facility: HOSPITAL | Age: 71
DRG: 552 | End: 2021-08-05
Attending: EMERGENCY MEDICINE
Payer: MEDICARE

## 2021-08-05 DIAGNOSIS — M54.9 INTRACTABLE BACK PAIN: Primary | ICD-10-CM

## 2021-08-05 LAB — SARS-COV-2 RNA RESP QL NAA+PROBE: NOT DETECTED

## 2021-08-05 PROCEDURE — 93971 EXTREMITY STUDY: CPT | Performed by: EMERGENCY MEDICINE

## 2021-08-05 PROCEDURE — 5A09357 ASSISTANCE WITH RESPIRATORY VENTILATION, LESS THAN 24 CONSECUTIVE HOURS, CONTINUOUS POSITIVE AIRWAY PRESSURE: ICD-10-PCS | Performed by: HOSPITALIST

## 2021-08-05 PROCEDURE — 73502 X-RAY EXAM HIP UNI 2-3 VIEWS: CPT | Performed by: EMERGENCY MEDICINE

## 2021-08-05 PROCEDURE — 99223 1ST HOSP IP/OBS HIGH 75: CPT | Performed by: HOSPITALIST

## 2021-08-05 RX ORDER — HYDROMORPHONE HYDROCHLORIDE 1 MG/ML
0.5 INJECTION, SOLUTION INTRAMUSCULAR; INTRAVENOUS; SUBCUTANEOUS EVERY 30 MIN PRN
Status: DISCONTINUED | OUTPATIENT
Start: 2021-08-05 | End: 2021-08-05

## 2021-08-05 RX ORDER — HYDROMORPHONE HYDROCHLORIDE 1 MG/ML
0.8 INJECTION, SOLUTION INTRAMUSCULAR; INTRAVENOUS; SUBCUTANEOUS EVERY 2 HOUR PRN
Status: DISCONTINUED | OUTPATIENT
Start: 2021-08-05 | End: 2021-08-10

## 2021-08-05 RX ORDER — MULTIVITAMIN WITH FOLIC ACID 400 MCG
1 TABLET ORAL DAILY
COMMUNITY

## 2021-08-05 RX ORDER — HYDROMORPHONE HYDROCHLORIDE 1 MG/ML
0.4 INJECTION, SOLUTION INTRAMUSCULAR; INTRAVENOUS; SUBCUTANEOUS EVERY 2 HOUR PRN
Status: DISCONTINUED | OUTPATIENT
Start: 2021-08-05 | End: 2021-08-10

## 2021-08-05 RX ORDER — ONDANSETRON 2 MG/ML
4 INJECTION INTRAMUSCULAR; INTRAVENOUS EVERY 4 HOURS PRN
Status: ACTIVE | OUTPATIENT
Start: 2021-08-05 | End: 2021-08-05

## 2021-08-05 RX ORDER — LOSARTAN POTASSIUM 25 MG/1
12.5 TABLET ORAL DAILY
Status: DISCONTINUED | OUTPATIENT
Start: 2021-08-05 | End: 2021-08-10

## 2021-08-05 RX ORDER — HYDROCODONE BITARTRATE AND ACETAMINOPHEN 5; 325 MG/1; MG/1
2 TABLET ORAL EVERY 4 HOURS PRN
Status: DISCONTINUED | OUTPATIENT
Start: 2021-08-05 | End: 2021-08-10

## 2021-08-05 RX ORDER — ATORVASTATIN CALCIUM 40 MG/1
40 TABLET, FILM COATED ORAL NIGHTLY
Status: DISCONTINUED | OUTPATIENT
Start: 2021-08-05 | End: 2021-08-10

## 2021-08-05 RX ORDER — ACETAMINOPHEN 325 MG/1
650 TABLET ORAL EVERY 4 HOURS PRN
Status: DISCONTINUED | OUTPATIENT
Start: 2021-08-05 | End: 2021-08-10

## 2021-08-05 RX ORDER — METOCLOPRAMIDE HYDROCHLORIDE 5 MG/ML
5 INJECTION INTRAMUSCULAR; INTRAVENOUS EVERY 8 HOURS PRN
Status: DISCONTINUED | OUTPATIENT
Start: 2021-08-05 | End: 2021-08-10

## 2021-08-05 RX ORDER — LOSARTAN POTASSIUM 25 MG/1
12.5 TABLET ORAL DAILY
COMMUNITY

## 2021-08-05 RX ORDER — FUROSEMIDE 20 MG/1
20 TABLET ORAL DAILY
Status: DISCONTINUED | OUTPATIENT
Start: 2021-08-05 | End: 2021-08-10

## 2021-08-05 RX ORDER — MELATONIN
3 NIGHTLY PRN
Status: DISCONTINUED | OUTPATIENT
Start: 2021-08-05 | End: 2021-08-10

## 2021-08-05 RX ORDER — ONDANSETRON 2 MG/ML
4 INJECTION INTRAMUSCULAR; INTRAVENOUS EVERY 6 HOURS PRN
Status: DISCONTINUED | OUTPATIENT
Start: 2021-08-05 | End: 2021-08-10

## 2021-08-05 RX ORDER — TIZANIDINE 2 MG/1
2 TABLET ORAL EVERY 6 HOURS PRN
Status: DISCONTINUED | OUTPATIENT
Start: 2021-08-05 | End: 2021-08-10

## 2021-08-05 RX ORDER — AMIODARONE HYDROCHLORIDE 200 MG/1
200 TABLET ORAL DAILY
Status: DISCONTINUED | OUTPATIENT
Start: 2021-08-05 | End: 2021-08-10

## 2021-08-05 RX ORDER — ACETAMINOPHEN 325 MG/1
650 TABLET ORAL EVERY 6 HOURS PRN
Status: DISCONTINUED | OUTPATIENT
Start: 2021-08-05 | End: 2021-08-10

## 2021-08-05 RX ORDER — CLOPIDOGREL BISULFATE 75 MG/1
75 TABLET ORAL DAILY
COMMUNITY
End: 2021-09-15

## 2021-08-05 RX ORDER — ATORVASTATIN CALCIUM 40 MG/1
40 TABLET, FILM COATED ORAL NIGHTLY
COMMUNITY

## 2021-08-05 RX ORDER — HYDROCODONE BITARTRATE AND ACETAMINOPHEN 5; 325 MG/1; MG/1
1 TABLET ORAL EVERY 4 HOURS PRN
Status: DISCONTINUED | OUTPATIENT
Start: 2021-08-05 | End: 2021-08-10

## 2021-08-05 RX ORDER — HYDROMORPHONE HYDROCHLORIDE 1 MG/ML
0.5 INJECTION, SOLUTION INTRAMUSCULAR; INTRAVENOUS; SUBCUTANEOUS EVERY 30 MIN PRN
Status: DISCONTINUED | OUTPATIENT
Start: 2021-08-05 | End: 2021-08-10

## 2021-08-05 RX ORDER — POTASSIUM CHLORIDE 750 MG/1
10 TABLET, EXTENDED RELEASE ORAL DAILY
COMMUNITY
End: 2021-09-20

## 2021-08-05 NOTE — ED INITIAL ASSESSMENT (HPI)
Patient presents with L hip pain. Patient was trying to get on the toilet this morning and was unable due to the pain. Denies trauma. Patient was recently diagnosed with spinal stenosis.

## 2021-08-05 NOTE — ED INITIAL ASSESSMENT (HPI)
Pt c/o hip pain, diff getting off toilet, hx heart surgery, pt given fentanyl pta still has pain , pt aox4, walks with a walker at home but unable to now, pt here for swollen legs as well

## 2021-08-05 NOTE — ED PROVIDER NOTES
Patient Seen in: [facility] Emergency Department      History   Patient presents with:  Leg or Foot Injury    Stated Complaint: hip pain    HPI/Subjective:   HPI    68-year-old white male presents emerged from today for complaint of back pain and ri without mention of complication    • High blood pressure    • High cholesterol    • Hyperlipidemia    • Hypertension    • Long term (current) use of anticoagulants    • Multiple sclerosis (HCC)    • Neuropathy    • Obesity, unspecified    • Other specified SUBSTANCE, EPIDURAL/SUBARACHNOID; LUMBAR/SACRAL  8/14/2013    Procedure: LUMBAR EPIDURAL;  Surgeon: Nayeli Hale MD;  Location: [facility] FOR PAIN MANAG[facility]   • OPEN HEART SURGERY (PBP)      1998 4CABG X 4   • OTHER SURGICAL HISTORY  2000    Fiberopti Use: Never used    Alcohol use: Yes      Alcohol/week: 2.0 standard drinks      Types: 2 Standard drinks or equivalent per week      Comment: social beers    Drug use:  No             Review of Systems    Positive for stated complaint: hip pain  Other syste rashes noted on skin exam.      ED Course     Labs Reviewed   RAPID SARS-COV-2 BY PCR - Normal   RAINBOW DRAW LAVENDER   RAINBOW DRAW LIGHT GREEN   RAINBOW DRAW GOLD          Right hip x-ray revealed:    FINDINGS:     BONES:  There are total bilateral hip work-up. Patient cannot get an MRI here because he has a pacemaker defibrillator in place.   Admission disposition: 8/5/2021  4:20 PM                                  Disposition and Plan     Clinical Impression:  Intractable back pain  (primary encounter

## 2021-08-05 NOTE — PLAN OF CARE
Patient rec'd from ER with c/o right hip pain that radiates down right leg. He is able to roll on side but in not able to walk due to pain. Rating pain 2 out of 10 lying still but pain is 10 with movement. Has bilateral lower extremity pitting of +2.  B/P e

## 2021-08-06 ENCOUNTER — APPOINTMENT (OUTPATIENT)
Dept: CT IMAGING | Facility: HOSPITAL | Age: 71
DRG: 552 | End: 2021-08-06
Attending: HOSPITALIST
Payer: MEDICARE

## 2021-08-06 LAB
ALBUMIN SERPL-MCNC: 3.2 G/DL (ref 3.4–5)
ALBUMIN/GLOB SERPL: 1.2 {RATIO} (ref 1–2)
ALP LIVER SERPL-CCNC: 75 U/L
ALT SERPL-CCNC: 50 U/L
ANION GAP SERPL CALC-SCNC: 6 MMOL/L (ref 0–18)
AST SERPL-CCNC: 61 U/L (ref 15–37)
BASOPHILS # BLD AUTO: 0.05 X10(3) UL (ref 0–0.2)
BASOPHILS NFR BLD AUTO: 0.6 %
BILIRUB SERPL-MCNC: 0.8 MG/DL (ref 0.1–2)
BUN BLD-MCNC: 10 MG/DL (ref 7–18)
CALCIUM BLD-MCNC: 8.4 MG/DL (ref 8.5–10.1)
CHLORIDE SERPL-SCNC: 111 MMOL/L (ref 98–112)
CO2 SERPL-SCNC: 25 MMOL/L (ref 21–32)
CREAT BLD-MCNC: 0.82 MG/DL
EOSINOPHIL # BLD AUTO: 0.41 X10(3) UL (ref 0–0.7)
EOSINOPHIL NFR BLD AUTO: 5 %
ERYTHROCYTE [DISTWIDTH] IN BLOOD BY AUTOMATED COUNT: 13.6 %
GLOBULIN PLAS-MCNC: 2.7 G/DL (ref 2.8–4.4)
GLUCOSE BLD-MCNC: 83 MG/DL (ref 70–99)
HAV IGM SER QL: 2.3 MG/DL (ref 1.6–2.6)
HCT VFR BLD AUTO: 43.9 %
HGB BLD-MCNC: 13.9 G/DL
IMM GRANULOCYTES # BLD AUTO: 0.04 X10(3) UL (ref 0–1)
IMM GRANULOCYTES NFR BLD: 0.5 %
LYMPHOCYTES # BLD AUTO: 2.01 X10(3) UL (ref 1–4)
LYMPHOCYTES NFR BLD AUTO: 24.7 %
M PROTEIN MFR SERPL ELPH: 5.9 G/DL (ref 6.4–8.2)
MCH RBC QN AUTO: 30.8 PG (ref 26–34)
MCHC RBC AUTO-ENTMCNC: 31.7 G/DL (ref 31–37)
MCV RBC AUTO: 97.3 FL
MONOCYTES # BLD AUTO: 1.04 X10(3) UL (ref 0.1–1)
MONOCYTES NFR BLD AUTO: 12.8 %
NEUTROPHILS # BLD AUTO: 4.58 X10 (3) UL (ref 1.5–7.7)
NEUTROPHILS # BLD AUTO: 4.58 X10(3) UL (ref 1.5–7.7)
NEUTROPHILS NFR BLD AUTO: 56.4 %
OSMOLALITY SERPL CALC.SUM OF ELEC: 292 MOSM/KG (ref 275–295)
PLATELET # BLD AUTO: 195 10(3)UL (ref 150–450)
POTASSIUM SERPL-SCNC: 3.5 MMOL/L (ref 3.5–5.1)
POTASSIUM SERPL-SCNC: 4.9 MMOL/L (ref 3.5–5.1)
RBC # BLD AUTO: 4.51 X10(6)UL
SODIUM SERPL-SCNC: 142 MMOL/L (ref 136–145)
WBC # BLD AUTO: 8.1 X10(3) UL (ref 4–11)

## 2021-08-06 PROCEDURE — 72131 CT LUMBAR SPINE W/O DYE: CPT | Performed by: HOSPITALIST

## 2021-08-06 PROCEDURE — 99232 SBSQ HOSP IP/OBS MODERATE 35: CPT | Performed by: HOSPITALIST

## 2021-08-06 RX ORDER — DEXAMETHASONE SODIUM PHOSPHATE 4 MG/ML
6 VIAL (ML) INJECTION ONCE
Status: COMPLETED | OUTPATIENT
Start: 2021-08-06 | End: 2021-08-06

## 2021-08-06 RX ORDER — POTASSIUM CHLORIDE 20 MEQ/1
40 TABLET, EXTENDED RELEASE ORAL EVERY 4 HOURS
Status: COMPLETED | OUTPATIENT
Start: 2021-08-06 | End: 2021-08-06

## 2021-08-06 RX ORDER — HEPARIN SODIUM 5000 [USP'U]/ML
5000 INJECTION, SOLUTION INTRAVENOUS; SUBCUTANEOUS EVERY 12 HOURS SCHEDULED
Status: DISCONTINUED | OUTPATIENT
Start: 2021-08-06 | End: 2021-08-09

## 2021-08-06 RX ORDER — KETOROLAC TROMETHAMINE 15 MG/ML
15 INJECTION, SOLUTION INTRAMUSCULAR; INTRAVENOUS EVERY 6 HOURS
Status: COMPLETED | OUTPATIENT
Start: 2021-08-06 | End: 2021-08-08

## 2021-08-06 NOTE — OCCUPATIONAL THERAPY NOTE
OCCUPATIONAL THERAPY EVALUATION - INPATIENT     Room Number: 382/382-A  Evaluation Date: 8/6/2021  Type of Evaluation: Initial  Presenting Problem: R hip pain    Physician Order: IP Consult to Occupational Therapy  Reason for Therapy: ADL/IADL Dysfunction Unspecified urinary incontinence    • Ventricular arrhythmia    • Visual impairment        Past Surgical History  Past Surgical History:   Procedure Laterality Date   • APPENDECTOMY  1991   • CABG  1998   • CARDIAC DEFIBRILLATOR PLACEMENT      [facility] [place] FOR PAIN MANAGEMENT   • PATIENT DOCUMENTED NOT TO HAVE EXPERIENCED ANY OF THE FOLLOWING EVENTS  8/14/2013    Procedure: LUMBAR EPIDURAL;  Surgeon: Niesha Caballero MD;  Location: [facility] FOR PAIN MANAGEMENT   • PATIENT [address] to MS, none noted this session)    SENSATION  Light touch:  intact  Sharp/Dull:  impaired    Communication: WFL    Behavioral/Emotional/Social: WFL    RANGE OF MOTION AND STRENGTH ASSESSMENT  Upper extremity ROM is within functional limits (reports RUE leeroy cues; sitting via min assist to control descent with cues for safe hand placement. Patient also educated on OT role, safety, fall prevention, pain management, discharge recommendations with good verbal understanding.    Patient End of Session: Up in chair;N ADL/IADL LOW  1 - 3 performance deficits    Client Assessment/Performance Deficits LOW - No comorbidities nor modifications of tasks    Clinical Decision Making LOW - Analysis of occupational profile, problem-focused assessments, limited treatment options

## 2021-08-06 NOTE — PLAN OF CARE
Patient alert and oriented x4. Vital signs stable; tele per DON protocol; A-V paced. BLE pitting edema. Pain to rt hip radiating down leg, PRN pain medication given. Voiding via male external catheter due to pain with movement.      Patient reminded to use

## 2021-08-06 NOTE — CM/SW NOTE
08/06/21 1500   CM/SW Referral Data   Referral Source Social Work (self-referral)   Reason for Referral Discharge planning   Informant Patient;EMR   Patient Info   Patient's Current Mental Status at Time of Assessment Alert;Oriented   Discharge Needs Planner  [phone]

## 2021-08-06 NOTE — PROGRESS NOTES
[facility] HOSPITALIST  Progress Note     Margaret Middleton Patient Status:  Inpatient    10/16/1950 MRN [id number]   [facility] 3SW-A Attending Carolee Montes MD   Hosp Day # 1 PCP Robert Cardoza MD     Chief Complaint: leg pain  Subjective: hours.    Creatinine Kinase  No results for input(s): CK in the last 168 hours. Inflammatory Markers  No results for input(s): CRP, KARI, LDH, DDIMER in the last 168 hours. Imaging: Imaging data reviewed in Epic.     Medications:   • potassium chloride

## 2021-08-06 NOTE — PROGRESS NOTES
Dr Vincent Shannon will see patient on Monday 8/9 to evaluate for possible LESI if patient remains inpatient.    Chronic Pain Consult re-timed for Monday am  Johanna Scott RN

## 2021-08-06 NOTE — PLAN OF CARE
Plan of care discussed with patient this am. Pain service consult placed this am as ordered. Patient down for CT scan as ordered this am. Norco and steroid given as ordered for mod level of pain to back. States relief post admin.  Safety precautions discuss

## 2021-08-06 NOTE — PHYSICAL THERAPY NOTE
PHYSICAL THERAPY EVALUATION - INPATIENT     Room Number: 382/382-A  Evaluation Date: 8/6/2021  Type of Evaluation: Initial  Physician Order: PT Eval and Treat    Presenting Problem: right hip radiating pain  Reason for Therapy: Mobility Dysfunction a Unspecified urinary incontinence    • Ventricular arrhythmia    • Visual impairment        Past Surgical History  Past Surgical History:   Procedure Laterality Date   • APPENDECTOMY  1991   • CABG  1998   • CARDIAC DEFIBRILLATOR PLACEMENT      [facility] [place] FOR PAIN MANAGEMENT   • PATIENT DOCUMENTED NOT TO HAVE EXPERIENCED ANY OF THE FOLLOWING EVENTS  8/14/2013    Procedure: LUMBAR EPIDURAL;  Surgeon: Niesha Caballero MD;  Location: [facility] FOR PAIN MANAGEMENT   • PATIENT [address] functional limits     Lower extremity ROM is within functional limits except right knee and hip due to right hip pain    Lower extremity strength is within functional limits except for the following:    Right Hip flexion  3-/5  Left Hip flexion  3+/5  Righ to complete supine to sit transfer with min a, good sitting balance with no dizziness. Pt educated in rw use, stood to rw with min a, gait training with rw with emphasis on upright posture, safe rw use.   Pt with baseline scoliosis which is much more appar inpatient PT to address the above deficits to assist patient in returning to prior to level of function. Based on this evaluation, patient's clinical presentation is evolving and overall the evaluation complexity is considered moderate.     DISCHARGE JULIA

## 2021-08-06 NOTE — H&P
MERT HOSPITALIST  History and Physical     Mariluz Hsu Patient Status:  Inpatient    10/16/1950 MRN [id number]   [facility] 3SW-A Attending Nuria May MD   Hosp Day # 0 PCP Marlene Kaye MD     Chief Complaint: back pain • Unspecified sleep apnea     wears CPAP   • Unspecified urinary incontinence    • Ventricular arrhythmia    • Visual impairment         Past Surgical History:   Past Surgical History:   Procedure Laterality Date   • APPENDECTOMY  1991   • CABG  1998   • LUMBAR;  Surgeon: Arjun Kirkland MD;  Location: [facility] FOR PAIN MANAGEMENT   • PATIENT DOCUMENTED NOT TO HAVE EXPERIENCED ANY OF THE FOLLOWING EVENTS  8/14/2013    Procedure: LUMBAR EPIDURAL;  Surgeon: Arjun Kirkland MD;  Location: [address] [place] [address] mouth 2 (two) times daily. , Disp: , Rfl:   Multiple Vitamins-Minerals (TAB-A-STEPHEN) Oral Tab, Take 1 tablet by mouth daily. , Disp: , Rfl:   apixaban 5 MG Oral Tab, Take 1 tablet (5 mg total) by mouth 2 (two) times daily. , Disp: 60 tablet, Rfl: 5  furosemide Radicular pain down right leg  Musculoskeletal: Moves all extremities. Extremities: No edema or cyanosis. Integument: No rashes or lesions. Psychiatric: Appropriate mood and affect.       Diagnostic Data:      Labs:  No results for input(s): WBC, HGB, M

## 2021-08-07 PROCEDURE — 99232 SBSQ HOSP IP/OBS MODERATE 35: CPT | Performed by: HOSPITALIST

## 2021-08-07 RX ORDER — DEXAMETHASONE SODIUM PHOSPHATE 4 MG/ML
6 VIAL (ML) INJECTION ONCE
Status: COMPLETED | OUTPATIENT
Start: 2021-08-07 | End: 2021-08-07

## 2021-08-07 RX ORDER — METHYLPREDNISOLONE 4 MG/1
TABLET ORAL
Qty: 21 TABLET | Refills: 0 | Status: SHIPPED | OUTPATIENT
Start: 2021-08-07 | End: 2021-09-10 | Stop reason: ALTCHOICE

## 2021-08-07 NOTE — PROGRESS NOTES
MERT HOSPITALIST  Progress Note     Surinder Smith Patient Status:  Inpatient    10/16/1950 MRN [id number]   [facility] 3SW-A Attending Ronit Thomas MD   Hosp Day # 2 PCP Ze Mcnulty MD     Chief Complaint: leg pain  Subjective: for input(s): PCT in the last 168 hours. Cardiac  No results for input(s): TROP, PBNP in the last 168 hours. Creatinine Kinase  No results for input(s): CK in the last 168 hours.     Inflammatory Markers  No results for input(s): CRP, KARI, LDH, DDIMER

## 2021-08-07 NOTE — PROGRESS NOTES
Rn paged [employer] with SW to notify of PMR consult and their recommendations, and that patient may be able to discharge later today if pain level remains low. Awaiting call back.

## 2021-08-07 NOTE — CONSULTS
Moiz Morrisph  male  [id number]  382/382-A  8/5/2021  Louie Reyes MD  10/16/1950  65year old      Physical medicine and rehabilitation remote consult    History of Present Illness:      This is a 65year old male with past medical history significant fo diseases of circulatory system    • Pre-operative cardiovascular examination    • Pulmonary vascular congestion     Bilateral anterior thigh-area pain.    • Pure hypercholesterolemia 6/29/2012   • Unspecified sleep apnea     wears CPAP   • Unspecified urina Location: [facility]   • PATIENT DOCUMENTED NOT TO HAVE EXPERIENCED ANY OF THE FOLLOWING EVENTS  7/31/2013    Procedure: TRANSFORAMINAL EPIDURAL - LUMBAR;  Surgeon: Venus Barraza MD;  [facility], ambulating with rollator on main level, rw on second level, amb up stairs with cane and railing and down backwards with railing. Pt lives with supportive spouse.        @Meds@  No Known Allergies        Principal Problem:    Intractable back pain  Act

## 2021-08-07 NOTE — PLAN OF CARE
Plan of care discussed with patient this am. Patient was a little confused this am when first waking up, then re oriented well. Will monitor. Denies vision changes. No weakness.  Able to answer Rn questions appropriately this am. Hospitalist at bedside this

## 2021-08-07 NOTE — PLAN OF CARE
PT alert and oriented. VSS on RA during day. CPAP at night. . IS. Tele. Voiding freely. No complaints of pain currently, taking norco prn, scheduled toradol. On heparin subcutaneous, holding eliquis and plavix. Plan for possible TK.  Pain service to see

## 2021-08-08 PROCEDURE — 99232 SBSQ HOSP IP/OBS MODERATE 35: CPT | Performed by: HOSPITALIST

## 2021-08-08 RX ORDER — DEXAMETHASONE SODIUM PHOSPHATE 4 MG/ML
4 VIAL (ML) INJECTION ONCE
Status: COMPLETED | OUTPATIENT
Start: 2021-08-08 | End: 2021-08-08

## 2021-08-08 NOTE — PROGRESS NOTES
MERT HOSPITALIST  Progress Note     [facility] Patient Status:  Inpatient    10/16/1950 MRN [id number]   [facility] 3SW-A Attending Lucero Burnette MD   Hosp Day # 3 PCP Ramu Kaplan MD     Chief Complaint: leg pain  Subjective: last 168 hours. Cardiac  No results for input(s): TROP, PBNP in the last 168 hours. Creatinine Kinase  No results for input(s): CK in the last 168 hours. Inflammatory Markers  No results for input(s): CRP, KAIR, LDH, DDIMER in the last 168 hours.

## 2021-08-08 NOTE — CM/SW NOTE
SW spoke to pt who states that he would prefer to go home with home health but also understands the benefit of SNF. He requested a Home Health referral be placed to [facility] (he has used in the past).  He states that he prefers [facility] but un

## 2021-08-08 NOTE — PLAN OF CARE
Pt ambulates with SBA, able to climb stairs with therapy this afternoon. Denies need for pain med. Pt and family decided on [facility] for dc. Will speak to SW tomorrow for choices. Will continue to monitor.

## 2021-08-08 NOTE — PHYSICAL THERAPY NOTE
PHYSICAL THERAPY TREATMENT NOTE - INPATIENT    Room Number: 382/382-A     Session: 1   Number of Visits to Meet Established Goals: 5    Presenting Problem: right hip radiating pain    History related to current admission: Pt admitted from home 8/5/21 due Surgical History  Past Surgical History:   Procedure Laterality Date   • APPENDECTOMY  1991   • CABG  1998   • CARDIAC DEFIBRILLATOR PLACEMENT      [employer], not pacer dependent.   Magnet response inhibit therapy   • COLONOSCOPY  1/2016    Dr Nilam Toribio EVENTS  8/14/2013    Procedure: LUMBAR EPIDURAL;  Surgeon: Luz Jiménez MD;  Location: [facility] FOR PAIN MANAGEMENT   • PATIENT [address] FOR IV ANTIBIOTIC SURGICAL SITE INFECTION PROPHYLAXIS.  7/12/2013    Procedure: TRANSFORAMINAL help from another person does the patient currently need. ..   -   Moving to and from a bed to a chair (including a wheelchair)?: A Little   -   Need to walk in hospital room?: A Little   -   Climbing 3-5 steps with a railing?: A Little       AM-PAC Score: Recommendations: Home with home health PT     PLAN  PT Treatment Plan: Bed mobility; Patient education;Gait training;Range of motion;Strengthening;Transfer training;Stair training  Rehab Potential : Good  Frequency (Obs): 3-5x/week    CURRENT GOALS   Goal #

## 2021-08-08 NOTE — HOME CARE LIAISON
Received referral from [address] [place] [address] who reserved [facility] in [facility]. Pt open to using [facility] if SNF does not work out for him, will place referral. Updated RN as well. Contact information provided at bedside. Financial interest disclosure provided to patient.

## 2021-08-08 NOTE — PLAN OF CARE
PT alert and oriented. VSS on RA, wears CPAP at night. , IS. Minimal complaints of pain, taking tylenol with good relief. On scheduled toradol. Continuing to hold plavix  and eliquis. Possibly will have TK done, pain service to evaluate Monday.  Safety

## 2021-08-08 NOTE — CM/SW NOTE
SW spoke to pt's daughter Anita Maldonado (ph: [phone]). She would like more information on how to be a POA for her father. SW placed an order for  services to assist with an POA.      Fly Boo MSW, LCSW   at [facility]

## 2021-08-08 NOTE — DISCHARGE SUMMARY
[facility] HOSPITALIST  DISCHARGE SUMMARY     David Nguyễn Patient Status:  Inpatient    10/16/1950 MRN [id number]   [facility] 3SW-A Attending Jacob Bowen MD   Hosp Day # 5 PCP Reinier Bo MD     Date of Admission: 2021  Date o Tbpk  Commonly known as: MEDROL DOSEPACK  Notes to patient: steroid      Take as directed on dose pack instructions   Quantity: 21 tablet  Refills: 0        CONTINUE taking these medications      Instructions Prescription details   amiodarone 200 MG Tabs medications  · methylPREDNISolone 4 MG Tbpk  · metoprolol tartrate 25 MG Tabs         [place]    Follow-up appointment:   Awanda Brunner, Victor Hugo Kasia . [facility] [address]    In 1 week      Abrahan Loja MD  120

## 2021-08-09 PROCEDURE — 99232 SBSQ HOSP IP/OBS MODERATE 35: CPT | Performed by: HOSPITALIST

## 2021-08-09 RX ORDER — CLOPIDOGREL BISULFATE 75 MG/1
75 TABLET ORAL DAILY
Status: DISCONTINUED | OUTPATIENT
Start: 2021-08-09 | End: 2021-08-10

## 2021-08-09 NOTE — PROGRESS NOTES
08/08/21 1919   Clinical Encounter Type   Visited With Patient and family together   Routine Visit Introduction  (AD requested and completed)   Continue Visiting No   Patient's Supportive Strategies/Resources  shared prayer with the pt and his w

## 2021-08-09 NOTE — CM/SW NOTE
MSW met with the patient and wife at bedside. Both feel much more comfortable going to [facility] for a few days prior to dc home. They chose [facility] - phone # [phone] . MSW reserved in [address] [place] [address]. Anticipated dc today.   SW will arrange

## 2021-08-09 NOTE — OCCUPATIONAL THERAPY NOTE
OCCUPATIONAL THERAPY TREATMENT NOTE - INPATIENT     Room Number: 382/382-A  Session: 1   Number of Visits to Meet Established Goals: 5    Presenting Problem: R hip pain    History related to current admission: Patient is 65year old male admitted on 8/5/20 History  Past Surgical History:   Procedure Laterality Date   • APPENDECTOMY  1991   • CABG  1998   • CARDIAC DEFIBRILLATOR PLACEMENT      [employer], not pacer dependent.   Magnet response inhibit therapy   • COLONOSCOPY  1/2016    Dr Federico Ulloa due in 5 8/14/2013    Procedure: LUMBAR EPIDURAL;  Surgeon: Cameron Gomez MD;  Location: [facility] FOR PAIN MANAGEMENT   • PATIENT [address] FOR IV ANTIBIOTIC SURGICAL SITE INFECTION PROPHYLAXIS.  7/12/2013    Procedure: TRANSFORAMINAL EPIDURAL ASSESSMENT  Supine to Sit : Not tested  Sit to Stand: Contact guard assist    Skilled Therapy Provided:   UB dressing: SUPV  LB dressing: SUPV  Toileting: SUPV  Grooming: CGA standing at sink     Functional mobility: CGA with RW  Supine to sit: NT  Sit to supervision  Patient will transfer sit to supine with supervision  Patient will transfer to Toilet with supervision     ADDITIONAL GOALS  Patient will recall all spinal precautions for comfort and incorporate into ADLs

## 2021-08-09 NOTE — PROGRESS NOTES
MERT HOSPITALIST  Progress Note     Fausto Fontana Patient Status:  Inpatient    10/16/1950 MRN [id number]   [facility] 3SW-A Attending Kai Cardenas MD   Hosp Day # 4 PCP Tommy Galvan MD     Chief Complaint: leg pain  Subjective: Imaging data reviewed in Epic.     Medications:   • Heparin Sodium (Porcine)  5,000 Units Subcutaneous 2 times per day   • amiodarone  200 mg Oral Daily   • furosemide  20 mg Oral Daily   • Losartan Potassium  12.5 mg Oral Daily   • atorvastatin  40 mg Oral

## 2021-08-09 NOTE — PLAN OF CARE
Patient alert and oriented ,denies any pain when resting ,vital signs stable ,denies any chest pain or short of breath . Using cpap at night ,voids per urinal . Gets up with min assist with walker .  All safety precautions in place , patient and family want

## 2021-08-09 NOTE — CM/SW NOTE
[facility] notified MSW that they do not have a bed today. MSW notified RN. This was the only accepting [facility]. Anticipated dc tomorrow.     Nikki Wallace LCSW

## 2021-08-09 NOTE — PLAN OF CARE
A&O x 4, denies back pain at this time, was 1/10 earlier but declined pain meds, TK cancelled, dc planning today for the [facility] but bed unavailable so dc will be in am, family updated - will take him by car in am when bed available.     2010- Clarified w/

## 2021-08-10 VITALS
BODY MASS INDEX: 40 KG/M2 | OXYGEN SATURATION: 96 % | RESPIRATION RATE: 18 BRPM | TEMPERATURE: 98 F | SYSTOLIC BLOOD PRESSURE: 121 MMHG | DIASTOLIC BLOOD PRESSURE: 74 MMHG | HEART RATE: 59 BPM | WEIGHT: 253.06 LBS

## 2021-08-10 PROCEDURE — 99238 HOSP IP/OBS DSCHRG MGMT 30/<: CPT | Performed by: HOSPITALIST

## 2021-08-10 NOTE — CM/SW NOTE
Spoke with pt's RN who stated pt ready for discharge today. Pt's wife able to provide transport. [facility] and confirmed bed available for admission today. Spoke with pt's wife who stated she can provide transport for DC at 11am today.   Updated

## 2021-08-10 NOTE — PLAN OF CARE
Patient alert and oriented x4 ,has only mild pain  denies pain meds . Vital signs stable ,using cpap at night . Up walking with standby assist ,resumed plavix and eliquis tonight . safety precautions ion place . Reminded to \"call don't fall\" .  Discharge t

## 2021-08-10 NOTE — PLAN OF CARE
[facility]. Spoke with wife who will  pt at 1130 and transport pt. Walking with SBA. Minimal pain. Written and verbal dc instructions given to pt and spouse. Verbalized understanding.   Left hospital at 1140 as per

## 2021-08-11 ENCOUNTER — NURSE ONLY (OUTPATIENT)
Dept: LAB | Age: 71
End: 2021-08-11
Attending: FAMILY MEDICINE
Payer: MEDICARE

## 2021-08-11 ENCOUNTER — INITIAL APN SNF VISIT (OUTPATIENT)
Dept: INTERNAL MEDICINE CLINIC | Age: 71
End: 2021-08-11

## 2021-08-11 VITALS
WEIGHT: 255.19 LBS | RESPIRATION RATE: 18 BRPM | HEART RATE: 79 BPM | SYSTOLIC BLOOD PRESSURE: 129 MMHG | DIASTOLIC BLOOD PRESSURE: 77 MMHG | OXYGEN SATURATION: 98 % | BODY MASS INDEX: 40 KG/M2 | TEMPERATURE: 97 F

## 2021-08-11 DIAGNOSIS — M48.061 SPINAL STENOSIS OF LUMBAR REGION WITHOUT NEUROGENIC CLAUDICATION: ICD-10-CM

## 2021-08-11 DIAGNOSIS — R53.1 WEAKNESS GENERALIZED: Primary | ICD-10-CM

## 2021-08-11 DIAGNOSIS — Z95.810 CARDIAC DEFIBRILLATOR IN PLACE: ICD-10-CM

## 2021-08-11 DIAGNOSIS — Z95.1 S/P CABG (CORONARY ARTERY BYPASS GRAFT): ICD-10-CM

## 2021-08-11 DIAGNOSIS — M54.41 ACUTE RIGHT-SIDED LOW BACK PAIN WITH RIGHT-SIDED SCIATICA: ICD-10-CM

## 2021-08-11 DIAGNOSIS — I25.10 CORONARY ATHEROSCLEROSIS OF NATIVE CORONARY ARTERY: ICD-10-CM

## 2021-08-11 DIAGNOSIS — I48.20 CHRONIC ATRIAL FIBRILLATION (HCC): Primary | ICD-10-CM

## 2021-08-11 DIAGNOSIS — I50.22 SYSTOLIC CHF, CHRONIC (HCC): ICD-10-CM

## 2021-08-11 DIAGNOSIS — I48.20 ATRIAL FIBRILLATION, CHRONIC (HCC): ICD-10-CM

## 2021-08-11 DIAGNOSIS — I10 BENIGN ESSENTIAL HTN: ICD-10-CM

## 2021-08-11 LAB
ALBUMIN SERPL-MCNC: 3 G/DL (ref 3.4–5)
ALBUMIN/GLOB SERPL: 0.8 {RATIO} (ref 1–2)
ALP LIVER SERPL-CCNC: 89 U/L
ALT SERPL-CCNC: 102 U/L
ANION GAP SERPL CALC-SCNC: 7 MMOL/L (ref 0–18)
AST SERPL-CCNC: 121 U/L (ref 15–37)
BILIRUB SERPL-MCNC: 0.8 MG/DL (ref 0.1–2)
BUN BLD-MCNC: 12 MG/DL (ref 7–18)
CALCIUM BLD-MCNC: 9 MG/DL (ref 8.5–10.1)
CHLORIDE SERPL-SCNC: 106 MMOL/L (ref 98–112)
CO2 SERPL-SCNC: 22 MMOL/L (ref 21–32)
CREAT BLD-MCNC: 0.98 MG/DL
ERYTHROCYTE [DISTWIDTH] IN BLOOD BY AUTOMATED COUNT: 13.7 %
GLOBULIN PLAS-MCNC: 3.6 G/DL (ref 2.8–4.4)
GLUCOSE BLD-MCNC: 44 MG/DL (ref 70–99)
HCT VFR BLD AUTO: 48.9 %
HGB BLD-MCNC: 16.2 G/DL
M PROTEIN MFR SERPL ELPH: 6.6 G/DL (ref 6.4–8.2)
MCH RBC QN AUTO: 32.1 PG (ref 26–34)
MCHC RBC AUTO-ENTMCNC: 33.1 G/DL (ref 31–37)
MCV RBC AUTO: 96.8 FL
OSMOLALITY SERPL CALC.SUM OF ELEC: 277 MOSM/KG (ref 275–295)
PATIENT FASTING Y/N/NP: YES
PLATELET # BLD AUTO: 219 10(3)UL (ref 150–450)
POTASSIUM SERPL-SCNC: 5.3 MMOL/L (ref 3.5–5.1)
RBC # BLD AUTO: 5.05 X10(6)UL
SODIUM SERPL-SCNC: 135 MMOL/L (ref 136–145)
WBC # BLD AUTO: 8.6 X10(3) UL (ref 4–11)

## 2021-08-11 PROCEDURE — 1111F DSCHRG MED/CURRENT MED MERGE: CPT | Performed by: NURSE PRACTITIONER

## 2021-08-11 PROCEDURE — 1124F ACP DISCUSS-NO DSCNMKR DOCD: CPT | Performed by: NURSE PRACTITIONER

## 2021-08-11 PROCEDURE — 85027 COMPLETE CBC AUTOMATED: CPT

## 2021-08-11 PROCEDURE — 80053 COMPREHEN METABOLIC PANEL: CPT

## 2021-08-11 PROCEDURE — 99310 SBSQ NF CARE HIGH MDM 45: CPT | Performed by: NURSE PRACTITIONER

## 2021-08-11 RX ORDER — ACETAMINOPHEN 325 MG/1
650 TABLET ORAL EVERY 6 HOURS PRN
COMMUNITY

## 2021-08-11 RX ORDER — POLYETHYLENE GLYCOL 3350 17 G/17G
17 POWDER, FOR SOLUTION ORAL DAILY PRN
COMMUNITY
End: 2021-09-10 | Stop reason: ALTCHOICE

## 2021-08-11 NOTE — PROGRESS NOTES
Mezaguicho Allen  : 10/16/1950  Age 65year old  male patient is admitted to Facility: The [address] [place] [address] at [facility] for subacute rehab.     [address] [place] [address] Drive date: 2021  Discharge date to [facility]: August 10, 2021  ELOS: 14 days  Anticipated High cholesterol    • Hyperlipidemia    • Hypertension    • Long term (current) use of anticoagulants    • Multiple sclerosis (HCC)    • Neuropathy    • Obesity, unspecified    • Other specified disorder of rectum and anus    • Peptic ulcer, unspecified si LUMBAR EPIDURAL;  Surgeon: Sampson Gaxiola MD;  Location: [facility] FOR PAIN MANAGEMENT   • OPEN HEART SURGERY (PBP)      1998 4CABG X 4   • OTHER SURGICAL HISTORY  2000    Fiberoptic Examination Gastroscopy w/cauterization   • OTHER SURGICAL HISTORY  1/2 8/10/1977        Years since quittin.0      Smokeless tobacco: Never Used    Vaping Use      Vaping Use: Never used    Alcohol use:  Yes      Alcohol/week: 2.0 standard drinks      Types: 2 Standard drinks or equivalent per week      Comment: social be 2.8 mL 0       VITALS:  /77   Pulse 79   Temp 97 °F (36.1 °C)   Resp 18   Wt 255 lb 3.2 oz (115.8 kg)   SpO2 98%   BMI 39.97 kg/m²      REVIEW OF SYSTEMS:  GENERAL HEALTH:feels well otherwise  SKIN: denies any unusual skin lesions or rashes  WOUNDS: A&OX3, no focal deficits, follows commands  PSYCHIATRIC: calm, cooperative, mood and affect appropriate to situation    Therapy Update pending  Ambulation   ADLs/Transfers   DC planning     Post hospital discharge readmission risk assessment tool:   Pertin 5.8%  5-6 points=Intermediate risk/      12.0%  7 or more points=High risk/         22.8%    DIAGNOSTICS REVIEWED AT THIS VISIT:    Lab Results   Component Value Date    WBC 8.6 08/11/2021    RBC 5.05 08/11/2021    HGB 16.2 08/11/2021    HCT 48.9 08/ daily  Metoprolol 25 mg p.o. twice daily  Potassium chloride 10 mEq daily  Vital signs every shift and as needed  Daily weights; Call if weight gain of 2# overnight or 4# in 5 days  2 gram sodium diet  2 liter/day fluid restriction  Follow up with cardiolo

## 2021-08-13 ENCOUNTER — NURSE ONLY (OUTPATIENT)
Dept: LAB | Age: 71
End: 2021-08-13
Attending: FAMILY MEDICINE
Payer: MEDICARE

## 2021-08-13 ENCOUNTER — SNF VISIT (OUTPATIENT)
Dept: INTERNAL MEDICINE CLINIC | Age: 71
End: 2021-08-13

## 2021-08-13 VITALS
RESPIRATION RATE: 18 BRPM | DIASTOLIC BLOOD PRESSURE: 76 MMHG | HEART RATE: 61 BPM | WEIGHT: 242.19 LBS | BODY MASS INDEX: 38 KG/M2 | TEMPERATURE: 98 F | OXYGEN SATURATION: 98 % | SYSTOLIC BLOOD PRESSURE: 135 MMHG

## 2021-08-13 DIAGNOSIS — Z78.9 DECREASED ACTIVITIES OF DAILY LIVING (ADL): ICD-10-CM

## 2021-08-13 DIAGNOSIS — M48.061 SPINAL STENOSIS OF LUMBAR REGION WITHOUT NEUROGENIC CLAUDICATION: ICD-10-CM

## 2021-08-13 DIAGNOSIS — R53.1 WEAKNESS GENERALIZED: ICD-10-CM

## 2021-08-13 DIAGNOSIS — Z95.810 CARDIAC DEFIBRILLATOR IN PLACE: ICD-10-CM

## 2021-08-13 DIAGNOSIS — M54.41 ACUTE RIGHT-SIDED LOW BACK PAIN WITH RIGHT-SIDED SCIATICA: Primary | ICD-10-CM

## 2021-08-13 DIAGNOSIS — I48.20 ATRIAL FIBRILLATION, CHRONIC (HCC): ICD-10-CM

## 2021-08-13 DIAGNOSIS — I50.22 SYSTOLIC CHF, CHRONIC (HCC): ICD-10-CM

## 2021-08-13 DIAGNOSIS — I10 BENIGN ESSENTIAL HTN: ICD-10-CM

## 2021-08-13 DIAGNOSIS — I25.10 CORONARY ATHEROSCLEROSIS OF NATIVE CORONARY ARTERY: Primary | ICD-10-CM

## 2021-08-13 DIAGNOSIS — Z95.1 S/P CABG (CORONARY ARTERY BYPASS GRAFT): ICD-10-CM

## 2021-08-13 LAB
ANION GAP SERPL CALC-SCNC: 5 MMOL/L (ref 0–18)
BASOPHILS # BLD AUTO: 0.03 X10(3) UL (ref 0–0.2)
BASOPHILS NFR BLD AUTO: 0.3 %
BUN BLD-MCNC: 18 MG/DL (ref 7–18)
CALCIUM BLD-MCNC: 9.1 MG/DL (ref 8.5–10.1)
CHLORIDE SERPL-SCNC: 105 MMOL/L (ref 98–112)
CO2 SERPL-SCNC: 26 MMOL/L (ref 21–32)
CREAT BLD-MCNC: 0.87 MG/DL
EOSINOPHIL # BLD AUTO: 0.01 X10(3) UL (ref 0–0.7)
EOSINOPHIL NFR BLD AUTO: 0.1 %
ERYTHROCYTE [DISTWIDTH] IN BLOOD BY AUTOMATED COUNT: 13.8 %
GLUCOSE BLD-MCNC: 108 MG/DL (ref 70–99)
HCT VFR BLD AUTO: 47.5 %
HGB BLD-MCNC: 15.6 G/DL
IMM GRANULOCYTES # BLD AUTO: 0.14 X10(3) UL (ref 0–1)
IMM GRANULOCYTES NFR BLD: 1.2 %
LYMPHOCYTES # BLD AUTO: 1.37 X10(3) UL (ref 1–4)
LYMPHOCYTES NFR BLD AUTO: 11.6 %
MCH RBC QN AUTO: 31.6 PG (ref 26–34)
MCHC RBC AUTO-ENTMCNC: 32.8 G/DL (ref 31–37)
MCV RBC AUTO: 96.3 FL
MONOCYTES # BLD AUTO: 0.7 X10(3) UL (ref 0.1–1)
MONOCYTES NFR BLD AUTO: 5.9 %
NEUTROPHILS # BLD AUTO: 9.6 X10 (3) UL (ref 1.5–7.7)
NEUTROPHILS # BLD AUTO: 9.6 X10(3) UL (ref 1.5–7.7)
NEUTROPHILS NFR BLD AUTO: 80.9 %
OSMOLALITY SERPL CALC.SUM OF ELEC: 284 MOSM/KG (ref 275–295)
PATIENT FASTING Y/N/NP: YES
PLATELET # BLD AUTO: 274 10(3)UL (ref 150–450)
POTASSIUM SERPL-SCNC: 4 MMOL/L (ref 3.5–5.1)
RBC # BLD AUTO: 4.93 X10(6)UL
SODIUM SERPL-SCNC: 136 MMOL/L (ref 136–145)
WBC # BLD AUTO: 11.9 X10(3) UL (ref 4–11)

## 2021-08-13 PROCEDURE — 80048 BASIC METABOLIC PNL TOTAL CA: CPT

## 2021-08-13 PROCEDURE — 99309 SBSQ NF CARE MODERATE MDM 30: CPT | Performed by: NURSE PRACTITIONER

## 2021-08-13 PROCEDURE — 85025 COMPLETE CBC W/AUTO DIFF WBC: CPT

## 2021-08-13 NOTE — PROGRESS NOTES
Rhona Rojas, 10/16/1950, 65year old, male    Chief Complaint:  Patient presents with:   Follow - Up  Pain  Weakness       Subjective:    HPI:  Emilee Blanco is a 77-year-old male with previous medical history including A. fib, CAD, CABG, hypertension, hyperlipid 2+, arthritis changes to fingers  NEUROLOGIC: A&OX3, no focal deficits, follows commands  PSYCHIATRIC: calm, cooperative, mood and affect appropriate to situation      Therapy update: pending  Transfers/ADLS   Ambulation   DC plan:    Medications reviewed: 12.5 mg daily  Metoprolol 25 mg p.o. twice daily  Potassium chloride 10 mEq daily  Vital signs every shift and as needed  Daily weights; Call if weight gain of 2# overnight or 4# in 5 days  Wt down 12 lbs??, eating well  2 gram sodium diet  2 liter/day flu

## 2021-08-17 ENCOUNTER — EXTERNAL FACILITY (OUTPATIENT)
Dept: FAMILY MEDICINE CLINIC | Facility: CLINIC | Age: 71
End: 2021-08-17

## 2021-08-17 DIAGNOSIS — K21.00 GASTROESOPHAGEAL REFLUX DISEASE WITH ESOPHAGITIS WITHOUT HEMORRHAGE: ICD-10-CM

## 2021-08-17 DIAGNOSIS — I10 PRIMARY HYPERTENSION: ICD-10-CM

## 2021-08-17 DIAGNOSIS — M62.838 SPASM OF MUSCLE: ICD-10-CM

## 2021-08-17 DIAGNOSIS — M54.9 INTRACTABLE BACK PAIN: ICD-10-CM

## 2021-08-17 DIAGNOSIS — I10 ESSENTIAL HYPERTENSION: ICD-10-CM

## 2021-08-17 DIAGNOSIS — I65.23 ATHEROSCLEROSIS OF BOTH CAROTID ARTERIES: ICD-10-CM

## 2021-08-17 DIAGNOSIS — I25.10 ATHEROSCLEROSIS OF NATIVE CORONARY ARTERY OF NATIVE HEART WITHOUT ANGINA PECTORIS: ICD-10-CM

## 2021-08-17 DIAGNOSIS — I42.0 DCM (DILATED CARDIOMYOPATHY) (HCC): ICD-10-CM

## 2021-08-17 DIAGNOSIS — M54.9 CHRONIC BILATERAL BACK PAIN, UNSPECIFIED BACK LOCATION: ICD-10-CM

## 2021-08-17 DIAGNOSIS — I20.0 UNSTABLE ANGINA (HCC): ICD-10-CM

## 2021-08-17 DIAGNOSIS — I48.11 LONGSTANDING PERSISTENT ATRIAL FIBRILLATION (HCC): ICD-10-CM

## 2021-08-17 DIAGNOSIS — M48.00 SPINAL STENOSIS, UNSPECIFIED SPINAL REGION: ICD-10-CM

## 2021-08-17 DIAGNOSIS — Z95.810 CARDIAC DEFIBRILLATOR IN PLACE: ICD-10-CM

## 2021-08-17 DIAGNOSIS — M16.10 HIP ARTHRITIS: ICD-10-CM

## 2021-08-17 DIAGNOSIS — Z95.1 S/P CABG (CORONARY ARTERY BYPASS GRAFT): ICD-10-CM

## 2021-08-17 DIAGNOSIS — Z95.810 S/P IMPLANTATION OF AUTOMATIC CARDIOVERTER/DEFIBRILLATOR (AICD): ICD-10-CM

## 2021-08-17 DIAGNOSIS — K80.20 GALLBLADDER STONE WITHOUT CHOLECYSTITIS OR OBSTRUCTION: ICD-10-CM

## 2021-08-17 DIAGNOSIS — I25.10 CAD IN NATIVE ARTERY: ICD-10-CM

## 2021-08-17 DIAGNOSIS — E78.00 PURE HYPERCHOLESTEROLEMIA: ICD-10-CM

## 2021-08-17 DIAGNOSIS — R59.0 MEDIASTINAL LYMPHADENOPATHY: ICD-10-CM

## 2021-08-17 DIAGNOSIS — I49.9 VENTRICULAR ARRHYTHMIA: ICD-10-CM

## 2021-08-17 DIAGNOSIS — K22.70 BARRETT'S ESOPHAGUS WITHOUT DYSPLASIA: ICD-10-CM

## 2021-08-17 DIAGNOSIS — Z91.81 AT RISK FOR FALLING: ICD-10-CM

## 2021-08-17 DIAGNOSIS — I48.20 ATRIAL FIBRILLATION, CHRONIC (HCC): ICD-10-CM

## 2021-08-17 DIAGNOSIS — G89.29 CHRONIC BILATERAL BACK PAIN, UNSPECIFIED BACK LOCATION: ICD-10-CM

## 2021-08-17 DIAGNOSIS — S43.429D SPRAIN OF ROTATOR CUFF CAPSULE, UNSPECIFIED LATERALITY, SUBSEQUENT ENCOUNTER: ICD-10-CM

## 2021-08-17 DIAGNOSIS — I21.4 NSTEMI (NON-ST ELEVATED MYOCARDIAL INFARCTION) (HCC): ICD-10-CM

## 2021-08-17 DIAGNOSIS — G35 MULTIPLE SCLEROSIS (HCC): ICD-10-CM

## 2021-08-17 DIAGNOSIS — I10 BENIGN ESSENTIAL HTN: ICD-10-CM

## 2021-08-17 DIAGNOSIS — R53.1 WEAKNESS GENERALIZED: Primary | ICD-10-CM

## 2021-08-17 DIAGNOSIS — E87.6 HYPOKALEMIA: ICD-10-CM

## 2021-08-17 DIAGNOSIS — R09.89 PULMONARY VASCULAR CONGESTION: ICD-10-CM

## 2021-08-17 DIAGNOSIS — I50.22 SYSTOLIC CHF, CHRONIC (HCC): ICD-10-CM

## 2021-08-18 ENCOUNTER — SNF DISCHARGE (OUTPATIENT)
Dept: INTERNAL MEDICINE CLINIC | Age: 71
End: 2021-08-18

## 2021-08-18 ENCOUNTER — NURSE ONLY (OUTPATIENT)
Dept: LAB | Age: 71
End: 2021-08-18
Attending: FAMILY MEDICINE
Payer: MEDICARE

## 2021-08-18 VITALS
OXYGEN SATURATION: 98 % | HEART RATE: 80 BPM | SYSTOLIC BLOOD PRESSURE: 116 MMHG | BODY MASS INDEX: 38 KG/M2 | WEIGHT: 243.69 LBS | RESPIRATION RATE: 18 BRPM | TEMPERATURE: 97 F | DIASTOLIC BLOOD PRESSURE: 64 MMHG

## 2021-08-18 DIAGNOSIS — I10 BENIGN ESSENTIAL HTN: ICD-10-CM

## 2021-08-18 DIAGNOSIS — I48.20 CHRONIC ATRIAL FIBRILLATION (HCC): Primary | ICD-10-CM

## 2021-08-18 DIAGNOSIS — R53.1 WEAKNESS GENERALIZED: ICD-10-CM

## 2021-08-18 DIAGNOSIS — M54.41 ACUTE RIGHT-SIDED LOW BACK PAIN WITH RIGHT-SIDED SCIATICA: Primary | ICD-10-CM

## 2021-08-18 DIAGNOSIS — Z95.1 S/P CABG (CORONARY ARTERY BYPASS GRAFT): ICD-10-CM

## 2021-08-18 DIAGNOSIS — Z95.810 CARDIAC DEFIBRILLATOR IN PLACE: ICD-10-CM

## 2021-08-18 DIAGNOSIS — Z78.9 DECREASED ACTIVITIES OF DAILY LIVING (ADL): ICD-10-CM

## 2021-08-18 DIAGNOSIS — M48.061 SPINAL STENOSIS OF LUMBAR REGION WITHOUT NEUROGENIC CLAUDICATION: ICD-10-CM

## 2021-08-18 DIAGNOSIS — I50.22 SYSTOLIC CHF, CHRONIC (HCC): ICD-10-CM

## 2021-08-18 DIAGNOSIS — I48.20 ATRIAL FIBRILLATION, CHRONIC (HCC): ICD-10-CM

## 2021-08-18 LAB
ALBUMIN SERPL-MCNC: 3 G/DL (ref 3.4–5)
ALBUMIN/GLOB SERPL: 1 {RATIO} (ref 1–2)
ALP LIVER SERPL-CCNC: 100 U/L
ALT SERPL-CCNC: 96 U/L
ANION GAP SERPL CALC-SCNC: 4 MMOL/L (ref 0–18)
AST SERPL-CCNC: 53 U/L (ref 15–37)
BASOPHILS # BLD: 0 X10(3) UL (ref 0–0.2)
BASOPHILS NFR BLD: 0 %
BILIRUB SERPL-MCNC: 0.6 MG/DL (ref 0.1–2)
BUN BLD-MCNC: 16 MG/DL (ref 7–18)
CALCIUM BLD-MCNC: 8.7 MG/DL (ref 8.5–10.1)
CHLORIDE SERPL-SCNC: 109 MMOL/L (ref 98–112)
CO2 SERPL-SCNC: 28 MMOL/L (ref 21–32)
CREAT BLD-MCNC: 0.89 MG/DL
EOSINOPHIL # BLD: 0.1 X10(3) UL (ref 0–0.7)
EOSINOPHIL NFR BLD: 1 %
ERYTHROCYTE [DISTWIDTH] IN BLOOD BY AUTOMATED COUNT: 14.1 %
GLOBULIN PLAS-MCNC: 2.9 G/DL (ref 2.8–4.4)
GLUCOSE BLD-MCNC: 67 MG/DL (ref 70–99)
HCT VFR BLD AUTO: 48.2 %
HGB BLD-MCNC: 16 G/DL
LYMPHOCYTES NFR BLD: 1.63 X10(3) UL (ref 1–4)
LYMPHOCYTES NFR BLD: 17 %
M PROTEIN MFR SERPL ELPH: 5.9 G/DL (ref 6.4–8.2)
MCH RBC QN AUTO: 31.6 PG (ref 26–34)
MCHC RBC AUTO-ENTMCNC: 33.2 G/DL (ref 31–37)
MCV RBC AUTO: 95.1 FL
METAMYELOCYTES # BLD: 0.19 X10(3) UL
METAMYELOCYTES NFR BLD: 2 %
MONOCYTES # BLD: 1.25 X10(3) UL (ref 0.1–1)
MONOCYTES NFR BLD: 13 %
MORPHOLOGY: NORMAL
NEUTROPHILS # BLD AUTO: 5.52 X10 (3) UL (ref 1.5–7.7)
NEUTROPHILS NFR BLD: 67 %
NEUTS HYPERSEG # BLD: 6.43 X10(3) UL (ref 1.5–7.7)
OSMOLALITY SERPL CALC.SUM OF ELEC: 291 MOSM/KG (ref 275–295)
PATIENT FASTING Y/N/NP: YES
PLATELET # BLD AUTO: 218 10(3)UL (ref 150–450)
PLATELET MORPHOLOGY: NORMAL
POTASSIUM SERPL-SCNC: 3.7 MMOL/L (ref 3.5–5.1)
RBC # BLD AUTO: 5.07 X10(6)UL
SODIUM SERPL-SCNC: 141 MMOL/L (ref 136–145)
TOTAL CELLS COUNTED: 100
WBC # BLD AUTO: 9.6 X10(3) UL (ref 4–11)

## 2021-08-18 PROCEDURE — 99316 NF DSCHRG MGMT 30 MIN+: CPT | Performed by: NURSE PRACTITIONER

## 2021-08-18 PROCEDURE — 85027 COMPLETE CBC AUTOMATED: CPT

## 2021-08-18 PROCEDURE — 85007 BL SMEAR W/DIFF WBC COUNT: CPT

## 2021-08-18 PROCEDURE — 85025 COMPLETE CBC W/AUTO DIFF WBC: CPT

## 2021-08-18 PROCEDURE — 80053 COMPREHEN METABOLIC PANEL: CPT

## 2021-08-18 NOTE — PROGRESS NOTES
Aliya Simon, 10/16/1950, 65year old, male is being discharged from [address] [place] [address] at [address] [facility]    Date of [facility]    Date of Discharge: 8/20/21                                 Admitting Diagnoses:  Lumbar of breath, wheezing or cough   CARDIOVASCULAR:denies chest pain, no palpitations , denies orthopnea, denies cough  GI: denies nausea, vomiting, constipation, diarrhea; no rectal bleeding; no heartburn  Gu: No urinary frequency, urgency or dysuria  MUSCULOS RN/PT/OT/Bath Aide  · Equipment per PT recommendations: [address]    Most recent lab results:  Lab Results   Component Value Date    WBC 9.6 08/18/2021    RBC 5.07 08/18/2021    HGB 16.0 08/18/2021    HCT 48.2 08/18/2021    MCV 95.1 08/18/2021    MCH 31.6 08/ needed  Daily weights; Call if weight gain of 2# overnight or 4# in 5 days  Wt down 12 lbs??, eating well though, stable now  2 gram sodium diet  2 liter/day fluid restriction  Follow up with cardiology as an outpatient     MS  Ocrevus 600 mg IV every 6 mo 36

## 2021-08-23 ENCOUNTER — PATIENT OUTREACH (OUTPATIENT)
Dept: CASE MANAGEMENT | Age: 71
End: 2021-08-23

## 2021-08-23 ENCOUNTER — TELEPHONE (OUTPATIENT)
Dept: INTERNAL MEDICINE CLINIC | Facility: CLINIC | Age: 71
End: 2021-08-23

## 2021-08-23 PROBLEM — R77.8 ELEVATED TROPONIN: Status: RESOLVED | Noted: 2017-05-19 | Resolved: 2021-08-23

## 2021-08-23 PROBLEM — R50.9 ACUTE FEBRILE ILLNESS: Status: RESOLVED | Noted: 2021-03-04 | Resolved: 2021-08-23

## 2021-08-23 PROBLEM — R06.00 DYSPNEA, UNSPECIFIED TYPE: Status: RESOLVED | Noted: 2017-05-19 | Resolved: 2021-08-23

## 2021-08-23 PROBLEM — R07.89 CHEST PAIN, ATYPICAL: Status: RESOLVED | Noted: 2018-10-04 | Resolved: 2021-08-23

## 2021-08-23 PROBLEM — R07.9 ACUTE CHEST PAIN: Status: RESOLVED | Noted: 2017-05-19 | Resolved: 2021-08-23

## 2021-08-23 PROBLEM — R79.89 ELEVATED TROPONIN: Status: RESOLVED | Noted: 2017-05-19 | Resolved: 2021-08-23

## 2021-08-24 NOTE — PROGRESS NOTES
HPI: New admission Catia, was seen on 8/17/21    Sahil Winston is a 70 year old male who presents for Follow - Up (Leonard new admissoin for lumbar radiculitis with poor mobility )   71 yo male presenting to [facility] with PMF of CAD, CHF, PAF, HTN, HLD, MS s/p AICD for Vtach with poor mobility with worsening spinal stenosis with back and hip pain with radiation to his lower extremities.   Patient reports no chest pain or shortness of breath with bladder or bowel incontinences.   Patient reports no pain just worsening mobility with morbid obesity and history of OA of multiple joints.         Past History:   He  has a past medical history of Atrial fibrillation ([facility]), Backache, unspecified, Jo's esophagus (6/29/2012), Cellulitis and abscess of unspecified site, Coronary atherosclerosis of unspecified type of vessel, native or graft (6/29/2012), Heart attack ([facility]), Herpes zoster without mention of complication, High blood pressure, High cholesterol, Hyperlipidemia, Hypertension, Long term (current) use of anticoagulants, Multiple sclerosis ([facility]), Neuropathy, Obesity, unspecified, Other specified disorder of rectum and anus, Peptic ulcer, unspecified site, unspecified as acute or chronic, without mention of hemorrhage, perforation, or obstruction (6/29/2012), Personal history of methicillin resistant Staphylococcus aureus, Personal history of other diseases of circulatory system, Pre-operative cardiovascular examination, Pulmonary vascular congestion, Pure hypercholesterolemia (6/29/2012), Unspecified sleep apnea, Unspecified urinary incontinence, Ventricular arrhythmia, and Visual impairment.   He  has a past surgical history that includes appendectomy (1991); cabg (1998); surgical stent (2010); injection, anesthetic/steroid, transforaminal epidural; lumbar/sacral, single level (7/12/2013); patient withough preoperative order for iv antibiotic surgical site infection prophylaxis. (7/12/2013); patient documented not  to have experienced any of the following events (7/12/2013); injection, w/wo contrast, dx/therapeutic substance, epidural/subarachnoid; lumbar/sacral (7/31/2013); fluor gid & loclzj ndl/cath spi dx/ther njx (7/31/2013); patient withough preoperative order for iv antibiotic surgical site infection prophylaxis. (7/31/2013); patient documented not to have experienced any of the following events (7/31/2013); injection, w/wo contrast, dx/therapeutic substance, epidural/subarachnoid; lumbar/sacral (8/14/2013); fluor gid & loclzj ndl/cath spi dx/ther njx (8/14/2013); patient withough preoperative order for iv antibiotic surgical site infection prophylaxis. (8/14/2013); patient documented not to have experienced any of the following events (8/14/2013); Cardiac defibrillator placement; open heart surgery (pbp); colonoscopy (1/2016); other surgical history (2000); and other surgical history (1/2016).   His family history includes Arthritis in his mother; Heart Disease in his father; Heart Disorder in his father and paternal grandmother.   He  reports that he quit smoking about 44 years ago. His smoking use included cigarettes. He has a 36.00 pack-year smoking history. He has never used smokeless tobacco. He reports current alcohol use of about 2.0 standard drinks of alcohol per week. He reports that he does not use drugs.     He has a current medication list which includes the following long-term medication(s): atorvastatin and amiodarone.   He has No Known Allergies.   PEG 3350 17 g Oral Powd Pack, Take 17 g by mouth daily as needed.  acetaminophen 325 MG Oral Tab, Take 650 mg by mouth every 6 (six) hours as needed for Pain.  metoprolol tartrate 25 MG Oral Tab, Take 1 tablet (25 mg total) by mouth 2 (two) times daily.  methylPREDNISolone 4 MG Oral Tablet Therapy Pack, Take as directed on dose pack instructions  Losartan Potassium 25 MG Oral Tab, Take 12.5 mg by mouth daily.  atorvastatin 40 MG Oral Tab, Take 40 mg by mouth  nightly.  clopidogrel 75 MG Oral Tab, Take 75 mg by mouth daily.  potassium chloride 10 MEQ Oral Tab CR, Take 10 mEq by mouth daily.  Multiple Vitamins-Minerals (TAB-A-STEPHEN) Oral Tab, Take 1 tablet by mouth daily.  apixaban 5 MG Oral Tab, Take 1 tablet (5 mg total) by mouth 2 (two) times daily.  furosemide 20 MG Oral Tab, Take 1 tablet (20 mg total) by mouth daily.  amiodarone HCl 200 MG Oral Tab, Take 1 tablet (200 mg total) by mouth daily.  Omega-3 Fatty Acids (OMEGA-3 OR), Take 1 capsule by mouth daily.    Ocrelizumab (OCREVUS) 300 MG/10ML Intravenous Solution, Inject 600 mg into the vein every 6 (six) months.    Calcium Carbonate (CALCIUM 600 OR), Take 1 Tab by mouth daily.  Ascorbic Acid (VITAMIN C CR) 1000 MG Oral Tab CR, Take 1 tablet by mouth daily.    Cholecalciferol (VITAMIN D OR), Take 4,000 Units by mouth daily.      No current facility-administered medications on file prior to visit.        REVIEW OF SYSTEMS:   Patient denies shortness of breath, denies chest pain and denies any recent fevers or chills.    Patient reports no urinary complaints and denies headaches or visual disturbances.   Patient denies any abdominal pain at this time. Patient has no new skin lesions.  Patient reports no acute back pain and reports no dizziness or headaches.   Patient reports no visual disturbances and reports hearing has been about the same.   Patient reports no recent injury or trauma.     Decreased mobility with worsening lumbar radiculitis worse along right hip and radiation to legs.           EXAM:    There were no vitals taken for this visit. Estimated body mass index is 38.17 kg/m² as calculated from the following:    Height as of 4/16/21: 5' 7\" (1.702 m).    Weight as of 8/18/21: 243 lb 11.2 oz (110.5 kg).    General Appearance:  Alert, cooperative, no distress, appears stated age   Head:  Normocephalic, without obvious abnormality, atraumatic   Eyes:  PERRL, conjunctiva/corneas clear, EOM's intact, fundi  benign, both eyes   Ears:  Normal TM's and external ear canals, both ears   Nose: Nares normal, septum midline, mucosa normal, no drainage or sinus tenderness   Throat: Lips, mucosa, and tongue normal; teeth and gums normal   Neck: Supple, symmetrical, trachea midline, no adenopathy, thyroid: not enlarged, symmetric, no tenderness/mass/nodules, no carotid bruit or JVD   Back:   Symmetric, no curvature, ROM normal, no CVA tenderness   Lungs:   Clear to auscultation bilaterally, respirations unlabored   Chest Wall:  No tenderness or deformity   Heart:  Regular rate and rhythm, S1, S2 normal, no murmur, rub or gallop   Abdomen:   Soft, non-tender, bowel sounds active all four quadrants,  no masses, no organomegaly   Genitalia:     Rectal:     Extremities: Extremities normal, atraumatic, no cyanosis or edema   Pulses: 2+ and symmetric   Skin: Skin color, texture, turgor normal, no rashes or lesions   Lymph nodes: Cervical, supraclavicular, and axillary nodes normal   Neurologic: Normal     Poor mobility of lower extremities with decreased ROM of lumbar region and hip and poor mobility of both knees.               ASSESSMENT AND PLAN:   Diagnoses and all orders for this visit:    Weakness generalized    Ventricular arrhythmia    Unstable angina (HCC)    Systolic CHF, chronic (HCC)    Spinal stenosis, unspecified spinal region    Spasm of muscle    S/P implantation of automatic cardioverter/defibrillator (AICD)    S/P CABG (coronary artery bypass graft)    Sprain of rotator cuff capsule, unspecified laterality, subsequent encounter    Gastroesophageal reflux disease with esophagitis without hemorrhage    At risk for falling    Atherosclerosis of native coronary artery of native heart without angina pectoris    Longstanding persistent atrial fibrillation (HCC)    Atrial fibrillation, chronic (HCC)    Jo's esophagus without dysplasia    Benign essential HTN    Cardiac defibrillator-[facility] [employer]    Chronic bilateral back  pain, unspecified back location    DCM (dilated cardiomyopathy) (HCC)    Essential hypertension    Gallbladder stone without cholecystitis or obstruction    Atherosclerosis of both carotid arteries    CAD in native artery    Hypokalemia    Primary hypertension    Pure hypercholesterolemia    Hip arthritis    Intractable back pain    Mediastinal lymphadenopathy    NSTEMI (non-ST elevated myocardial infarction) ([facility])    Pulmonary vascular congestion     -will continue with PT/OT and pain control, oral steroids and pain control.   -will continue with cardiaca medication and CHF rx along with HTN rx control and AC, with fluid restriction.   -continue with MS rx  -CPAP      Sonu Owens MD,

## 2021-08-24 NOTE — PROGRESS NOTES
[employer] message sent to the pt for TCM. NCM contact information was included in message.    TCC contact information was included in [facility].

## 2021-08-24 NOTE — PROGRESS NOTES
Pt did not complete HFU appt,     Attempted to contact pt for TCM however no answer. Call continued to ring and did not go to a . [facility] to try again at a later time.

## 2021-08-25 ENCOUNTER — TELEPHONE (OUTPATIENT)
Dept: FAMILY MEDICINE CLINIC | Facility: CLINIC | Age: 71
End: 2021-08-25

## 2021-08-25 NOTE — TELEPHONE ENCOUNTER
Marcella (PT) informed of Dr. Courtney Xiao advise and she voiced understanding. States she will be seeing pt tomorrow and will inform him to call our office.

## 2021-08-25 NOTE — TELEPHONE ENCOUNTER
We will have to see patient within a month for follow-up visit so we have the encounter to document need for patient services.   Thank you

## 2021-08-25 NOTE — TELEPHONE ENCOUNTER
Marcella (PT) from [place] [id number] called pt had PT 2 x this week, once a week next week, then OT after. Jacquie Gibbs will be sending notes via fax.

## 2021-08-26 ENCOUNTER — MED REC SCAN ONLY (OUTPATIENT)
Dept: FAMILY MEDICINE CLINIC | Facility: CLINIC | Age: 71
End: 2021-08-26

## 2021-08-27 NOTE — TELEPHONE ENCOUNTER
Call from pt-sts received message from [facility] to call our office to schedule follow up appt w dr Jeannine Johnson within a month.    Record shows this will be [facility] hospital follow up-admit 8/5/21 to 8/10/21 w dx as follows:  Discharge Diagnosis:

## 2021-08-27 NOTE — TELEPHONE ENCOUNTER
TCM can be only done within 2 weeks after discharge from the hospital so we will keep his appointment as scheduled in September.  Thanks

## 2021-09-02 ENCOUNTER — TELEPHONE (OUTPATIENT)
Dept: FAMILY MEDICINE CLINIC | Facility: CLINIC | Age: 71
End: 2021-09-02

## 2021-09-02 RX ORDER — METHYLPREDNISOLONE 4 MG/1
TABLET ORAL
Qty: 1 EACH | Refills: 0 | Status: SHIPPED | OUTPATIENT
Start: 2021-09-02 | End: 2021-09-10 | Stop reason: ALTCHOICE

## 2021-09-02 NOTE — TELEPHONE ENCOUNTER
Received call from [address] (occupational therapist)    Jayla Glynn he completed evaluation for plan of care  He will be seeing pt 1x per week for 5wks    Alex/OT: [phone]      *Sent as FYI per Alex's request

## 2021-09-02 NOTE — TELEPHONE ENCOUNTER
[facility] Advised of Medrol dose pack sent to [address]. Pt advised to follow instructions on package. Pt also requested for us to notify [facility]/ [facility] nurse, to inform of Medrol and dose. Call to [facility] nurse. Reached identified VM.

## 2021-09-02 NOTE — TELEPHONE ENCOUNTER
Call from [facility]. S/w Shade/RN, sees [facility] 1x per week   Shade's Cell Phone [phone]  [facility] pt was recently in hospital for intractable back pain.  ED--> 08/05/21    Was prescribed methylPREDNISolone 4 MG Oral Tablet Therapy Pack #21 at time of discharge,

## 2021-09-10 ENCOUNTER — TELEPHONE (OUTPATIENT)
Dept: FAMILY MEDICINE CLINIC | Facility: CLINIC | Age: 71
End: 2021-09-10

## 2021-09-10 ENCOUNTER — OFFICE VISIT (OUTPATIENT)
Dept: FAMILY MEDICINE CLINIC | Facility: CLINIC | Age: 71
End: 2021-09-10
Payer: MEDICARE

## 2021-09-10 ENCOUNTER — LAB ENCOUNTER (OUTPATIENT)
Dept: LAB | Age: 71
End: 2021-09-10
Attending: FAMILY MEDICINE
Payer: MEDICARE

## 2021-09-10 VITALS
BODY MASS INDEX: 38.61 KG/M2 | RESPIRATION RATE: 18 BRPM | SYSTOLIC BLOOD PRESSURE: 116 MMHG | DIASTOLIC BLOOD PRESSURE: 58 MMHG | HEART RATE: 63 BPM | WEIGHT: 246 LBS | OXYGEN SATURATION: 98 % | HEIGHT: 67 IN | TEMPERATURE: 97 F

## 2021-09-10 DIAGNOSIS — M79.89 SWELLING OF RIGHT FOOT: ICD-10-CM

## 2021-09-10 DIAGNOSIS — Z79.01 ANTICOAGULATED: ICD-10-CM

## 2021-09-10 DIAGNOSIS — R21 RASH AND NONSPECIFIC SKIN ERUPTION: Primary | ICD-10-CM

## 2021-09-10 DIAGNOSIS — M79.89 SWELLING OF LIMB: ICD-10-CM

## 2021-09-10 DIAGNOSIS — R21 RASH AND NONSPECIFIC SKIN ERUPTION: ICD-10-CM

## 2021-09-10 DIAGNOSIS — I48.20 CHRONIC ATRIAL FIBRILLATION (HCC): ICD-10-CM

## 2021-09-10 DIAGNOSIS — R21 RASH AND OTHER NONSPECIFIC SKIN ERUPTION: Primary | ICD-10-CM

## 2021-09-10 LAB
ALBUMIN SERPL-MCNC: 3.5 G/DL (ref 3.4–5)
ALBUMIN/GLOB SERPL: 1.1 {RATIO} (ref 1–2)
ALP LIVER SERPL-CCNC: 99 U/L
ALT SERPL-CCNC: 104 U/L
ANION GAP SERPL CALC-SCNC: 4 MMOL/L (ref 0–18)
APTT PPP: 32.9 SECONDS (ref 25.4–36.1)
AST SERPL-CCNC: 87 U/L (ref 15–37)
BASOPHILS # BLD AUTO: 0.11 X10(3) UL (ref 0–0.2)
BASOPHILS NFR BLD AUTO: 1 %
BILIRUB SERPL-MCNC: 0.8 MG/DL (ref 0.1–2)
BUN BLD-MCNC: 16 MG/DL (ref 7–18)
CALCIUM BLD-MCNC: 9.8 MG/DL (ref 8.5–10.1)
CHLORIDE SERPL-SCNC: 109 MMOL/L (ref 98–112)
CO2 SERPL-SCNC: 27 MMOL/L (ref 21–32)
CREAT BLD-MCNC: 0.92 MG/DL
EOSINOPHIL # BLD AUTO: 0.07 X10(3) UL (ref 0–0.7)
EOSINOPHIL NFR BLD AUTO: 0.6 %
ERYTHROCYTE [DISTWIDTH] IN BLOOD BY AUTOMATED COUNT: 14.2 %
GLOBULIN PLAS-MCNC: 3.2 G/DL (ref 2.8–4.4)
GLUCOSE BLD-MCNC: 112 MG/DL (ref 70–99)
HCT VFR BLD AUTO: 48.7 %
HGB BLD-MCNC: 15.9 G/DL
IMM GRANULOCYTES # BLD AUTO: 0.35 X10(3) UL (ref 0–1)
IMM GRANULOCYTES NFR BLD: 3.1 %
INR BLD: 1.35 (ref 0.89–1.11)
LYMPHOCYTES # BLD AUTO: 1.74 X10(3) UL (ref 1–4)
LYMPHOCYTES NFR BLD AUTO: 15.5 %
M PROTEIN MFR SERPL ELPH: 6.7 G/DL (ref 6.4–8.2)
MCH RBC QN AUTO: 31 PG (ref 26–34)
MCHC RBC AUTO-ENTMCNC: 32.6 G/DL (ref 31–37)
MCV RBC AUTO: 94.9 FL
MONOCYTES # BLD AUTO: 1.09 X10(3) UL (ref 0.1–1)
MONOCYTES NFR BLD AUTO: 9.7 %
NEUTROPHILS # BLD AUTO: 7.84 X10 (3) UL (ref 1.5–7.7)
NEUTROPHILS # BLD AUTO: 7.84 X10(3) UL (ref 1.5–7.7)
NEUTROPHILS NFR BLD AUTO: 70.1 %
OSMOLALITY SERPL CALC.SUM OF ELEC: 292 MOSM/KG (ref 275–295)
PATIENT FASTING Y/N/NP: NO
PLATELET # BLD AUTO: 346 10(3)UL (ref 150–450)
POTASSIUM SERPL-SCNC: 3.6 MMOL/L (ref 3.5–5.1)
PSA SERPL DL<=0.01 NG/ML-MCNC: 17 SECONDS (ref 12.2–14.5)
RBC # BLD AUTO: 5.13 X10(6)UL
SODIUM SERPL-SCNC: 140 MMOL/L (ref 136–145)
URATE SERPL-MCNC: 5.2 MG/DL
WBC # BLD AUTO: 11.2 X10(3) UL (ref 4–11)

## 2021-09-10 PROCEDURE — 36415 COLL VENOUS BLD VENIPUNCTURE: CPT

## 2021-09-10 PROCEDURE — 85025 COMPLETE CBC W/AUTO DIFF WBC: CPT

## 2021-09-10 PROCEDURE — 85730 THROMBOPLASTIN TIME PARTIAL: CPT

## 2021-09-10 PROCEDURE — 99214 OFFICE O/P EST MOD 30 MIN: CPT | Performed by: FAMILY MEDICINE

## 2021-09-10 PROCEDURE — 84550 ASSAY OF BLOOD/URIC ACID: CPT

## 2021-09-10 PROCEDURE — 85610 PROTHROMBIN TIME: CPT

## 2021-09-10 PROCEDURE — 80053 COMPREHEN METABOLIC PANEL: CPT

## 2021-09-10 RX ORDER — AMOXICILLIN AND CLAVULANATE POTASSIUM 875; 125 MG/1; MG/1
1 TABLET, FILM COATED ORAL 2 TIMES DAILY
Qty: 20 TABLET | Refills: 0 | Status: SHIPPED | OUTPATIENT
Start: 2021-09-10 | End: 2021-09-15

## 2021-09-10 NOTE — TELEPHONE ENCOUNTER
1. What are your symptoms? Right foot is purple and blue. Swollen slightly warm to the touch getting worse daily. 2. How long have you been having these symptoms? About 3 days         3. Have you done anything already to treat your symptoms?   Pt rep

## 2021-09-10 NOTE — PROGRESS NOTES
Edinson Pierre is a 65year old male. cc swelling of the right leg, rash  HPI:   Patient is coming to the office for evaluation of the rash she started to develop on Monday. Initially it was on the left hand. It was by the time.   Is got bluish dark in colo Tab Take 1 tablet (200 mg total) by mouth daily. 90 tablet 3   • Omega-3 Fatty Acids (OMEGA-3 OR) Take 1 capsule by mouth daily. • Ocrelizumab (OCREVUS) 300 MG/10ML Intravenous Solution Inject 600 mg into the vein every 6 (six) months.    2.8 mL 0   • Use: Never used    Alcohol use:  Yes      Alcohol/week: 2.0 standard drinks      Types: 2 Standard drinks or equivalent per week      Comment: social beers    Drug use: No       REVIEW OF SYSTEMS:   GENERAL HEALTH: feels well otherwise  SKIN: Bluish discolo clavulanate 875-125 MG Oral Tab 20 tablet 0     Sig: Take 1 tablet by mouth 2 (two) times daily for 10 days. Start Augmentin 875 mg 1 tablet twice a day. I have  discussed with patient use, expected action and side effects of medication.  Questions answere

## 2021-09-10 NOTE — PATIENT INSTRUCTIONS
Start Augmentin 875 mg 1 tablet twice a day. Take probiotic daily. Stop Plavix for 1 week. Continue Eliquis for now. Monitor symptoms.   Follow-up next Wednesday at 12:50 pm.

## 2021-09-10 NOTE — TELEPHONE ENCOUNTER
S/w pt. Reports pain and swelling in rt foot x 5 days. Denies injury  sts it is red and purple  Left hand has red and purple discoloration as well but no pain. No cough, no sob. Can you see him today somewhere? Or Dr Ban Santiago?   Please advise thanks

## 2021-09-15 ENCOUNTER — HOSPITAL ENCOUNTER (INPATIENT)
Facility: HOSPITAL | Age: 71
LOS: 5 days | Discharge: NURSING, REHAB OR LONG-TERM CARE | DRG: 580 | End: 2021-09-20
Attending: EMERGENCY MEDICINE | Admitting: HOSPITALIST
Payer: MEDICARE

## 2021-09-15 ENCOUNTER — OFFICE VISIT (OUTPATIENT)
Dept: FAMILY MEDICINE CLINIC | Facility: CLINIC | Age: 71
End: 2021-09-15
Payer: MEDICARE

## 2021-09-15 ENCOUNTER — APPOINTMENT (OUTPATIENT)
Dept: GENERAL RADIOLOGY | Facility: HOSPITAL | Age: 71
DRG: 580 | End: 2021-09-15
Attending: EMERGENCY MEDICINE
Payer: MEDICARE

## 2021-09-15 VITALS
HEART RATE: 72 BPM | DIASTOLIC BLOOD PRESSURE: 76 MMHG | BODY MASS INDEX: 38.61 KG/M2 | HEIGHT: 67 IN | SYSTOLIC BLOOD PRESSURE: 114 MMHG | TEMPERATURE: 97 F | OXYGEN SATURATION: 98 % | WEIGHT: 246 LBS | RESPIRATION RATE: 18 BRPM

## 2021-09-15 DIAGNOSIS — L03.115 CELLULITIS OF RIGHT LOWER EXTREMITY: Primary | ICD-10-CM

## 2021-09-15 DIAGNOSIS — G35 MULTIPLE SCLEROSIS (HCC): ICD-10-CM

## 2021-09-15 DIAGNOSIS — I48.20 CHRONIC ATRIAL FIBRILLATION (HCC): ICD-10-CM

## 2021-09-15 DIAGNOSIS — M79.89 SWELLING OF RIGHT FOOT: ICD-10-CM

## 2021-09-15 DIAGNOSIS — Z79.01 ANTICOAGULATED: ICD-10-CM

## 2021-09-15 LAB
ALBUMIN SERPL-MCNC: 3.2 G/DL (ref 3.4–5)
ALBUMIN/GLOB SERPL: 0.9 {RATIO} (ref 1–2)
ALP LIVER SERPL-CCNC: 106 U/L
ALT SERPL-CCNC: 53 U/L
ANION GAP SERPL CALC-SCNC: 5 MMOL/L (ref 0–18)
APTT PPP: 37.5 SECONDS (ref 25.4–36.1)
AST SERPL-CCNC: 50 U/L (ref 15–37)
BASOPHILS # BLD AUTO: 0.06 X10(3) UL (ref 0–0.2)
BASOPHILS NFR BLD AUTO: 0.5 %
BILIRUB SERPL-MCNC: 1.2 MG/DL (ref 0.1–2)
BUN BLD-MCNC: 13 MG/DL (ref 7–18)
CALCIUM BLD-MCNC: 9.2 MG/DL (ref 8.5–10.1)
CHLORIDE SERPL-SCNC: 108 MMOL/L (ref 98–112)
CO2 SERPL-SCNC: 25 MMOL/L (ref 21–32)
CREAT BLD-MCNC: 0.92 MG/DL
EOSINOPHIL # BLD AUTO: 0.47 X10(3) UL (ref 0–0.7)
EOSINOPHIL NFR BLD AUTO: 3.9 %
ERYTHROCYTE [DISTWIDTH] IN BLOOD BY AUTOMATED COUNT: 14.6 %
GLOBULIN PLAS-MCNC: 3.6 G/DL (ref 2.8–4.4)
GLUCOSE BLD-MCNC: 78 MG/DL (ref 70–99)
HCT VFR BLD AUTO: 44.5 %
HGB BLD-MCNC: 14.6 G/DL
IMM GRANULOCYTES # BLD AUTO: 0.18 X10(3) UL (ref 0–1)
IMM GRANULOCYTES NFR BLD: 1.5 %
INR BLD: 1.27 (ref 0.89–1.11)
LYMPHOCYTES # BLD AUTO: 1.89 X10(3) UL (ref 1–4)
LYMPHOCYTES NFR BLD AUTO: 15.9 %
MCH RBC QN AUTO: 31.5 PG (ref 26–34)
MCHC RBC AUTO-ENTMCNC: 32.8 G/DL (ref 31–37)
MCV RBC AUTO: 96.1 FL
MONOCYTES # BLD AUTO: 1.82 X10(3) UL (ref 0.1–1)
MONOCYTES NFR BLD AUTO: 15.3 %
NEUTROPHILS # BLD AUTO: 7.48 X10 (3) UL (ref 1.5–7.7)
NEUTROPHILS # BLD AUTO: 7.48 X10(3) UL (ref 1.5–7.7)
NEUTROPHILS NFR BLD AUTO: 62.9 %
OSMOLALITY SERPL CALC.SUM OF ELEC: 285 MOSM/KG (ref 275–295)
PLATELET # BLD AUTO: 226 10(3)UL (ref 150–450)
POTASSIUM SERPL-SCNC: 4.3 MMOL/L (ref 3.5–5.1)
PROT SERPL-MCNC: 6.8 G/DL (ref 6.4–8.2)
PROTHROMBIN TIME: 16.2 SECONDS (ref 12.2–14.5)
RBC # BLD AUTO: 4.63 X10(6)UL
SARS-COV-2 RNA RESP QL NAA+PROBE: NOT DETECTED
SODIUM SERPL-SCNC: 138 MMOL/L (ref 136–145)
WBC # BLD AUTO: 11.9 X10(3) UL (ref 4–11)

## 2021-09-15 PROCEDURE — 99214 OFFICE O/P EST MOD 30 MIN: CPT | Performed by: FAMILY MEDICINE

## 2021-09-15 PROCEDURE — 73630 X-RAY EXAM OF FOOT: CPT | Performed by: EMERGENCY MEDICINE

## 2021-09-15 PROCEDURE — 99223 1ST HOSP IP/OBS HIGH 75: CPT | Performed by: HOSPITALIST

## 2021-09-15 RX ORDER — CEFAZOLIN SODIUM/WATER 2 G/20 ML
2 SYRINGE (ML) INTRAVENOUS ONCE
Status: COMPLETED | OUTPATIENT
Start: 2021-09-15 | End: 2021-09-15

## 2021-09-15 RX ORDER — METOCLOPRAMIDE HYDROCHLORIDE 5 MG/ML
10 INJECTION INTRAMUSCULAR; INTRAVENOUS EVERY 8 HOURS PRN
Status: DISCONTINUED | OUTPATIENT
Start: 2021-09-15 | End: 2021-09-20

## 2021-09-15 RX ORDER — FUROSEMIDE 20 MG/1
20 TABLET ORAL DAILY
Status: DISCONTINUED | OUTPATIENT
Start: 2021-09-16 | End: 2021-09-20

## 2021-09-15 RX ORDER — SODIUM PHOSPHATE, DIBASIC AND SODIUM PHOSPHATE, MONOBASIC 7; 19 G/133ML; G/133ML
1 ENEMA RECTAL ONCE AS NEEDED
Status: DISCONTINUED | OUTPATIENT
Start: 2021-09-15 | End: 2021-09-20

## 2021-09-15 RX ORDER — CEFAZOLIN SODIUM/WATER 2 G/20 ML
2 SYRINGE (ML) INTRAVENOUS EVERY 8 HOURS
Status: DISCONTINUED | OUTPATIENT
Start: 2021-09-16 | End: 2021-09-20

## 2021-09-15 RX ORDER — ONDANSETRON 2 MG/ML
4 INJECTION INTRAMUSCULAR; INTRAVENOUS EVERY 6 HOURS PRN
Status: DISCONTINUED | OUTPATIENT
Start: 2021-09-15 | End: 2021-09-20

## 2021-09-15 RX ORDER — MELATONIN
3 NIGHTLY PRN
Status: DISCONTINUED | OUTPATIENT
Start: 2021-09-15 | End: 2021-09-20

## 2021-09-15 RX ORDER — HYDROMORPHONE HYDROCHLORIDE 1 MG/ML
1 INJECTION, SOLUTION INTRAMUSCULAR; INTRAVENOUS; SUBCUTANEOUS ONCE
Status: COMPLETED | OUTPATIENT
Start: 2021-09-15 | End: 2021-09-15

## 2021-09-15 RX ORDER — BISACODYL 10 MG
10 SUPPOSITORY, RECTAL RECTAL
Status: DISCONTINUED | OUTPATIENT
Start: 2021-09-15 | End: 2021-09-20

## 2021-09-15 RX ORDER — HYDROCODONE BITARTRATE AND ACETAMINOPHEN 5; 325 MG/1; MG/1
2 TABLET ORAL EVERY 4 HOURS PRN
Status: DISCONTINUED | OUTPATIENT
Start: 2021-09-15 | End: 2021-09-20

## 2021-09-15 RX ORDER — ATORVASTATIN CALCIUM 40 MG/1
40 TABLET, FILM COATED ORAL NIGHTLY
Status: DISCONTINUED | OUTPATIENT
Start: 2021-09-15 | End: 2021-09-20

## 2021-09-15 RX ORDER — AMIODARONE HYDROCHLORIDE 200 MG/1
200 TABLET ORAL DAILY
Status: DISCONTINUED | OUTPATIENT
Start: 2021-09-16 | End: 2021-09-20

## 2021-09-15 RX ORDER — LOSARTAN POTASSIUM 25 MG/1
12.5 TABLET ORAL DAILY
Status: DISCONTINUED | OUTPATIENT
Start: 2021-09-16 | End: 2021-09-20

## 2021-09-15 RX ORDER — AMOXICILLIN AND CLAVULANATE POTASSIUM 875; 125 MG/1; MG/1
1 TABLET, FILM COATED ORAL 2 TIMES DAILY
COMMUNITY
Start: 2021-09-10 | End: 2021-09-20

## 2021-09-15 RX ORDER — ACETAMINOPHEN 325 MG/1
650 TABLET ORAL EVERY 4 HOURS PRN
Status: DISCONTINUED | OUTPATIENT
Start: 2021-09-15 | End: 2021-09-20

## 2021-09-15 RX ORDER — HYDROCODONE BITARTRATE AND ACETAMINOPHEN 5; 325 MG/1; MG/1
1 TABLET ORAL EVERY 4 HOURS PRN
Status: DISCONTINUED | OUTPATIENT
Start: 2021-09-15 | End: 2021-09-20

## 2021-09-15 RX ORDER — POLYETHYLENE GLYCOL 3350 17 G/17G
17 POWDER, FOR SOLUTION ORAL DAILY PRN
Status: DISCONTINUED | OUTPATIENT
Start: 2021-09-15 | End: 2021-09-20

## 2021-09-15 NOTE — ED PROVIDER NOTES
Patient Seen in: [facility] Emergency Department      History   Patient presents with: Other    Stated Complaint: brusing    Subjective:   HPI    61-year-old white male presents emergent today for complaint of foot pain.   Patient reports that his f Ventricular arrhythmia    • Visual impairment               No pertinent past surgical history.               Social History    Tobacco Use      Smoking status: Former Smoker        Packs/day: 3.00        Years: 12.00        Pack years: 36        Types: Cig PANEL (14) - Abnormal; Notable for the following components:       Result Value    AST 50 (*)     Albumin 3.2 (*)     A/G Ratio 0.9 (*)     All other components within normal limits   PROTHROMBIN TIME (PT) - Abnormal; Notable for the following components: foot.  Patient may have resistant cellulitis as he has already been on antibiotics for couple days now. Patient be placed on IV antibiotics to be admitted to the hospital.  [facility] and evaluate the patient.   Pa

## 2021-09-16 ENCOUNTER — APPOINTMENT (OUTPATIENT)
Dept: CT IMAGING | Facility: HOSPITAL | Age: 71
DRG: 580 | End: 2021-09-16
Attending: HOSPITALIST
Payer: MEDICARE

## 2021-09-16 ENCOUNTER — APPOINTMENT (OUTPATIENT)
Dept: ULTRASOUND IMAGING | Facility: HOSPITAL | Age: 71
DRG: 580 | End: 2021-09-16
Attending: HOSPITALIST
Payer: MEDICARE

## 2021-09-16 ENCOUNTER — APPOINTMENT (OUTPATIENT)
Dept: GENERAL RADIOLOGY | Facility: HOSPITAL | Age: 71
DRG: 580 | End: 2021-09-16
Attending: HOSPITALIST
Payer: MEDICARE

## 2021-09-16 LAB
ANION GAP SERPL CALC-SCNC: 4 MMOL/L (ref 0–18)
BASOPHILS # BLD AUTO: 0.06 X10(3) UL (ref 0–0.2)
BASOPHILS NFR BLD AUTO: 0.5 %
BUN BLD-MCNC: 14 MG/DL (ref 7–18)
CALCIUM BLD-MCNC: 8.6 MG/DL (ref 8.5–10.1)
CHLORIDE SERPL-SCNC: 110 MMOL/L (ref 98–112)
CO2 SERPL-SCNC: 25 MMOL/L (ref 21–32)
CREAT BLD-MCNC: 0.81 MG/DL
EOSINOPHIL # BLD AUTO: 0.71 X10(3) UL (ref 0–0.7)
EOSINOPHIL NFR BLD AUTO: 6.4 %
ERYTHROCYTE [DISTWIDTH] IN BLOOD BY AUTOMATED COUNT: 14.5 %
GLUCOSE BLD-MCNC: 81 MG/DL (ref 70–99)
HCT VFR BLD AUTO: 41.9 %
HGB BLD-MCNC: 13.4 G/DL
IMM GRANULOCYTES # BLD AUTO: 0.17 X10(3) UL (ref 0–1)
IMM GRANULOCYTES NFR BLD: 1.5 %
LYMPHOCYTES # BLD AUTO: 1.82 X10(3) UL (ref 1–4)
LYMPHOCYTES NFR BLD AUTO: 16.3 %
MCH RBC QN AUTO: 30.7 PG (ref 26–34)
MCHC RBC AUTO-ENTMCNC: 32 G/DL (ref 31–37)
MCV RBC AUTO: 96.1 FL
MONOCYTES # BLD AUTO: 1.86 X10(3) UL (ref 0.1–1)
MONOCYTES NFR BLD AUTO: 16.7 %
NEUTROPHILS # BLD AUTO: 6.53 X10 (3) UL (ref 1.5–7.7)
NEUTROPHILS # BLD AUTO: 6.53 X10(3) UL (ref 1.5–7.7)
NEUTROPHILS NFR BLD AUTO: 58.6 %
OSMOLALITY SERPL CALC.SUM OF ELEC: 288 MOSM/KG (ref 275–295)
PLATELET # BLD AUTO: 207 10(3)UL (ref 150–450)
POTASSIUM SERPL-SCNC: 3.7 MMOL/L (ref 3.5–5.1)
RBC # BLD AUTO: 4.36 X10(6)UL
SODIUM SERPL-SCNC: 139 MMOL/L (ref 136–145)
WBC # BLD AUTO: 11.2 X10(3) UL (ref 4–11)

## 2021-09-16 PROCEDURE — 99223 1ST HOSP IP/OBS HIGH 75: CPT | Performed by: STUDENT IN AN ORGANIZED HEALTH CARE EDUCATION/TRAINING PROGRAM

## 2021-09-16 PROCEDURE — 73130 X-RAY EXAM OF HAND: CPT | Performed by: HOSPITALIST

## 2021-09-16 PROCEDURE — 93971 EXTREMITY STUDY: CPT | Performed by: HOSPITALIST

## 2021-09-16 PROCEDURE — 99232 SBSQ HOSP IP/OBS MODERATE 35: CPT | Performed by: HOSPITALIST

## 2021-09-16 PROCEDURE — 73701 CT LOWER EXTREMITY W/DYE: CPT | Performed by: HOSPITALIST

## 2021-09-16 NOTE — PLAN OF CARE
Patient received via stretcher from ER, alert and oriented x 4, per patient MS causes memory issues, lungs clear on room air, abdomen soft, bowel sounds present, voiding via urinal, skin intact, significant bruising, redness and edema noted to right foot-e

## 2021-09-16 NOTE — H&P
[facility] HOSPITALIST  History and Physical     Dustin Gregor Patient Status:  Inpatient    10/16/1950 MRN [id number]   [facility] 4NW-A Attending Kai Tobar MD   Hosp Day # 0 PCP Karl Forrest MD     Chief Complaint: foot swelling Pulmonary vascular congestion     Bilateral anterior thigh-area pain.    • Pure hypercholesterolemia 6/29/2012   • Spinal stenosis    • Unspecified sleep apnea     wears CPAP   • Unspecified urinary incontinence    • Ventricular arrhythmia    • Visual impai MANAGEMENT   • PATIENT DOCUMENTED NOT TO HAVE EXPERIENCED ANY OF THE FOLLOWING EVENTS  7/31/2013    Procedure: TRANSFORAMINAL EPIDURAL - LUMBAR;  Surgeon: Mirella Egan MD;  Location: [facility] FOR PAIN MANAGEMENT   • PATIENT DOCUMENTED NOT TO HAVE EXPE metoprolol tartrate 25 MG Oral Tab, Take 1 tablet (25 mg total) by mouth 2 (two) times daily. , Disp: 60 tablet, Rfl: 0  Losartan Potassium 25 MG Oral Tab, Take 12.5 mg by mouth daily. , Disp: , Rfl:   atorvastatin 40 MG Oral Tab, Take 40 mg by mouth night deformity. Abdomen: Soft, nontender, nondistended. Positive bowel sounds. No rebound, guarding or organomegaly. Neurologic: No focal neurological deficits. CNII-XII grossly intact. Musculoskeletal: Moves all extremities. Extremities: No cyanosis.   Rig midnight's based on the clinical documentation in H+P. Based on patients current state of illness, I anticipate that, after discharge, patient will require TBD.

## 2021-09-16 NOTE — PROGRESS NOTES
[facility] Pharmacy Note: Antimicrobial Weight Based Dose Adjustment for: cefazolin (ANCEF)    Lizbeth Mccabe is a 65year old patient who has been prescribed cefazolin (ANCEF) 1 g.    Estimated Creatinine Clearance: 72.6 mL/min (based on SCr of 0.92 mg/dL).   Wt R

## 2021-09-16 NOTE — CONSULTS
INFECTIOUS DISEASE CONSULT NOTE    Tiki Monroy Patient Status:  Inpatient    10/16/1950 MRN [id number]   [facility] 4NW-A Attending Rakan Tiwari MD   [address] Day # 1 PCP Lili Álvarez Pulmonary vascular congestion     Bilateral anterior thigh-area pain.    • Pure hypercholesterolemia 6/29/2012   • Spinal stenosis    • Unspecified sleep apnea     wears CPAP   • Unspecified urinary incontinence    • Ventricular arrhythmia    • Visual impai NOT TO HAVE EXPERIENCED ANY OF THE FOLLOWING EVENTS  7/31/2013    Procedure: TRANSFORAMINAL EPIDURAL - LUMBAR;  Surgeon: Armen Alejandre MD;  Location: [address] [place] [address]   • PATIENT DOCUMENTED NOT TO HAVE EXPERIENCED ANY OF THE FOLLOWING EVENT tab 25 mg, 25 mg, Oral, 2x Daily(Beta Blocker)  •  acetaminophen (TYLENOL) tab 650 mg, 650 mg, Oral, Q4H PRN **OR** HYDROcodone-acetaminophen (NORCO) 5-325 MG per tab 1 tablet, 1 tablet, Oral, Q4H PRN **OR** HYDROcodone-acetaminophen (NORCO) 5-325 MG per t No thrush  Neck: No lymphadenopathy. Supple. Respiratory: Clear to auscultation bilaterally. No wheezes. No rhonchi. Cardiovascular: S1, S2.  Regular rate and rhythm. No murmurs. Abdomen: Soft, nontender, nondistended. Positive bowel sounds.   Muscul calcaneal spur. Marked dorsal soft tissue swelling over the midfoot. CONCLUSION:  Dorsal soft tissue swelling. No acute osseous abnormality.    Dictated by (CST): Alex Beck MD on 9/15/2021 at 6:28 PM     Finalized by (CST): Alex Beck MD does have erythema with progressive symptoms over the past couple of weeks, ? Infected hematoma  2. RLE/ foot hematoma  3. MS  4.  Afib on anticoagulation    PLAN:  - cont ancef  - await podiatry eval, may need to aspirate hematoma and send for cx to r/o in

## 2021-09-16 NOTE — HOME CARE LIAISON
This patient is current with [facility]. Patient will need a NORIS order on or before the day of DC.  Services: RN/PT/OT

## 2021-09-16 NOTE — PROGRESS NOTES
Lizbeth Mccabe is a 65year old male. cc follow-up right lower extremity bruising  HPI:   Patient is coming to the office for follow-up from visit from September 10, 2021.   At that time patient presented with bruising which started week prior of the right le by mouth daily. • Ascorbic Acid ER 1000 MG Oral Tab CR Take 1 tablet by mouth daily. • Cholecalciferol (VITAMIN D OR) Take 4,000 Units by mouth daily. • amoxicillin clavulanate 875-125 MG Oral Tab Take 1 tablet by mouth 2 (two) times daily. use: No       REVIEW OF SYSTEMS:   GENERAL HEALTH: feels well otherwise  SKIN:bruises of the dorsal aspect of the hand seems to be fading also bruise on the left torso  RESPIRATORY: denies shortness of breath with exertion  CARDIOVASCULAR: denies chest bambi despite of starting the Augmentin antibiotic. Patient is multiple comorbidities including A. fib he is on anticoagulation, having coronary artery disease and multiple sclerosis.   At this point since patient failed conservative outpatient management will h

## 2021-09-16 NOTE — PROGRESS NOTES
[facility]     Hospitalist Progress Note     Yenny Santana Patient Status:  Inpatient    10/16/1950 MRN [id number]   [facility] 4NW-A Attending Maury Garcia MD   Hosp Day # 1 PCP Ying Caraballo MD     Chief Complaint: Cellulitis hours. Cardiac  No results for input(s): TROP, PBNP in the last 168 hours. Creatinine Kinase  No results for input(s): CK in the last 168 hours. Inflammatory Markers  No results for input(s): CRP, KARI, LDH, DDIMER in the last 168 hours.     Imaging

## 2021-09-16 NOTE — CM/SW NOTE
09/16/21 1400   CM/SW Referral Data   Referral Source Physician   Reason for Referral Discharge planning   Informant Patient   Patient Info   Patient's Current Mental Status at Time of Assessment Alert; Oriented   Patient's [address]   Numb

## 2021-09-16 NOTE — PLAN OF CARE
Pt AOx4. VSS. Resting in bed with leg elevated. Intact but swollen and bruised appearing. Podiatry and ID consulted. Xray of hand done and US of leg this afternoon.

## 2021-09-17 PROCEDURE — 99232 SBSQ HOSP IP/OBS MODERATE 35: CPT | Performed by: HOSPITALIST

## 2021-09-17 PROCEDURE — 10140 I&D HMTMA SEROMA/FLUID COLLJ: CPT | Performed by: STUDENT IN AN ORGANIZED HEALTH CARE EDUCATION/TRAINING PROGRAM

## 2021-09-17 PROCEDURE — 0J9Q0ZZ DRAINAGE OF RIGHT FOOT SUBCUTANEOUS TISSUE AND FASCIA, OPEN APPROACH: ICD-10-PCS | Performed by: STUDENT IN AN ORGANIZED HEALTH CARE EDUCATION/TRAINING PROGRAM

## 2021-09-17 NOTE — PHYSICAL THERAPY NOTE
PHYSICAL THERAPY EVALUATION - INPATIENT     Room Number: 404/404-A  Evaluation Date: 9/17/2021  Type of Evaluation: Initial  Physician Order: PT Eval and Treat    Presenting Problem: hematoma or R foot, cellulitis or RLE, R foot pain   Reason for The arrhythmia    • Visual impairment        Past Surgical History  Past Surgical History:   Procedure Laterality Date   • APPENDECTOMY  1991   • CABG  1998   • CARDIAC DEFIBRILLATOR PLACEMENT      [employer], not pacer dependent.   Magnet response inhib NOT TO HAVE EXPERIENCED ANY OF THE FOLLOWING EVENTS  8/14/2013    Procedure: LUMBAR EPIDURAL;  Surgeon: Marco Bear MD;  Location: [facility] FOR PAIN MANAGEMENT   • PATIENT [address] FOR IV ANTIBIOTIC SURGICAL SITE INFECTION Srinivas Braden WFL - within functional limits  · Memory:  decreased short term memory  · Following Commands:  follows one step commands without difficulty and follows multistep commands with increased time  · Motor Planning: intact  · Safety Judgement:  good awareness of walker  Pattern: R Decreased stance time          Skilled Therapy Provided: Pt received in bed, semi-supine. Pt agreeable to PT session.  Pt very talkative, pleasant, occasional lapses in short term memory but able to recall events and names with assist. themselves as functional limitations in independent bed mobility, transfers, and gait. The patient is below baseline and would benefit from skilled inpatient PT to address the above deficits to assist patient in returning to prior to level of function.   D

## 2021-09-17 NOTE — PROGRESS NOTES
Awake , alert , oriented , afebrile ,, respirations easy , appetite fair, redness of RLE , foot shows decreasing area of redness , reddened area previously circled, Mediacted as per request for RLE discomfort DR Olvera at bedside for bedside I&D , culture s

## 2021-09-17 NOTE — PROGRESS NOTES
[facility]    Progress Note    David Nguyễn Patient Status:  Inpatient    10/16/1950 MRN [id number]   [facility] 4NW-A Attending Orlin Rodriguez MD   Hosp Day # 2 PCP Reinier Bo MD     Date of Admission:  9/15/2021    History o • CARDIAC DEFIBRILLATOR PLACEMENT      boston scientific, not pacer dependent.   Magnet response inhibit therapy   • COLONOSCOPY  1/2016    Dr Gato uW due in 5 years   • JUDITH LOPEZ & Carolin Garza NDL/CATH SPI DX/[facility]  7/31/2013    Procedure: TRANSFORAMINAL EPID PAIN MANAGEMENT   • PATIENT [place] PREOPERATIVE ORDER FOR IV ANTIBIOTIC SURGICAL SITE INFECTION PROPHYLAXIS.  7/12/2013    Procedure: TRANSFORAMINAL EPIDURAL - LUMBAR;  Surgeon: Jenny Frost MD;  Location: [address] [place] [address]   • PATIENT [place] PRN  •  PEG 3350 (MIRALAX) powder packet 17 g, 17 g, Oral, Daily PRN  •  magnesium hydroxide (MILK OF MAGNESIA) 400 MG/5ML suspension 30 mL, 30 mL, Oral, Daily PRN  •  bisacodyl (DULCOLAX) rectal suppository 10 mg, 10 mg, Rectal, Daily PRN  •  Fleet Enema at 2:33 PM     Finalized by (CST): Bhumika Monk MD on 9/16/2021 at 2:36 PM       CT FOOT RIGHT(CONTRAST ONLY) (CPT=73701)    Result Date: 9/16/2021  CONCLUSION:  Dorsal soft tissue mass that is high density adjacent to the 2nd and 3rd metatarsal bones may re congestion     Pre-operative cardiovascular examination     Long term (current) use of anticoagulants     Atrial fibrillation with normal ventricular rate (HCC)     Hyperlipidemia     S/P implantation of automatic cardioverter/defibrillator (AICD)     Ashutoshe blade was used to make a small 2 cm incision over the midfoot over the third interspace. Upon making the incision, a hemostat was used to free surrounding hematoma at this time.   Significant amounts of sanguinous drainage and coagulated blood were removed

## 2021-09-17 NOTE — PROGRESS NOTES
INFECTIOUS DISEASE PROGRESS NOTE    Dwyane Minor Patient Status:  Inpatient    10/16/1950 MRN [id number]   [facility] 4NW-A Attending Randall Aschoff, MD   Hosp Day # 2 PCP Olya Monk temperature 97.8 °F (36.6 °C), temperature source Oral, resp. rate 18, height 5' 9\" (1.753 m), weight 247 lb (112 kg), SpO2 95 %. HEENT: no scleral icterus or conjunctival injection. Moist mucous membranes. No thrush  Neck: No lymphadenopathy. Supple. and RN    Jay Rios. Frank Vazquez PA-C    ID ATTENDING ADDENDUM     Pt seen an examined independently. Chart reviewed. Agree with above. Note has been reviewed by me and modified as needed. Exam and Impression/ Recs as noted above. D/w staff and with pt.

## 2021-09-17 NOTE — CONSULTS
[facility]    Report of Consultation    Garcia Vee Patient Status:  Inpatient    10/16/1950 MRN [id number]   [facility] 4NW-A Attending Inocente Homans, MD   Hosp Day # 1 PCP Hollie Wood MD     Date of Admission:  9/15/2021  Da or obstruction 6/29/2012   • Personal history of methicillin resistant Staphylococcus aureus    • Personal history of other diseases of circulatory system    • Pre-operative cardiovascular examination    • Pulmonary vascular congestion     Bilateral anteri NOT TO HAVE EXPERIENCED ANY OF THE FOLLOWING EVENTS  7/12/2013    Procedure: TRANSFORAMINAL EPIDURAL - LUMBAR;  Surgeon: Tania Rivrea MD;  Location: [address] [place] [address]   • PATIENT DOCUMENTED NOT TO HAVE EXPERIENCED ANY OF THE FOLLOWING EVENT mg, Oral, BID  •  atorvastatin (LIPITOR) tab 40 mg, 40 mg, Oral, Nightly  •  furosemide (LASIX) tab 20 mg, 20 mg, Oral, Daily  •  Losartan Potassium (COZAAR) tab 12.5 mg, 12.5 mg, Oral, Daily  •  metoprolol tartrate (LOPRESSOR) tab 25 mg, 25 mg, Oral, 2x D (CUY=09247)    Result Date: 9/15/2021  CONCLUSION:  Dorsal soft tissue swelling. No acute osseous abnormality.    Dictated by (CST): Carmine Martinez MD on 9/15/2021 at 6:28 PM     Finalized by (CST): Carmine Martinez MD on 9/15/2021 at 6:29 PM       XR HAND (MI esophagitis     Pure hypercholesterolemia     Multiple sclerosis (HCC)     Peptic ulcer, unspecified site, unspecified as acute or chronic, without mention of hemorrhage, perforation, or obstruction     Jo's esophagus     Coronary atherosclerosis trauma, but does have significant neuropathy to lower extremities and has been on blood thinners, which could possibly lead to hematoma formation if inadvertent trauma occurred.   -CT with contrast shows Dorsal soft tissue mass that measures approximately a

## 2021-09-17 NOTE — PROGRESS NOTES
[facility]     Hospitalist Progress Note     Susie Ramon Patient Status:  Inpatient    10/16/1950 MRN [id number]   [facility] 4NW-A Attending Long Burris MD   Hosp Day # 2 PCP Chet Wylie MD     Chief Complaint: Cellulitis hours. Cardiac  No results for input(s): TROP, PBNP in the last 168 hours. Creatinine Kinase  No results for input(s): CK in the last 168 hours. Inflammatory Markers  No results for input(s): CRP, KARI, LDH, DDIMER in the last 168 hours.     Imaging

## 2021-09-18 PROCEDURE — 99232 SBSQ HOSP IP/OBS MODERATE 35: CPT | Performed by: HOSPITALIST

## 2021-09-18 PROCEDURE — 99024 POSTOP FOLLOW-UP VISIT: CPT | Performed by: STUDENT IN AN ORGANIZED HEALTH CARE EDUCATION/TRAINING PROGRAM

## 2021-09-18 NOTE — PROGRESS NOTES
[facility]     Hospitalist Progress Note     Margaret Middleton Patient Status:  Inpatient    10/16/1950 MRN [id number]   [facility] 4NW-A Attending Lilly Atkinson MD   Hosp Day # 3 PCP Robert Cardoza MD     Chief Complaint: Cellulitis CK in the last 168 hours. Inflammatory Markers  No results for input(s): CRP, KARI, LDH, DDIMER in the last 168 hours. Imaging: Imaging data reviewed in Epic.     Medications:   • amiodarone  200 mg Oral Daily   • [Held by provider] apixaban  5 mg Oral

## 2021-09-18 NOTE — PROGRESS NOTES
Awake , alert , states no short term ,memory and often time difficulty finding words , attentive , appropriate, HX MS, states transient sharp pain to Right foot , though pain less severe since I&D  Of yesterday , DR Olvera in , dressing and wound care per Rozina Pry

## 2021-09-18 NOTE — CM/SW NOTE
SW received an order for dc planning. RN stating the pt will not dc today. RN stating the pt is very unsteady and will most likely need to go to a [facility]. J[facility] referrals sent via [facility]. Will follow up in regards to a dc plan.

## 2021-09-18 NOTE — PROGRESS NOTES
INFECTIOUS DISEASE PROGRESS NOTE    Margaret Graver Patient Status:  Inpatient    10/16/1950 MRN [id number]   [facility] 4NW-A Attending Lilly Atkinson MD   [facility] Day # 3 DAVINA Soto pulse 63, temperature 97.9 °F (36.6 °C), temperature source Axillary, resp. rate 19, height 5' 9\" (1.753 m), weight 247 lb (112 kg), SpO2 96 %. HEENT: no scleral icterus or conjunctival injection. Moist mucous membranes.  No thrush  Neck: No lymphadenopat cont IV ancef  - follow cxs from I&D  - leg elevation as able  - follow temps and wbc  - monitor area clinically  - anticipate patient will be ok for dc in next 24-48hrs from ID standpoint, if foot continues to improve.     Case and plan d/w pt, RN, Dr. Ida Conklin

## 2021-09-18 NOTE — PROGRESS NOTES
[facility]    Progress Note    Destiny Marcum Patient Status:  Inpatient    10/16/1950 MRN [id number]   [facility] 4NW-A Attending Nisa Sher MD   Hosp Day # 3 PCP Ash Palacios MD     Date of Admission:  9/15/2021    History o APPENDECTOMY  1991   • CABG  1998   • CARDIAC DEFIBRILLATOR PLACEMENT      boston scientific, not pacer dependent.   Magnet response inhibit therapy   • COLONOSCOPY  1/2016    Dr Ziggy Heath due in 5 years   • JUDITH LOPEZ & Renetta Cabrera NDL/CATH SPI DX/THER [facility]  7/31/2013 Isaac Mccauley MD;  Location: [facility] CENTER FOR PAIN MANAGEMENT   • PATIENT [facility] PREOPERATIVE ORDER FOR IV ANTIBIOTIC SURGICAL SITE INFECTION PROPHYLAXIS.  7/12/2013    Procedure: TRANSFORAMINAL EPIDURAL - LUMBAR;  Surgeon: Wendy Buitrago MD;  Location: [facility] DEEDEE melatonin tab 3 mg, 3 mg, Oral, Nightly PRN  •  PEG 3350 (MIRALAX) powder packet 17 g, 17 g, Oral, Daily PRN  •  magnesium hydroxide (MILK OF MAGNESIA) 400 MG/5ML suspension 30 mL, 30 mL, Oral, Daily PRN  •  bisacodyl (DULCOLAX) rectal suppository 10 mg, 1 2:33 PM     Finalized by (CST): Virgen Gee MD on 9/16/2021 at 2:36 PM       CT FOOT RIGHT(CONTRAST ONLY) (CPT=73701)    Result Date: 9/16/2021  CONCLUSION:  Dorsal soft tissue mass that is high density adjacent to the 2nd and 3rd metatarsal bones may repre congestion     Pre-operative cardiovascular examination     Long term (current) use of anticoagulants     Atrial fibrillation with normal ventricular rate (HCC)     Hyperlipidemia     S/P implantation of automatic cardioverter/defibrillator (AICD)     Ashutoshe follow--please reach out with questions or concerns at [phone].       Elmerella SquibbMALENA

## 2021-09-19 PROCEDURE — 99024 POSTOP FOLLOW-UP VISIT: CPT | Performed by: STUDENT IN AN ORGANIZED HEALTH CARE EDUCATION/TRAINING PROGRAM

## 2021-09-19 PROCEDURE — 99232 SBSQ HOSP IP/OBS MODERATE 35: CPT | Performed by: HOSPITALIST

## 2021-09-19 NOTE — PLAN OF CARE
Problem: right lower extremity cellulitis  Data: Patient alert and oriented overnight, on room air while awake and wearing cpap with sleep. Patient reports minimal pain to right foot, declining pain medication at this time.  Patient's ace wrap to right foot physical needs  - Identify cognitive and physical deficits and behaviors that affect risk of falls.   - [place] fall precautions as indicated by assessment.  - Educate pt/family on patient safety including physical limitations  - Instruct pt to call for a

## 2021-09-19 NOTE — PLAN OF CARE
Pt alert/oriented x 4. S/P incision and drainage done on 9/17 to right foot. Ace wrap dressing/gauze intact and dry. Toes exposed discolored, warm to touch, able to wiggle toes. Swelling noted to right foot.   Eliquis given this morning, ok'd by Dr. Rodger Zimmerman RISK FOR INFECTION - ADULT  Goal: Absence of fever/infection during anticipated neutropenic period  Description: INTERVENTIONS  - Monitor WBC  - Administer growth factors as ordered  - Implement neutropenic guidelines  Outcome: Progressing     Problem: SAF sites and surrounding tissue  - Implement wound care per orders  - Initiate isolation precautions as appropriate  - Initiate Pressure Ulcer prevention bundle as indicated  Outcome: Not Progressing

## 2021-09-19 NOTE — PROGRESS NOTES
[facility]     Hospitalist Progress Note     Heather Olson Patient Status:  Inpatient    10/16/1950 MRN [id number]   [facility] 4NW-A Attending Teressa Lopez MD   Hosp Day # 4 PCP Toy Canales MD     Chief Complaint: Cellulitis hours.    Inflammatory Markers  No results for input(s): CRP, KARI, LDH, DDIMER in the last 168 hours. Imaging: Imaging data reviewed in Epic.     Medications:   • amiodarone  200 mg Oral Daily   • apixaban  5 mg Oral BID   • atorvastatin  40 mg Oral Nigh

## 2021-09-19 NOTE — PROGRESS NOTES
Occupational Therapy    Orders received and chart review completed.  Attempted to see Pt this x2 today for skilled OT eval, once in AM and once in PM. On initial attempt, Pt had just gotten back to bed with PCT d/t increased pain and feeling of swelling in

## 2021-09-19 NOTE — PROGRESS NOTES
[facility]    Progress Note    Rosey Dillon Patient Status:  Inpatient    10/16/1950 MRN [id number]   [facility] 4NW-A Attending Merritt Corrigan MD   [address] Day # 4 PCP Brent Jean Baptiste MD     Date of Admission:  9/15/2021    History o Date   • APPENDECTOMY  1991   • CABG  1998   • CARDIAC DEFIBRILLATOR PLACEMENT      b[employer] scientific, not pacer dependent.   Magnet response inhibit therapy   • COLONOSCOPY  1/2016    Dr Carmen Daniels due in 5 years   • [address] NDL/CATH SPI DX/THER [facility] [address] Arjun Kirkland MD;  Location: [facility] FOR PAIN MANAGEMENT   • PATIENT [address] FOR IV ANTIBIOTIC SURGICAL SITE INFECTION PROPHYLAXIS.  7/12/2013    Procedure: TRANSFORAMINAL EPIDURAL - LUMBAR;  Surgeon: Arjun Kirkland MD;  Location: tab 3 mg, 3 mg, Oral, Nightly PRN    •  PEG 3350 (MIRALAX) powder packet 17 g, 17 g, Oral, Daily PRN  •  magnesium hydroxide (MILK OF MAGNESIA) 400 MG/5ML suspension 30 mL, 30 mL, Oral, Daily PRN  •  bisacodyl (DULCOLAX) rectal suppository 10 mg, 10 mg, Re 9/16/2021 at 3:51 PM          Laboratory Data:  Recent Labs   Lab 09/16/21  0612   RBC 4.36   HGB 13.4   HCT 41.9   MCV 96.1   MCH 30.7   MCHC 32.0   RDW 14.5   NEPRELIM 6.53   WBC 11.2*   .0       Impression and Plan:  Patient Active Problem List: obstruction     Troponin level elevated     Weakness generalized     Intractable back pain     Cellulitis of right lower extremity     Nodule of skin of left hand     Hematoma of right foot      -Patient examined, chart history reviewed.   -Patient is afebr

## 2021-09-20 VITALS
HEIGHT: 69 IN | HEART RATE: 73 BPM | SYSTOLIC BLOOD PRESSURE: 129 MMHG | TEMPERATURE: 98 F | RESPIRATION RATE: 18 BRPM | BODY MASS INDEX: 34.63 KG/M2 | WEIGHT: 233.81 LBS | OXYGEN SATURATION: 100 % | DIASTOLIC BLOOD PRESSURE: 68 MMHG

## 2021-09-20 LAB
ANION GAP SERPL CALC-SCNC: 5 MMOL/L (ref 0–18)
BASOPHILS # BLD AUTO: 0.12 X10(3) UL (ref 0–0.2)
BASOPHILS NFR BLD AUTO: 1.1 %
BUN BLD-MCNC: 12 MG/DL (ref 7–18)
CALCIUM BLD-MCNC: 9 MG/DL (ref 8.5–10.1)
CHLORIDE SERPL-SCNC: 107 MMOL/L (ref 98–112)
CO2 SERPL-SCNC: 26 MMOL/L (ref 21–32)
CREAT BLD-MCNC: 0.89 MG/DL
EOSINOPHIL # BLD AUTO: 0.57 X10(3) UL (ref 0–0.7)
EOSINOPHIL NFR BLD AUTO: 5.1 %
ERYTHROCYTE [DISTWIDTH] IN BLOOD BY AUTOMATED COUNT: 14.5 %
GLUCOSE BLD-MCNC: 80 MG/DL (ref 70–99)
HCT VFR BLD AUTO: 43.3 %
HGB BLD-MCNC: 14.2 G/DL
IMM GRANULOCYTES # BLD AUTO: 0.17 X10(3) UL (ref 0–1)
IMM GRANULOCYTES NFR BLD: 1.5 %
LYMPHOCYTES # BLD AUTO: 2.07 X10(3) UL (ref 1–4)
LYMPHOCYTES NFR BLD AUTO: 18.6 %
MCH RBC QN AUTO: 31.8 PG (ref 26–34)
MCHC RBC AUTO-ENTMCNC: 32.8 G/DL (ref 31–37)
MCV RBC AUTO: 96.9 FL
MONOCYTES # BLD AUTO: 1.44 X10(3) UL (ref 0.1–1)
MONOCYTES NFR BLD AUTO: 12.9 %
NEUTROPHILS # BLD AUTO: 6.75 X10 (3) UL (ref 1.5–7.7)
NEUTROPHILS # BLD AUTO: 6.75 X10(3) UL (ref 1.5–7.7)
NEUTROPHILS NFR BLD AUTO: 60.8 %
OSMOLALITY SERPL CALC.SUM OF ELEC: 285 MOSM/KG (ref 275–295)
PLATELET # BLD AUTO: 221 10(3)UL (ref 150–450)
POTASSIUM SERPL-SCNC: 3.6 MMOL/L (ref 3.5–5.1)
RBC # BLD AUTO: 4.47 X10(6)UL
SODIUM SERPL-SCNC: 138 MMOL/L (ref 136–145)
WBC # BLD AUTO: 11.1 X10(3) UL (ref 4–11)

## 2021-09-20 PROCEDURE — 99239 HOSP IP/OBS DSCHRG MGMT >30: CPT | Performed by: HOSPITALIST

## 2021-09-20 PROCEDURE — 99024 POSTOP FOLLOW-UP VISIT: CPT | Performed by: STUDENT IN AN ORGANIZED HEALTH CARE EDUCATION/TRAINING PROGRAM

## 2021-09-20 RX ORDER — CEFADROXIL 500 MG/1
500 CAPSULE ORAL 2 TIMES DAILY
Qty: 14 CAPSULE | Refills: 0 | Status: SHIPPED | OUTPATIENT
Start: 2021-09-20 | End: 2021-09-27

## 2021-09-20 NOTE — CM/SW NOTE
09/20/21 1600   Discharge disposition   Expected discharge disposition [address] [place] [facility] Provider [facility]   Discharge transportation Private car   RN stating the wife is driving the pt. CPAP setting sent to [facility].    [facility]

## 2021-09-20 NOTE — CM/SW NOTE
Sent updated clinicals to [facility]. Will provide pt w/a list of accepting JULIUS's in the event he decides to go to a [facility] at [place].

## 2021-09-20 NOTE — CM/SW NOTE
[facility] are asking for CPAP settings. Called RN and informed her of this. Pt can dc to [facility] as long as the have the CPAP settings. Will send them once they are available.  Pt's wife to drive pt to [facility]

## 2021-09-20 NOTE — PROGRESS NOTES
Up in chair most of day , wound care and dressing change per DR Olvera , appetite good , ambulates in room w/ use of walker , CPAP at night , room air during day , independantly dressed self for discharge , cleared by all services for discharge, report call

## 2021-09-20 NOTE — PLAN OF CARE
Patient is alert and oriented x4. RA/CPAP. Eliquis on hold per orders. Tolerating diet. Voids using the urinal with adequate urine output. No BM. C/o pain in right foot; PRN norco given-see MAR. Keeping right foot elevated. IV abx.  Ambulates with standby a

## 2021-09-20 NOTE — PROGRESS NOTES
INFECTIOUS DISEASE PROGRESS NOTE    Susana Allen Patient Status:  Inpatient    10/16/1950 MRN [id number]   [facility] 4NW-A Attending Frankie Padilla MD   [facility] Day # 5 PCP Javier Mace 97.8 °F (36.6 °C), temperature source Oral, resp. rate 18, height 5' 9\" (1.753 m), weight 233 lb 12.8 oz (106.1 kg), SpO2 94 %. HEENT: no scleral icterus or conjunctival injection. Moist mucous membranes. No thrush  Neck: No lymphadenopathy. Supple.   Re cindy.  3. MS  4. Afib on anticoagulation    PLAN:  - cont IV ancef  - leg elevation as able  - follow temps and wbc  - monitor area clinically  - ok for dc from ID standpoint with cefadroxil x 7 more days. Noted plans for SNF on dc.     Case and plan d/w p

## 2021-09-20 NOTE — PHYSICAL THERAPY NOTE
PHYSICAL THERAPY TREATMENT NOTE - INPATIENT    Room Number: 633/316-J     Session: 1   Number of Visits to Meet Established Goals: 4    Presenting Problem: hematoma or R foot, cellulitis or RLE, R foot pain     History related to current admission: Pt is Procedure Laterality Date   • APPENDECTOMY  1991   • CABG  1998   • CARDIAC DEFIBRILLATOR PLACEMENT      b[employer] scientific, not pacer dependent.   Magnet response inhibit therapy   • COLONOSCOPY  1/2016    Dr Javad Olivarez due in 5 years   • JUDITH LOPEZ & Danilo Cuello EPIDURAL;  Surgeon: Jenny Frost MD;  Location: [facility] FOR PAIN MANAGEMENT   • PATIENT [address] FOR IV ANTIBIOTIC SURGICAL SITE INFECTION PROPHYLAXIS.  7/12/2013    Procedure: TRANSFORAMINAL EPIDURAL - LUMBAR;  Surgeon: Giovanni Holland person does the patient currently need. ..   -   Moving to and from a bed to a chair (including a wheelchair)?: A Little   -   Need to walk in hospital room?: A Little   -   Climbing 3-5 steps with a railing?: Total       AM-PAC Score:  Raw Score: 16   Appr Recommendations: Sub-acute rehabilitation     PLAN  PT Treatment Plan: Bed mobility; Body mechanics;  Energy conservation; Patient education; Gait training; Range of motion; Strengthening; Stoop training; Stair training; Transfer training; Balance training

## 2021-09-20 NOTE — PROGRESS NOTES
09/19/21 2134   BiPAP   $ RT Standby Charge (per 15 min) 1   $ DON Follow up charge Yes   Device RemStar - CPAP   BiPAP / CPAP CE# D3   BiPAP bacteria filter Yes   Mode Spontaneous   Interface Nasal mask   Mask Size Medium   Control Settings   Max P 20

## 2021-09-20 NOTE — CM/SW NOTE
Spoke w/RN during report. She stated the pt stated he wanted to go back to [facility]. Sent updated clinicals. Reserved provider.

## 2021-09-20 NOTE — PROGRESS NOTES
[facility]     Hospitalist Progress Note     Jaylene Mack Patient Status:  Inpatient    10/16/1950 MRN [id number]   [facility] 4NW-A Attending Dedra Valdes MD   Hosp Day # 5 PCP Anders Coker MD     Chief Complaint: Cellulitis input(s): TROP, PBNP in the last 168 hours. Creatinine Kinase  No results for input(s): CK in the last 168 hours. Inflammatory Markers  No results for input(s): CRP, KARI, LDH, DDIMER in the last 168 hours. Imaging: Imaging data reviewed in Epic.

## 2021-09-20 NOTE — PROGRESS NOTES
[facility]    Progress Note    Nevin Magaña Patient Status:  Inpatient    10/16/1950 MRN [id number]   [facility] 4NW-A Attending Susan Medina MD   [facility] Day # 5 PCP Stephen Iyer MD     Date of Admission:  9/15/2021    History o DEFIBRILLATOR PLACEMENT      b[employer] scientific, not pacer dependent.   Magnet response inhibit therapy   • COLONOSCOPY  1/2016    Dr Roma Delgado due in 5 years   • JUDITH GISP & Diamond Ghotra NDL/CATH SPI DX/THER [facility]  7/31/2013    Procedure: TRANSFORAMINAL EPIDURAL - LUMBA MANAGEMENT   • PATIENT [place] PREOPERATIVE ORDER FOR IV ANTIBIOTIC SURGICAL SITE INFECTION PROPHYLAXIS.  7/12/2013    Procedure: TRANSFORAMINAL EPIDURAL - LUMBAR;  Surgeon: Essence Klein MD;  Location: [address] [place] [address]   • PATIENT [place] PRN  •  PEG 3350 (MIRALAX) powder packet 17 g, 17 g, Oral, Daily PRN  •  magnesium hydroxide (MILK OF MAGNESIA) 400 MG/5ML suspension 30 mL, 30 mL, Oral, Daily PRN  •  bisacodyl (DULCOLAX) rectal suppository 10 mg, 10 mg, Rectal, Daily PRN  •  Fleet Enema cuff (capsule) sprain     Osteoarthritis of shoulder     Adhesive capsulitis of shoulder     Pain in joint, lower leg     Spinal stenosis     Hip arthritis     Ventricular arrhythmia     Hypertension     Pulmonary vascular congestion     Pre-operative card compression/elevation until swelling has further resolved/pain is better controlled. -Patient should keep ambulation to a minimum while hematoma continues to resolve.   -Culture results currently showing mixed gram positive cindy.  -Patient receiving Ancef

## 2021-09-21 ENCOUNTER — NURSE ONLY (OUTPATIENT)
Dept: LAB | Age: 71
End: 2021-09-21
Attending: FAMILY MEDICINE
Payer: MEDICARE

## 2021-09-21 ENCOUNTER — EXTERNAL FACILITY (OUTPATIENT)
Dept: FAMILY MEDICINE CLINIC | Facility: CLINIC | Age: 71
End: 2021-09-21

## 2021-09-21 ENCOUNTER — TELEPHONE (OUTPATIENT)
Dept: PODIATRY CLINIC | Facility: CLINIC | Age: 71
End: 2021-09-21

## 2021-09-21 DIAGNOSIS — G89.29 CHRONIC BILATERAL BACK PAIN, UNSPECIFIED BACK LOCATION: ICD-10-CM

## 2021-09-21 DIAGNOSIS — R53.1 WEAKNESS GENERALIZED: ICD-10-CM

## 2021-09-21 DIAGNOSIS — G35 MULTIPLE SCLEROSIS (HCC): ICD-10-CM

## 2021-09-21 DIAGNOSIS — S90.31XA HEMATOMA OF RIGHT FOOT: ICD-10-CM

## 2021-09-21 DIAGNOSIS — Z91.81 AT RISK FOR FALLING: ICD-10-CM

## 2021-09-21 DIAGNOSIS — E78.00 PURE HYPERCHOLESTEROLEMIA: ICD-10-CM

## 2021-09-21 DIAGNOSIS — M48.00 SPINAL STENOSIS, UNSPECIFIED SPINAL REGION: ICD-10-CM

## 2021-09-21 DIAGNOSIS — R09.89 PULMONARY VASCULAR CONGESTION: ICD-10-CM

## 2021-09-21 DIAGNOSIS — Z99.89 OSA ON CPAP: ICD-10-CM

## 2021-09-21 DIAGNOSIS — I42.0 DCM (DILATED CARDIOMYOPATHY) (HCC): ICD-10-CM

## 2021-09-21 DIAGNOSIS — E66.01 OBESITY, MORBID, BMI 40.0-49.9 (HCC): ICD-10-CM

## 2021-09-21 DIAGNOSIS — K22.70 BARRETT'S ESOPHAGUS WITHOUT DYSPLASIA: ICD-10-CM

## 2021-09-21 DIAGNOSIS — I25.10 ATHEROSCLEROSIS OF NATIVE CORONARY ARTERY OF NATIVE HEART WITHOUT ANGINA PECTORIS: ICD-10-CM

## 2021-09-21 DIAGNOSIS — I10 BENIGN ESSENTIAL HTN: ICD-10-CM

## 2021-09-21 DIAGNOSIS — K21.00 GASTROESOPHAGEAL REFLUX DISEASE WITH ESOPHAGITIS WITHOUT HEMORRHAGE: ICD-10-CM

## 2021-09-21 DIAGNOSIS — L03.115 CELLULITIS OF RIGHT LOWER EXTREMITY: ICD-10-CM

## 2021-09-21 DIAGNOSIS — I48.91 ATRIAL FIBRILLATION WITH NORMAL VENTRICULAR RATE (HCC): Primary | ICD-10-CM

## 2021-09-21 DIAGNOSIS — Z95.1 S/P CABG (CORONARY ARTERY BYPASS GRAFT): ICD-10-CM

## 2021-09-21 DIAGNOSIS — M54.9 CHRONIC BILATERAL BACK PAIN, UNSPECIFIED BACK LOCATION: ICD-10-CM

## 2021-09-21 DIAGNOSIS — I50.22 SYSTOLIC CHF, CHRONIC (HCC): ICD-10-CM

## 2021-09-21 DIAGNOSIS — G47.33 OSA ON CPAP: ICD-10-CM

## 2021-09-21 DIAGNOSIS — Z11.59 SPECIAL SCREENING EXAMINATION FOR VIRAL DISEASE: ICD-10-CM

## 2021-09-21 DIAGNOSIS — E87.6 HYPOKALEMIA: ICD-10-CM

## 2021-09-21 DIAGNOSIS — I25.10 CAD IN NATIVE ARTERY: ICD-10-CM

## 2021-09-21 DIAGNOSIS — Z95.5 PRESENCE OF STENT OF CABG: Primary | ICD-10-CM

## 2021-09-21 DIAGNOSIS — Z79.01 LONG TERM (CURRENT) USE OF ANTICOAGULANTS: ICD-10-CM

## 2021-09-21 DIAGNOSIS — I47.2 VENTRICULAR TACHYARRHYTHMIA (HCC): ICD-10-CM

## 2021-09-21 DIAGNOSIS — Z95.1 PRESENCE OF STENT OF CABG: Primary | ICD-10-CM

## 2021-09-21 PROBLEM — R77.8 TROPONIN LEVEL ELEVATED: Status: RESOLVED | Noted: 2021-03-04 | Resolved: 2021-09-21

## 2021-09-21 PROBLEM — R79.89 TROPONIN LEVEL ELEVATED: Status: RESOLVED | Noted: 2021-03-04 | Resolved: 2021-09-21

## 2021-09-21 LAB
ALBUMIN SERPL-MCNC: 2.7 G/DL (ref 3.4–5)
ALBUMIN/GLOB SERPL: 0.8 {RATIO} (ref 1–2)
ALP LIVER SERPL-CCNC: 97 U/L
ALT SERPL-CCNC: 14 U/L
ANION GAP SERPL CALC-SCNC: 4 MMOL/L (ref 0–18)
AST SERPL-CCNC: 38 U/L (ref 15–37)
BASOPHILS # BLD AUTO: 0.09 X10(3) UL (ref 0–0.2)
BASOPHILS NFR BLD AUTO: 0.8 %
BILIRUB SERPL-MCNC: 0.8 MG/DL (ref 0.1–2)
BUN BLD-MCNC: 9 MG/DL (ref 7–18)
CALCIUM BLD-MCNC: 9.1 MG/DL (ref 8.5–10.1)
CHLORIDE SERPL-SCNC: 107 MMOL/L (ref 98–112)
CO2 SERPL-SCNC: 27 MMOL/L (ref 21–32)
CREAT BLD-MCNC: 0.82 MG/DL
EOSINOPHIL # BLD AUTO: 0.66 X10(3) UL (ref 0–0.7)
EOSINOPHIL NFR BLD AUTO: 6 %
ERYTHROCYTE [DISTWIDTH] IN BLOOD BY AUTOMATED COUNT: 14.2 %
GLOBULIN PLAS-MCNC: 3.5 G/DL (ref 2.8–4.4)
GLUCOSE BLD-MCNC: 80 MG/DL (ref 70–99)
HCT VFR BLD AUTO: 45.1 %
HGB BLD-MCNC: 14.1 G/DL
IMM GRANULOCYTES # BLD AUTO: 0.19 X10(3) UL (ref 0–1)
IMM GRANULOCYTES NFR BLD: 1.7 %
LYMPHOCYTES # BLD AUTO: 2.21 X10(3) UL (ref 1–4)
LYMPHOCYTES NFR BLD AUTO: 20 %
MAGNESIUM SERPL-MCNC: 2.2 MG/DL (ref 1.6–2.6)
MCH RBC QN AUTO: 29.9 PG (ref 26–34)
MCHC RBC AUTO-ENTMCNC: 31.3 G/DL (ref 31–37)
MCV RBC AUTO: 95.6 FL
MONOCYTES # BLD AUTO: 1.46 X10(3) UL (ref 0.1–1)
MONOCYTES NFR BLD AUTO: 13.2 %
NEUTROPHILS # BLD AUTO: 6.44 X10 (3) UL (ref 1.5–7.7)
NEUTROPHILS # BLD AUTO: 6.44 X10(3) UL (ref 1.5–7.7)
NEUTROPHILS NFR BLD AUTO: 58.3 %
OSMOLALITY SERPL CALC.SUM OF ELEC: 284 MOSM/KG (ref 275–295)
PATIENT FASTING Y/N/NP: YES
PLATELET # BLD AUTO: 251 10(3)UL (ref 150–450)
POTASSIUM SERPL-SCNC: 3.8 MMOL/L (ref 3.5–5.1)
PROT SERPL-MCNC: 6.2 G/DL (ref 6.4–8.2)
RBC # BLD AUTO: 4.72 X10(6)UL
SODIUM SERPL-SCNC: 138 MMOL/L (ref 136–145)
VIT D+METAB SERPL-MCNC: 52.8 NG/ML (ref 30–100)
WBC # BLD AUTO: 11.1 X10(3) UL (ref 4–11)

## 2021-09-21 PROCEDURE — 1111F DSCHRG MED/CURRENT MED MERGE: CPT | Performed by: FAMILY MEDICINE

## 2021-09-21 PROCEDURE — 83735 ASSAY OF MAGNESIUM: CPT

## 2021-09-21 PROCEDURE — 85025 COMPLETE CBC W/AUTO DIFF WBC: CPT

## 2021-09-21 PROCEDURE — 82306 VITAMIN D 25 HYDROXY: CPT

## 2021-09-21 PROCEDURE — 99306 1ST NF CARE HIGH MDM 50: CPT | Performed by: FAMILY MEDICINE

## 2021-09-21 PROCEDURE — 80053 COMPREHEN METABOLIC PANEL: CPT

## 2021-09-21 NOTE — DISCHARGE SUMMARY
[facility] HOSPITALIST  DISCHARGE SUMMARY     Breanna Fournier Patient Status:  Inpatient    10/16/1950 MRN [id number]   [facility] 4NW-A Attending No att. providers found   [address] Day # 5 PCP Lynn Oppenheim, MD     Date of Admission: 9/15/2021 medications      Instructions Prescription details   Cefadroxil 500 MG Caps  Commonly known as: DURICEF      Take 1 capsule (500 mg total) by mouth 2 (two) times daily for 7 days.    Stop taking on: September 27, 2021  Quantity: 14 capsule  Refills: 0 Where to Get Your Medications      Please  your prescriptions at the location directed by your doctor or nurse    Bring a paper prescription for each of these medications  · Cefadroxil 500 MG Caps         [place]    Follow-up appo 18  BP: (129)/(68) 129/68    Physical Exam:    General: No acute distress. Respiratory: Clear to auscultation bilaterally. No wheezes. No rhonchi. Cardiovascular: S1, S2. Regular rate and rhythm. No murmurs, rubs or gallops.    Abdomen: Soft, nontender,

## 2021-09-21 NOTE — OCCUPATIONAL THERAPY NOTE
OCCUPATIONAL THERAPY EVALUATION - INPATIENT     Room Number: 404/404-A  Evaluation Date: 9/20/2021  Type of Evaluation: Initial  Presenting Problem: RLE hematoma, RLE cellulitis    Physician Order: IP Consult to Occupational Therapy  Reason for Therapy: AD Spinal stenosis    • Unspecified sleep apnea     wears CPAP   • Unspecified urinary incontinence    • Ventricular arrhythmia    • Visual impairment        Past Surgical History  Past Surgical History:   Procedure Laterality Date   • APPENDECTOMY  1991   • EPIDURAL - LUMBAR;  Surgeon: Mirella Egan MD;  Location: [facility] FOR PAIN MANAGEMENT   • PATIENT DOCUMENTED NOT TO HAVE EXPERIENCED ANY OF THE FOLLOWING EVENTS  8/14/2013    Procedure: LUMBAR EPIDURAL;  Surgeon: Mirella Egan MD;  Location: [facility] functional limits    VISION  Current Vision: wears glasses all the time    PERCEPTION  Overall Perception Status:   WFL - within functional limits    SENSATION  Light touch:  impaired  Patient reports numbness in B hands, B feet    Communication: WFL    Be Patient is a 65year old male admitted on 9/15/2021 for RLE cellulitis, hematoma. Complete medical history and occupational profile noted above. Functional outcome measures completed include:  The AM-HASMUKH ' '6-Clicks' Inpatient Daily Activity Short Form 3-5x/week  Number of Visits to Meet Established Goals: 5    ADL GOALS:  Patient will perform lower body dressing w/ mod I and with adaptive equipment PRN  Patient will perform toileting with mod I and with adaptive equipment PRN.     Functional Transfer Go

## 2021-09-21 NOTE — TELEPHONE ENCOUNTER
LMTCB on pt's preferred # to schedule appt in 2 weeks with Dr. Amy Raza. Pt was discharged from [address] [place] [address] on 09/20/21. Phone room -- when pt calls back please schedule him an appt with Dr. Amy Raza the week of 10/04/21. Thank you.

## 2021-09-21 NOTE — TELEPHONE ENCOUNTER
Please make clinic appointment f/u appointment for patient approximately 2 weeks after discharge. Thanks.

## 2021-09-21 NOTE — PROGRESS NOTES
HPI: New admission [place], was seen on 9/21/21    Neri Arce is a 65year old male who presents for Follow - Up (right foot wound, [place] new admission )   66 yo male presenting to [facility] with PMF of CAD, CHF, PAF, HTN, HLD, MS s/p AICD for Vtach with po that includes appendectomy (1991); cabg (1998); surgical stent (2010); injection, anesthetic/steroid, transforaminal epidural; lumbar/sacral, single level (7/12/2013); patient withough preoperative order for iv antibiotic surgical site infection prophylaxi hours as needed for Pain.  metoprolol tartrate 25 MG Oral Tab, Take 1 tablet (25 mg total) by mouth 2 (two) times daily. Losartan Potassium 25 MG Oral Tab, Take 12.5 mg by mouth daily. atorvastatin 40 MG Oral Tab, Take 40 mg by mouth nightly.   Multiple V fundi benign, both eyes   Ears:  Normal TM's and external ear canals, both ears   Nose: Nares normal, septum midline, mucosa normal, no drainage or sinus tenderness   Throat: Lips, mucosa, and tongue normal; teeth and gums normal   Neck: Supple, symmetrica unspecified spinal region  S/P CABG (coronary artery bypass graft)  Gastroesophageal reflux disease with esophagitis without hemorrhage  Weakness generalized  Ventricular tachyarrhythmia ([facility] [address]     -will continue with PT/OT and pain control, oral steroids a

## 2021-09-22 ENCOUNTER — INITIAL APN SNF VISIT (OUTPATIENT)
Dept: INTERNAL MEDICINE CLINIC | Age: 71
End: 2021-09-22

## 2021-09-22 VITALS
TEMPERATURE: 98 F | RESPIRATION RATE: 20 BRPM | OXYGEN SATURATION: 98 % | BODY MASS INDEX: 34 KG/M2 | WEIGHT: 232.38 LBS | SYSTOLIC BLOOD PRESSURE: 114 MMHG | HEART RATE: 62 BPM | DIASTOLIC BLOOD PRESSURE: 68 MMHG

## 2021-09-22 DIAGNOSIS — I48.20 ATRIAL FIBRILLATION, CHRONIC (HCC): ICD-10-CM

## 2021-09-22 DIAGNOSIS — L03.115 CELLULITIS OF RIGHT LOWER EXTREMITY: Primary | ICD-10-CM

## 2021-09-22 DIAGNOSIS — R53.1 WEAKNESS GENERALIZED: ICD-10-CM

## 2021-09-22 DIAGNOSIS — I50.22 SYSTOLIC CHF, CHRONIC (HCC): ICD-10-CM

## 2021-09-22 DIAGNOSIS — R50.9 ACUTE FEBRILE ILLNESS: ICD-10-CM

## 2021-09-22 DIAGNOSIS — I10 BENIGN ESSENTIAL HTN: ICD-10-CM

## 2021-09-22 DIAGNOSIS — G47.33 OSA ON CPAP: ICD-10-CM

## 2021-09-22 DIAGNOSIS — Z99.89 OSA ON CPAP: ICD-10-CM

## 2021-09-22 LAB — SARS-COV-2 RNA RESP QL NAA+PROBE: NOT DETECTED

## 2021-09-22 PROCEDURE — 99310 SBSQ NF CARE HIGH MDM 45: CPT | Performed by: NURSE PRACTITIONER

## 2021-09-22 PROCEDURE — 1123F ACP DISCUSS/DSCN MKR DOCD: CPT | Performed by: NURSE PRACTITIONER

## 2021-09-22 NOTE — PROGRESS NOTES
Margaret Middleton  : 10/16/1950  Age 65year old  male patient is admitted to Facility: [facility] for subacute rehab.     [address] [place] [address] Drive date:  9/15/21  Discharge date to [facility]:  21  ELOS:  14 days  Anticipated discharge da Heart attack ([facility] [address]     NSTEMI   • Herpes zoster without mention of complication    • High blood pressure    • High cholesterol    • Hyperlipidemia    • Hypertension    • Long term (current) use of anticoagulants    • Multiple sclerosis (HCC)    • Multiple Location: [facility]   • INJECTION, W/WO CONTRAST, DX/THERAPEUTIC SUBSTANCE, EPIDURAL/SUBARACHNOID; LUMBAR/SACRAL  8/14/2013    Procedure: LUMBAR EPIDURAL;  Surgeon: Huan Spencer MD;  Location: [address] [place] [address]   • OPEN Tobacco Use      Smoking status: Former Smoker        Packs/day: 3.00        Years: 12.00        Pack years: 36        Types: Cigarettes        Quit date: 8/10/1977        Years since quittin.1      Smokeless tobacco: Never Used    Vaping Use      Vap otherwise  SKIN: denies any unusual skin lesions or rashes  WOUNDS: Right foot  EYES:no visual complaints or deficits  HENT: denies nasal congestion, sinus pain or sore throat;  RESPIRATORY: denies shortness of breath, wheezing or cough   CARDIOVASCULAR:de evaluations  Ambulation   ADLs/Transfers   DC planning plan for home with spouse    Post hospital discharge readmission risk assessment tool:     Pertinent medical history:   Eufemia Guerra all that apply with \"x\"  [  ]  Heart failure/pulmonary edema 22.8%    DIAGNOSTICS REVIEWED AT THIS VISIT:    Lab Results   Component Value Date    WBC 11.1 (H) 09/21/2021    RBC 4.72 09/21/2021    HGB 14.1 09/21/2021    HCT 45.1 09/21/2021    MCV 95.6 09/21/2021    MCH 29.9 09/21/2021    MCHC 31.3 09/21/2021    RDW and Karla Aparicio a week ago Eliquis is on hold until October 1    Peptic ulcer disease  No current Rx  Continue to monitor off anticoagulation    Multiple sclerosis  Ocrelizumab 600 mg IV every 6 months  Next due in December follow-up as an outpatient    O

## 2021-09-23 ENCOUNTER — MED REC SCAN ONLY (OUTPATIENT)
Dept: FAMILY MEDICINE CLINIC | Facility: CLINIC | Age: 71
End: 2021-09-23

## 2021-09-23 RX ORDER — HYDROCODONE BITARTRATE AND ACETAMINOPHEN 5; 325 MG/1; MG/1
1 TABLET ORAL EVERY 8 HOURS PRN
Qty: 30 TABLET | Refills: 0 | Status: SHIPPED | OUTPATIENT
Start: 2021-09-23

## 2021-09-24 ENCOUNTER — SNF VISIT (OUTPATIENT)
Dept: INTERNAL MEDICINE CLINIC | Age: 71
End: 2021-09-24

## 2021-09-24 VITALS
RESPIRATION RATE: 18 BRPM | BODY MASS INDEX: 35 KG/M2 | WEIGHT: 235.19 LBS | SYSTOLIC BLOOD PRESSURE: 111 MMHG | DIASTOLIC BLOOD PRESSURE: 69 MMHG | OXYGEN SATURATION: 97 % | TEMPERATURE: 97 F | HEART RATE: 62 BPM

## 2021-09-24 DIAGNOSIS — I50.22 SYSTOLIC CHF, CHRONIC (HCC): ICD-10-CM

## 2021-09-24 DIAGNOSIS — L03.115 CELLULITIS OF RIGHT LOWER EXTREMITY: Primary | ICD-10-CM

## 2021-09-24 DIAGNOSIS — R53.1 WEAKNESS GENERALIZED: ICD-10-CM

## 2021-09-24 DIAGNOSIS — Z78.9 DECREASED ACTIVITIES OF DAILY LIVING (ADL): ICD-10-CM

## 2021-09-24 DIAGNOSIS — I10 BENIGN ESSENTIAL HTN: ICD-10-CM

## 2021-09-24 DIAGNOSIS — I48.20 ATRIAL FIBRILLATION, CHRONIC (HCC): ICD-10-CM

## 2021-09-24 PROCEDURE — 99309 SBSQ NF CARE MODERATE MDM 30: CPT | Performed by: NURSE PRACTITIONER

## 2021-09-24 NOTE — PROGRESS NOTES
Mezaguicho Allen, 10/16/1950, 65year old, male    Chief Complaint:  Patient presents with:   Follow - Up: left foot and ankle pain, wound  Pain  Weakness       HPI:    Kel Kendall is a 64-year-old male with previous medical history including A. fib, CAD, hypertensi moist, scab noted. Surrounding dried blood removed with saline, surrounding tissue  red, firm and swollen. lateral side of the foot is red, firm, and tender. Posterior foot/heel is tender, ankle is red and tender with bruising noted laterally.  Replaced non 0.8 09/21/2021    TP 6.2 (L) 09/21/2021    ALB 2.7 (L) 09/21/2021    GLOBULIN 3.5 09/21/2021     09/21/2021    K 3.8 09/21/2021     09/21/2021    CO2 27.0 09/21/2021     Assessment and plan:  Right foot hematoma, cellulitis status post I&D with DC  Cardiology Dr. Maria Ines Turner October 8 pacemaker clinic as well            Future Appointments   Date Time Provider Reshma Nickerson   10/8/2021  2:30 PM [address] [facility]   10/8/2021  3:00 PM Galindo Burnham MD [facility] [address] [facility]

## 2021-09-27 ENCOUNTER — SNF DISCHARGE (OUTPATIENT)
Dept: INTERNAL MEDICINE CLINIC | Age: 71
End: 2021-09-27

## 2021-09-27 VITALS
DIASTOLIC BLOOD PRESSURE: 67 MMHG | TEMPERATURE: 97 F | BODY MASS INDEX: 36 KG/M2 | SYSTOLIC BLOOD PRESSURE: 122 MMHG | RESPIRATION RATE: 18 BRPM | WEIGHT: 241 LBS | OXYGEN SATURATION: 95 % | HEART RATE: 72 BPM

## 2021-09-27 DIAGNOSIS — Z78.9 DECREASED ACTIVITIES OF DAILY LIVING (ADL): ICD-10-CM

## 2021-09-27 DIAGNOSIS — L03.115 CELLULITIS OF RIGHT LOWER EXTREMITY: Primary | ICD-10-CM

## 2021-09-27 DIAGNOSIS — I50.22 SYSTOLIC CHF, CHRONIC (HCC): ICD-10-CM

## 2021-09-27 DIAGNOSIS — I48.20 ATRIAL FIBRILLATION, CHRONIC (HCC): ICD-10-CM

## 2021-09-27 DIAGNOSIS — S90.31XA HEMATOMA OF RIGHT FOOT: ICD-10-CM

## 2021-09-27 DIAGNOSIS — I10 BENIGN ESSENTIAL HTN: ICD-10-CM

## 2021-09-27 PROCEDURE — 99316 NF DSCHRG MGMT 30 MIN+: CPT | Performed by: NURSE PRACTITIONER

## 2021-09-27 NOTE — PROGRESS NOTES
Destiny Rhettruben, 10/16/1950, 65year old, male is being discharged from [address] [place] [address] at [address]    Date of Admission: 9/20/21    Date of Discharge:9/30/21                                 Admitting Diagnoses:  Right ari dressing with kerlex    EYES: sclera anicteric, conjunctiva normal; there is no nystagmus, no drainage from eyes  HENT: normocephalic; normal nose, no nasal drainage, mucous membranes pink, moist.  NECK: supple, non tender, FROM  BREAST: deferred  RESPIRAT AST 38 (H) 09/21/2021    ALT 14 (L) 09/21/2021    BILT 0.8 09/21/2021    TP 6.2 (L) 09/21/2021    ALB 2.7 (L) 09/21/2021    GLOBULIN 3.5 09/21/2021     09/21/2021    K 3.8 09/21/2021     09/21/2021    CO2 27.0 09/21/2021     Doppler RLE no DVT, until October 1     GI prophylaxis  None at this time continue to monitor     Labs  CBC, CMP weekly and prn     Follow Ups:  PCP within 7 days of DC  Cardiology Dr. Sofya Villareal October 8 pacemaker clinic as well                 Future Appointments   Date Time Pro

## 2021-09-28 ENCOUNTER — NURSE ONLY (OUTPATIENT)
Dept: LAB | Age: 71
End: 2021-09-28
Attending: FAMILY MEDICINE
Payer: MEDICARE

## 2021-09-28 ENCOUNTER — EXTERNAL FACILITY (OUTPATIENT)
Dept: FAMILY MEDICINE CLINIC | Facility: CLINIC | Age: 71
End: 2021-09-28

## 2021-09-28 DIAGNOSIS — K21.00 GASTROESOPHAGEAL REFLUX DISEASE WITH ESOPHAGITIS WITHOUT HEMORRHAGE: ICD-10-CM

## 2021-09-28 DIAGNOSIS — Z99.89 OSA ON CPAP: ICD-10-CM

## 2021-09-28 DIAGNOSIS — I25.10 ATHEROSCLEROSIS OF NATIVE CORONARY ARTERY OF NATIVE HEART WITHOUT ANGINA PECTORIS: ICD-10-CM

## 2021-09-28 DIAGNOSIS — Z95.810 S/P IMPLANTATION OF AUTOMATIC CARDIOVERTER/DEFIBRILLATOR (AICD): ICD-10-CM

## 2021-09-28 DIAGNOSIS — Z95.1 S/P CABG (CORONARY ARTERY BYPASS GRAFT): ICD-10-CM

## 2021-09-28 DIAGNOSIS — L03.115 CELLULITIS OF RIGHT FOOT: Primary | ICD-10-CM

## 2021-09-28 DIAGNOSIS — M48.00 SPINAL STENOSIS, UNSPECIFIED SPINAL REGION: ICD-10-CM

## 2021-09-28 DIAGNOSIS — I25.10 CAD IN NATIVE ARTERY: ICD-10-CM

## 2021-09-28 DIAGNOSIS — I50.22 SYSTOLIC CHF, CHRONIC (HCC): Primary | ICD-10-CM

## 2021-09-28 DIAGNOSIS — R53.1 WEAKNESS GENERALIZED: ICD-10-CM

## 2021-09-28 DIAGNOSIS — L03.115 CELLULITIS OF RIGHT LOWER EXTREMITY: ICD-10-CM

## 2021-09-28 DIAGNOSIS — G47.33 OSA ON CPAP: ICD-10-CM

## 2021-09-28 DIAGNOSIS — S90.31XA HEMATOMA OF RIGHT FOOT: ICD-10-CM

## 2021-09-28 DIAGNOSIS — E56.9 VITAMIN DISEASE: ICD-10-CM

## 2021-09-28 DIAGNOSIS — I48.20 ATRIAL FIBRILLATION, CHRONIC (HCC): ICD-10-CM

## 2021-09-28 DIAGNOSIS — G89.29 CHRONIC BILATERAL BACK PAIN, UNSPECIFIED BACK LOCATION: ICD-10-CM

## 2021-09-28 DIAGNOSIS — K22.70 BARRETT'S ESOPHAGUS WITHOUT DYSPLASIA: ICD-10-CM

## 2021-09-28 DIAGNOSIS — E66.01 OBESITY, MORBID, BMI 40.0-49.9 (HCC): ICD-10-CM

## 2021-09-28 DIAGNOSIS — R09.89 PULMONARY VASCULAR CONGESTION: ICD-10-CM

## 2021-09-28 DIAGNOSIS — M54.9 CHRONIC BILATERAL BACK PAIN, UNSPECIFIED BACK LOCATION: ICD-10-CM

## 2021-09-28 DIAGNOSIS — E78.00 PURE HYPERCHOLESTEROLEMIA: ICD-10-CM

## 2021-09-28 DIAGNOSIS — Z79.01 LONG TERM (CURRENT) USE OF ANTICOAGULANTS: ICD-10-CM

## 2021-09-28 DIAGNOSIS — I10 BENIGN ESSENTIAL HTN: ICD-10-CM

## 2021-09-28 DIAGNOSIS — E87.6 HYPOKALEMIA: ICD-10-CM

## 2021-09-28 DIAGNOSIS — G35 MULTIPLE SCLEROSIS (HCC): ICD-10-CM

## 2021-09-28 DIAGNOSIS — I42.0 DCM (DILATED CARDIOMYOPATHY) (HCC): ICD-10-CM

## 2021-09-28 DIAGNOSIS — Z91.81 AT RISK FOR FALLING: ICD-10-CM

## 2021-09-28 LAB
ALBUMIN SERPL-MCNC: 2.5 G/DL (ref 3.4–5)
ALBUMIN/GLOB SERPL: 0.8 {RATIO} (ref 1–2)
ALP LIVER SERPL-CCNC: 87 U/L
ALT SERPL-CCNC: 35 U/L
ANION GAP SERPL CALC-SCNC: 5 MMOL/L (ref 0–18)
AST SERPL-CCNC: 49 U/L (ref 15–37)
BASOPHILS # BLD AUTO: 0.05 X10(3) UL (ref 0–0.2)
BASOPHILS NFR BLD AUTO: 0.6 %
BILIRUB SERPL-MCNC: 0.8 MG/DL (ref 0.1–2)
BUN BLD-MCNC: 13 MG/DL (ref 7–18)
CALCIUM BLD-MCNC: 8.9 MG/DL (ref 8.5–10.1)
CHLORIDE SERPL-SCNC: 109 MMOL/L (ref 98–112)
CO2 SERPL-SCNC: 25 MMOL/L (ref 21–32)
CREAT BLD-MCNC: 0.8 MG/DL
EOSINOPHIL # BLD AUTO: 0.47 X10(3) UL (ref 0–0.7)
EOSINOPHIL NFR BLD AUTO: 6 %
ERYTHROCYTE [DISTWIDTH] IN BLOOD BY AUTOMATED COUNT: 14.3 %
GLOBULIN PLAS-MCNC: 3.2 G/DL (ref 2.8–4.4)
GLUCOSE BLD-MCNC: 75 MG/DL (ref 70–99)
HCT VFR BLD AUTO: 41.4 %
HGB BLD-MCNC: 13.1 G/DL
IMM GRANULOCYTES # BLD AUTO: 0.11 X10(3) UL (ref 0–1)
IMM GRANULOCYTES NFR BLD: 1.4 %
LYMPHOCYTES # BLD AUTO: 2.11 X10(3) UL (ref 1–4)
LYMPHOCYTES NFR BLD AUTO: 27.2 %
MCH RBC QN AUTO: 30.5 PG (ref 26–34)
MCHC RBC AUTO-ENTMCNC: 31.6 G/DL (ref 31–37)
MCV RBC AUTO: 96.5 FL
MONOCYTES # BLD AUTO: 0.96 X10(3) UL (ref 0.1–1)
MONOCYTES NFR BLD AUTO: 12.4 %
NEUTROPHILS # BLD AUTO: 4.07 X10 (3) UL (ref 1.5–7.7)
NEUTROPHILS # BLD AUTO: 4.07 X10(3) UL (ref 1.5–7.7)
NEUTROPHILS NFR BLD AUTO: 52.4 %
OSMOLALITY SERPL CALC.SUM OF ELEC: 287 MOSM/KG (ref 275–295)
PATIENT FASTING Y/N/NP: YES
PLATELET # BLD AUTO: 219 10(3)UL (ref 150–450)
POTASSIUM SERPL-SCNC: 3.7 MMOL/L (ref 3.5–5.1)
PROT SERPL-MCNC: 5.7 G/DL (ref 6.4–8.2)
RBC # BLD AUTO: 4.29 X10(6)UL
SODIUM SERPL-SCNC: 139 MMOL/L (ref 136–145)
WBC # BLD AUTO: 7.8 X10(3) UL (ref 4–11)

## 2021-09-28 PROCEDURE — 80053 COMPREHEN METABOLIC PANEL: CPT

## 2021-09-28 PROCEDURE — 99308 SBSQ NF CARE LOW MDM 20: CPT | Performed by: FAMILY MEDICINE

## 2021-09-28 PROCEDURE — 1111F DSCHRG MED/CURRENT MED MERGE: CPT | Performed by: FAMILY MEDICINE

## 2021-09-28 PROCEDURE — 85025 COMPLETE CBC W/AUTO DIFF WBC: CPT

## 2021-09-29 ENCOUNTER — NURSE ONLY (OUTPATIENT)
Dept: LAB | Age: 71
End: 2021-09-29
Attending: NURSE PRACTITIONER
Payer: MEDICARE

## 2021-09-29 ENCOUNTER — SNF VISIT (OUTPATIENT)
Dept: INTERNAL MEDICINE CLINIC | Age: 71
End: 2021-09-29

## 2021-09-29 VITALS
TEMPERATURE: 97 F | BODY MASS INDEX: 36 KG/M2 | RESPIRATION RATE: 18 BRPM | WEIGHT: 241.63 LBS | SYSTOLIC BLOOD PRESSURE: 113 MMHG | OXYGEN SATURATION: 92 % | DIASTOLIC BLOOD PRESSURE: 69 MMHG | HEART RATE: 62 BPM

## 2021-09-29 DIAGNOSIS — I10 BENIGN ESSENTIAL HTN: ICD-10-CM

## 2021-09-29 DIAGNOSIS — L03.90 CELLULITIS: Primary | ICD-10-CM

## 2021-09-29 DIAGNOSIS — I48.20 ATRIAL FIBRILLATION, CHRONIC (HCC): ICD-10-CM

## 2021-09-29 DIAGNOSIS — Z78.9 DECREASED ACTIVITIES OF DAILY LIVING (ADL): ICD-10-CM

## 2021-09-29 DIAGNOSIS — I50.22 SYSTOLIC CHF, CHRONIC (HCC): Primary | ICD-10-CM

## 2021-09-29 DIAGNOSIS — M79.671 RIGHT FOOT PAIN: ICD-10-CM

## 2021-09-29 DIAGNOSIS — R53.1 WEAKNESS GENERALIZED: ICD-10-CM

## 2021-09-29 PROCEDURE — 86140 C-REACTIVE PROTEIN: CPT

## 2021-09-29 PROCEDURE — 99309 SBSQ NF CARE MODERATE MDM 30: CPT | Performed by: NURSE PRACTITIONER

## 2021-09-29 PROCEDURE — 84550 ASSAY OF BLOOD/URIC ACID: CPT

## 2021-09-29 NOTE — CDS QUERY
Clarification of Procedure - Debridement   CLINICAL DOCUMENTATION CLARIFICATION FORM  Dear Doctor Block,   Clinical information (provided below) indicates a debridement (Incision and Drainage) was done.  For accurate ICD-10-PCS code assignment to reflect irrigated with saline.   Incision site was dressed with Adaptic, gauze, Kerlix, and mildly compressive Ace wrap.”    For questions regarding this query, please contact Clinical Documentation : Itz Hernandez [phone], cell [phone]

## 2021-09-29 NOTE — PROGRESS NOTES
Mezaguicho Allen, 10/16/1950, 65year old, male    Chief Complaint:  Patient presents with:   Follow - Up: right foot pain  Lab Results  Pain       HPI:    Kel Kendall is a 64-year-old male with previous medical history including A. fib, CAD, hypertension, hyperlipi nourished, in no apparent distress  LINES, TUBES, DRAINS:  none  SKIN: pink, warm, dry  WOUND: Right foot with open area on the top of the foot from hematoma evacuation, clean, moist, minimal bloody drainage.  Surrounding tissue deep red, firm not hot, mini CREATSERUM 0.80 09/28/2021    ANIONGAP 5 09/28/2021    GFR 78 07/10/2017    GFRNAA 89 09/28/2021    GFRAA 103 09/28/2021    CA 8.9 09/28/2021    OSMOCALC 287 09/28/2021    ALKPHO 87 09/28/2021    AST 49 (H) 09/28/2021    ALT 35 09/28/2021    BILT 0.8 09/28 famotidine with addition of prednisone for 5 days     Multiple sclerosis  Ocrelizumab 600 mg IV every 6 months  Next due in December follow-up as an outpatient     Obstructive sleep apnea  CPAP all sleep     Supplements  Calcium carbonate daily  Cholecalci

## 2021-09-30 ENCOUNTER — OFFICE VISIT (OUTPATIENT)
Dept: FAMILY MEDICINE CLINIC | Facility: CLINIC | Age: 71
End: 2021-09-30
Payer: MEDICARE

## 2021-09-30 VITALS
BODY MASS INDEX: 36.73 KG/M2 | WEIGHT: 248 LBS | HEART RATE: 76 BPM | SYSTOLIC BLOOD PRESSURE: 98 MMHG | DIASTOLIC BLOOD PRESSURE: 54 MMHG | HEIGHT: 69 IN | OXYGEN SATURATION: 100 % | TEMPERATURE: 97 F | RESPIRATION RATE: 16 BRPM

## 2021-09-30 DIAGNOSIS — M79.671 RIGHT FOOT PAIN: ICD-10-CM

## 2021-09-30 DIAGNOSIS — G35 MULTIPLE SCLEROSIS (HCC): ICD-10-CM

## 2021-09-30 DIAGNOSIS — L03.115 CELLULITIS OF RIGHT LEG: Primary | ICD-10-CM

## 2021-09-30 DIAGNOSIS — I25.10 ATHEROSCLEROSIS OF NATIVE CORONARY ARTERY OF NATIVE HEART WITHOUT ANGINA PECTORIS: ICD-10-CM

## 2021-09-30 DIAGNOSIS — Z95.810 S/P IMPLANTATION OF AUTOMATIC CARDIOVERTER/DEFIBRILLATOR (AICD): ICD-10-CM

## 2021-09-30 DIAGNOSIS — E66.01 OBESITY, MORBID, BMI 40.0-49.9 (HCC): ICD-10-CM

## 2021-09-30 DIAGNOSIS — I48.91 ATRIAL FIBRILLATION WITH NORMAL VENTRICULAR RATE (HCC): ICD-10-CM

## 2021-09-30 DIAGNOSIS — Z95.1 S/P CABG (CORONARY ARTERY BYPASS GRAFT): ICD-10-CM

## 2021-09-30 PROCEDURE — 99215 OFFICE O/P EST HI 40 MIN: CPT | Performed by: FAMILY MEDICINE

## 2021-09-30 PROCEDURE — 1111F DSCHRG MED/CURRENT MED MERGE: CPT | Performed by: FAMILY MEDICINE

## 2021-09-30 RX ORDER — FAMOTIDINE 20 MG/1
20 TABLET ORAL 2 TIMES DAILY
COMMUNITY

## 2021-09-30 RX ORDER — PREDNISONE 20 MG/1
40 TABLET ORAL DAILY
COMMUNITY
End: 2021-10-26 | Stop reason: ALTCHOICE

## 2021-09-30 RX ORDER — CEFADROXIL 500 MG/1
500 CAPSULE ORAL 2 TIMES DAILY
COMMUNITY
End: 2021-09-30

## 2021-09-30 RX ORDER — GABAPENTIN 100 MG/1
100 CAPSULE ORAL 3 TIMES DAILY
COMMUNITY

## 2021-09-30 RX ORDER — CEFADROXIL 500 MG/1
500 CAPSULE ORAL 2 TIMES DAILY
Qty: 14 CAPSULE | Refills: 0 | Status: SHIPPED | OUTPATIENT
Start: 2021-09-30

## 2021-09-30 NOTE — PATIENT INSTRUCTIONS
Take cefadroxil 500 mg 1 tablet twice a day. Finish course of prednisone. Use Norco/hydrocodone as needed for pain. Restart Eliquis tomorrow. Elevate your leg. Change dressing once a day. See infectious disease doctor for follow-up.   Set up an appo

## 2021-10-01 ENCOUNTER — PATIENT OUTREACH (OUTPATIENT)
Dept: CASE MANAGEMENT | Age: 71
End: 2021-10-01

## 2021-10-01 ENCOUNTER — TELEPHONE (OUTPATIENT)
Dept: FAMILY MEDICINE CLINIC | Facility: CLINIC | Age: 71
End: 2021-10-01

## 2021-10-01 ENCOUNTER — OFFICE VISIT (OUTPATIENT)
Dept: PODIATRY CLINIC | Facility: CLINIC | Age: 71
End: 2021-10-01
Payer: MEDICARE

## 2021-10-01 DIAGNOSIS — Z02.9 ENCOUNTERS FOR ADMINISTRATIVE PURPOSE: ICD-10-CM

## 2021-10-01 DIAGNOSIS — M25.571 ACUTE RIGHT ANKLE PAIN: ICD-10-CM

## 2021-10-01 DIAGNOSIS — M10.071 ACUTE IDIOPATHIC GOUT OF RIGHT ANKLE: ICD-10-CM

## 2021-10-01 DIAGNOSIS — S90.31XA HEMATOMA OF RIGHT FOOT: Primary | ICD-10-CM

## 2021-10-01 PROCEDURE — 99213 OFFICE O/P EST LOW 20 MIN: CPT | Performed by: STUDENT IN AN ORGANIZED HEALTH CARE EDUCATION/TRAINING PROGRAM

## 2021-10-01 NOTE — PROGRESS NOTES
Tiki Monroy is a 65year old male. cc right foot pain, cellulitis, GERD, chronic A. fib, coronary artery disease status post CABG, MS, obesity,  HPI:   Patient is coming to the office after he was discharged from skilled nursing facility [facility]. famotidine 20 MG Oral Tab Take 20 mg by mouth 2 (two) times daily. For 7 days only     • predniSONE 20 MG Oral Tab Take 40 mg by mouth daily. For 5 days     • gabapentin 100 MG Oral Cap Take 100 mg by mouth 3 (three) times daily.      • apixaban 5 MG Oral T without mention of complication    • High blood pressure    • High cholesterol    • Hyperlipidemia    • Hypertension    • Long term (current) use of anticoagulants    • Multiple sclerosis (HCC)    • Multiple sclerosis (HCC)    • Neuropathy    • Obesity, un 98/54 (BP Location: Right arm, Patient Position: Sitting, Cuff Size: adult)   Pulse 76   Temp 97.1 °F (36.2 °C) (Oral)   Resp 16   Ht 5' 9\" (1.753 m)   Wt 248 lb (112.5 kg)   SpO2 100%   BMI 36.62 kg/m²   GENERAL: well developed, well nourished,in no appa will have him to continue prednisone 40 mg once a day for 5 days until finished.   We will recheck uric acid level later on at some point, there is open wound on the dorsal aspect after I&D change dressing daily with mupirocin ointment,     Atrial fibrillat INTERNAL    The patient indicates understanding of these issues and agrees to the plan. The patient is asked to return in to be decided after podiatry evaluation  and clinical picture. Time spent was 40 minutes spent on visit and documentation.   The note

## 2021-10-01 NOTE — TELEPHONE ENCOUNTER
Received call from pt.   Pt sts he just started antibiotics on 09/30/21, and prednisone 09/28  Pt sts he is \"feeling okay\", no new or worsening s/s  Advised to continue taking Cefadroxil and prednisone and contact ofc with new or worsening s/s    *Routed

## 2021-10-01 NOTE — TELEPHONE ENCOUNTER
Left 2 voice mails for the pt RE:   Office visit yesterday. Per Dr. Jeni Mayo the pt is advised to stay ion the course of Antibiotics and Prednisone. Also Dr. Jeni Mayo wants an update on how he is feeling?

## 2021-10-01 NOTE — PATIENT INSTRUCTIONS
-Take prednisone and cefadroxil as prescribed. -Use compression stockings to lower extremities.  -Seek immediate medical attention if any acute signs of infection arise.

## 2021-10-01 NOTE — PROGRESS NOTES
[address] [facility] Podiatry  Progress Note    Reji Matthews is a 65year old male. Patient presents with:  Post-Op: follow up right foot.  pain 3/10        HPI:     Patient is a 75-year-old male with past medical history of multiple sclerosis who is seen in  by mouth every 6 (six) hours as needed for Pain. • metoprolol tartrate 25 MG Oral Tab Take 1 tablet (25 mg total) by mouth 2 (two) times daily. 60 tablet 0   • Losartan Potassium 25 MG Oral Tab Take 12.5 mg by mouth daily.      • atorvastatin 40 MG Oral Bilateral anterior thigh-area pain.    • Pure hypercholesterolemia 6/29/2012   • Spinal stenosis    • Unspecified sleep apnea     wears CPAP   • Unspecified urinary incontinence    • Ventricular arrhythmia    • Visual impairment       Past Surgical History: FOLLOWING EVENTS  7/31/2013    Procedure: TRANSFORAMINAL EPIDURAL - LUMBAR;  Surgeon: Jeffery Cao MD;  Location: [address] [place] [address]   • PATIENT DOCUMENTED NOT TO HAVE EXPERIENCED ANY OF THE FOLLOWING EVENTS  8/14/2013    Procedure: LUMBAR E Concerns:        Caffeine Concern: No          decaf coffee         Back Care: No        Exercise: No          x2 a week water aerobic - on hold           REVIEW OF SYSTEMS:     Today reviewed systems as documented below  GENERAL HEALTH: feels well other from prior pain associated with hematoma.  -Patient afebrile, no leukocytosis.  -Agree with assessment of possible gout as source of patient's pain to the lateral ankle region.   No open wounds or acute signs of infection appreciated to site.  -Patient just

## 2021-10-04 ENCOUNTER — PATIENT MESSAGE (OUTPATIENT)
Dept: FAMILY MEDICINE CLINIC | Facility: CLINIC | Age: 71
End: 2021-10-04

## 2021-10-04 ENCOUNTER — TELEPHONE (OUTPATIENT)
Dept: FAMILY MEDICINE CLINIC | Facility: CLINIC | Age: 71
End: 2021-10-04

## 2021-10-04 PROBLEM — R59.0 MEDIASTINAL LYMPHADENOPATHY: Status: RESOLVED | Noted: 2018-10-12 | Resolved: 2021-10-04

## 2021-10-04 NOTE — TELEPHONE ENCOUNTER
Pt has resumed home health as of today. PT/OT to follow. [facility] wound care for cellulitis on right dorsal area.

## 2021-10-04 NOTE — PROGRESS NOTES
HPI: New admission [address], was seen on 9/21/21    Breanna Fournier is a 65year old male who presents for Follow - Up [facility] subsequent visit, right foot pain with post op boot in place )   66 yo male presenting to [facility] with PMF of CAD, CHF, PAF, HTN, HLD, incontinence, Ventricular arrhythmia, and Visual impairment.    He  has a past surgical history that includes appendectomy (1991); cabg (1998); surgical stent (2010); injection, anesthetic/steroid, transforaminal epidural; lumbar/sacral, single level (7/12/ 8 (eight) hours as needed for Pain. acetaminophen 325 MG Oral Tab, Take 650 mg by mouth every 6 (six) hours as needed for Pain.  metoprolol tartrate 25 MG Oral Tab, Take 1 tablet (25 mg total) by mouth 2 (two) times daily.   Losartan Potassium 25 MG Oral T without obvious abnormality, atraumatic   Eyes:  PERRL, conjunctiva/corneas clear, EOM's intact, fundi benign, both eyes   Ears:  Normal TM's and external ear canals, both ears   Nose: Nares normal, septum midline, mucosa normal, no drainage or sinus tende CPAP  Pulmonary vascular congestion  Weakness generalized  Spinal stenosis, unspecified spinal region  S/P implantation of automatic cardioverter/defibrillator (AICD)  S/P CABG (coronary artery bypass graft)  Gastroesophageal reflux disease with esophagiti

## 2021-10-05 ENCOUNTER — TELEPHONE (OUTPATIENT)
Dept: FAMILY MEDICINE CLINIC | Facility: CLINIC | Age: 71
End: 2021-10-05

## 2021-10-05 NOTE — TELEPHONE ENCOUNTER
Per PT Constantino ta pt is doing well  Able to remember all the exercises taught and following through independently  No PT recommended at this time  But will continue to monitor  [facility]

## 2021-10-06 ENCOUNTER — OFFICE VISIT (OUTPATIENT)
Dept: PODIATRY CLINIC | Facility: CLINIC | Age: 71
End: 2021-10-06
Payer: MEDICARE

## 2021-10-06 ENCOUNTER — MED REC SCAN ONLY (OUTPATIENT)
Dept: FAMILY MEDICINE CLINIC | Facility: CLINIC | Age: 71
End: 2021-10-06

## 2021-10-06 DIAGNOSIS — S90.31XA HEMATOMA OF RIGHT FOOT: Primary | ICD-10-CM

## 2021-10-06 DIAGNOSIS — M10.071 ACUTE IDIOPATHIC GOUT OF RIGHT ANKLE: ICD-10-CM

## 2021-10-06 PROCEDURE — 99213 OFFICE O/P EST LOW 20 MIN: CPT | Performed by: STUDENT IN AN ORGANIZED HEALTH CARE EDUCATION/TRAINING PROGRAM

## 2021-10-06 NOTE — PROGRESS NOTES
[facility] Podiatry  Progress Note    Edel Carver is a 65year old male. Patient presents with:  Post-Op: right foot post op. swollen. denies pain in foot pain in hip.         HPI:     Patient is a 49-year-old male with past medical history of multip nightly. • Multiple Vitamins-Minerals (TAB-A-STEPHEN) Oral Tab Take 1 tablet by mouth daily. • furosemide 20 MG Oral Tab Take 1 tablet (20 mg total) by mouth daily.  30 tablet 3   • amiodarone HCl 200 MG Oral Tab Take 1 tablet (200 mg total) by mouth d • APPENDECTOMY  1991   • CABG  1998   • CARDIAC DEFIBRILLATOR PLACEMENT      [employer], not pacer dependent.   Magnet response inhibit therapy   • COLONOSCOPY  1/2016    Dr Claire Shearer due in 5 years   • JUDITH LOPEZ & Deena Monahan NDL/CATH SPI DX/THER [facility]  7/31/ Pablo Belle MD;  Location: [facility] FOR PAIN [facility]NAG[facility]   • PATIENT [place] PREOPERATIVE ORDER FOR IV ANTIBIOTIC SURGICAL SITE INFECTION PROPHYLAXIS.  7/12/2013    Procedure: TRANSFORAMINAL EPIDURAL - LUMBAR;  Surgeon: Mireya Page MD;  Location: [facility] unusual skin lesions or rashes  RESPIRATORY: denies shortness of breath with exertion  CARDIOVASCULAR: denies chest pain on exertion  GI: denies abdominal pain and denies heartburn  NEURO: denies headaches  MUSCULO: Confirms arthritis, confirms right hip p medications.  -Because pain to ankle region is improved at this visit, will hold off on steroid injection at this time.  -Advised patient that steroid injection can be performed in the future if acute gout attack arises.   -Patient to continue wearing compr

## 2021-10-06 NOTE — PATIENT INSTRUCTIONS
-Finish cefadroxil and prednisone as prescribed. -Use compression stockings daily.  -Follow-up with infectious disease as scheduled.

## 2021-10-07 ENCOUNTER — TELEPHONE (OUTPATIENT)
Dept: FAMILY MEDICINE CLINIC | Facility: CLINIC | Age: 71
End: 2021-10-07

## 2021-10-07 NOTE — TELEPHONE ENCOUNTER
Mery Iverson from residential home [facility] called. sts she evaluated pt today. Reports this will be her only vs, is not recommending any further visits. She said he is independent with his exercises and she showed him exercises he can do safely at home.     Daya Elder

## 2021-10-07 NOTE — TELEPHONE ENCOUNTER
From: Destini AMBROSE  To:  Polly 75: 10/1/2021 11:36 AM CDT  Subject: information    Kathya Arcos,     Dr. Chandana Wilcox spoke with [facility]) late last night regarding your medication that you and Dr. Chandana Wilcox discussed in the of

## 2021-10-13 ENCOUNTER — TELEPHONE (OUTPATIENT)
Dept: FAMILY MEDICINE CLINIC | Facility: CLINIC | Age: 71
End: 2021-10-13

## 2021-10-13 NOTE — TELEPHONE ENCOUNTER
Pt wife would like to know the results of his recent uric acid test results. Please advise. Thank you.

## 2021-10-13 NOTE — TELEPHONE ENCOUNTER
S/w Savanna/wife. On pts HIPAA consent. Advised that uric acid level was 6.0, reference range of 3.5-7.2. Informed Savanna that lab was ordered by MARC De La Vega.

## 2021-10-14 ENCOUNTER — MED REC SCAN ONLY (OUTPATIENT)
Dept: FAMILY MEDICINE CLINIC | Facility: CLINIC | Age: 71
End: 2021-10-14

## 2021-10-15 ENCOUNTER — APPOINTMENT (OUTPATIENT)
Dept: GENERAL RADIOLOGY | Facility: HOSPITAL | Age: 71
End: 2021-10-15
Attending: EMERGENCY MEDICINE
Payer: MEDICARE

## 2021-10-15 ENCOUNTER — HOSPITAL ENCOUNTER (EMERGENCY)
Facility: HOSPITAL | Age: 71
Discharge: HOME | End: 2021-10-15
Attending: EMERGENCY MEDICINE
Payer: MEDICARE

## 2021-10-15 ENCOUNTER — HOSPITAL ENCOUNTER (EMERGENCY)
Facility: HOSPITAL | Age: 71
Discharge: HOME | End: 2021-10-16
Attending: EMERGENCY MEDICINE
Payer: MEDICARE

## 2021-10-15 VITALS
BODY MASS INDEX: 35 KG/M2 | TEMPERATURE: 98 F | DIASTOLIC BLOOD PRESSURE: 72 MMHG | SYSTOLIC BLOOD PRESSURE: 143 MMHG | OXYGEN SATURATION: 98 % | HEART RATE: 64 BPM | WEIGHT: 240 LBS | RESPIRATION RATE: 16 BRPM

## 2021-10-15 DIAGNOSIS — S91.311A LACERATION OF RIGHT FOOT, INITIAL ENCOUNTER: ICD-10-CM

## 2021-10-15 DIAGNOSIS — S91.114D LACERATION OF LESSER TOE OF RIGHT FOOT WITHOUT FOREIGN BODY PRESENT OR DAMAGE TO NAIL, SUBSEQUENT ENCOUNTER: Primary | ICD-10-CM

## 2021-10-15 DIAGNOSIS — S92.524B OPEN NONDISPLACED FRACTURE OF MIDDLE PHALANX OF LESSER TOE OF RIGHT FOOT, INITIAL ENCOUNTER: Primary | ICD-10-CM

## 2021-10-15 PROCEDURE — 28510 TREATMENT OF TOE FRACTURE: CPT

## 2021-10-15 PROCEDURE — 99283 EMERGENCY DEPT VISIT LOW MDM: CPT

## 2021-10-15 PROCEDURE — 12001 RPR S/N/AX/GEN/TRNK 2.5CM/<: CPT

## 2021-10-15 PROCEDURE — 99284 EMERGENCY DEPT VISIT MOD MDM: CPT

## 2021-10-15 PROCEDURE — 73630 X-RAY EXAM OF FOOT: CPT | Performed by: EMERGENCY MEDICINE

## 2021-10-15 RX ORDER — TRANEXAMIC ACID 100 MG/ML
5 INJECTION, SOLUTION INTRAVENOUS ONCE
Status: COMPLETED | OUTPATIENT
Start: 2021-10-15 | End: 2021-10-15

## 2021-10-15 RX ORDER — CEPHALEXIN 500 MG/1
500 CAPSULE ORAL 4 TIMES DAILY
Qty: 28 CAPSULE | Refills: 0 | Status: SHIPPED | OUTPATIENT
Start: 2021-10-15 | End: 2021-10-22

## 2021-10-15 NOTE — ED PROVIDER NOTES
Patient Seen in: [facility] Emergency Department      History   Patient presents with:  Leg or Foot Injury  Laceration/Abrasion    Stated Complaint: toe injury    Subjective:   HPI    Patient was walking to the bathroom.   His legs gave out briefly a Unspecified urinary incontinence    • Ventricular arrhythmia    • Visual impairment               Past Surgical History:   Procedure Laterality Date   • APPENDECTOMY  1991   • CABG  1998   • CARDIAC DEFIBRILLATOR PLACEMENT      [employer], not pacer MANAGEMENT   • PATIENT DOCUMENTED NOT TO HAVE EXPERIENCED ANY OF THE FOLLOWING EVENTS  8/14/2013    Procedure: LUMBAR EPIDURAL;  Surgeon: Osbaldo Agrawal MD;  Location: [address] [place] [address]   • PATIENT [address] FOR IV ANTIBIOTI joint of the fourth and fifth digits on the plantar surface of the foot. No exposed bone. No foreign body. Distal cap refill intact. Distal sensation intact light touch. Nail and nail beds are unremarkable.   There is some tenderness around the MCP chris Prescribed:  Current Discharge Medication List    START taking these medications    cephalexin 500 MG Oral Cap  Take 1 capsule (500 mg total) by mouth 4 (four) times daily for 7 days.   Qty: 28 capsule Refills: 0

## 2021-10-16 VITALS
RESPIRATION RATE: 18 BRPM | OXYGEN SATURATION: 94 % | TEMPERATURE: 99 F | HEART RATE: 69 BPM | HEIGHT: 69 IN | DIASTOLIC BLOOD PRESSURE: 74 MMHG | SYSTOLIC BLOOD PRESSURE: 138 MMHG | BODY MASS INDEX: 35.7 KG/M2 | WEIGHT: 241 LBS

## 2021-10-16 NOTE — ED PROVIDER NOTES
Patient Seen in: [facility] Emergency Department      History   Patient presents with:   Foot Pain    Stated Complaint: reports he had stitches placed in his right foot this morning and they broke up*    Subjective:   HPI    68-year-old male presents • Unspecified urinary incontinence    • Ventricular arrhythmia    • Visual impairment               Past Surgical History:   Procedure Laterality Date   • APPENDECTOMY  1991   • CABG  1998   • CARDIAC DEFIBRILLATOR PLACEMENT      [employer], not p PAIN MANAGEMENT   • PATIENT DOCUMENTED NOT TO HAVE EXPERIENCED ANY OF THE FOLLOWING EVENTS  8/14/2013    Procedure: LUMBAR EPIDURAL;  Surgeon: Mireya Page MD;  Location: [facility] FOR PAIN MANAGEMENT   • PATIENT [address] FOR IV ANTI 94%   BMI 35.59 kg/m²         Physical Exam    GENERAL: Patient is awake, alert, well-appearing, in no acute distress. HEENT:  no scleral icterus. EXTREMITIES: No peripheral edema, no calf tenderness, dorsal pedal pulses present and equal bilaterally.

## 2021-10-16 NOTE — ED INITIAL ASSESSMENT (HPI)
Patient had an injury to his right foot and was seen earlier today and had sutures placed. Noticed increased bleeding since this am. Patient sts on blood thinner.

## 2021-10-18 ENCOUNTER — TELEPHONE (OUTPATIENT)
Dept: FAMILY MEDICINE CLINIC | Facility: CLINIC | Age: 71
End: 2021-10-18

## 2021-10-18 ENCOUNTER — OFFICE VISIT (OUTPATIENT)
Dept: FAMILY MEDICINE CLINIC | Facility: CLINIC | Age: 71
End: 2021-10-18
Payer: MEDICARE

## 2021-10-18 VITALS
WEIGHT: 241 LBS | DIASTOLIC BLOOD PRESSURE: 76 MMHG | HEIGHT: 69 IN | OXYGEN SATURATION: 96 % | RESPIRATION RATE: 16 BRPM | TEMPERATURE: 98 F | BODY MASS INDEX: 35.7 KG/M2 | SYSTOLIC BLOOD PRESSURE: 118 MMHG | HEART RATE: 72 BPM

## 2021-10-18 DIAGNOSIS — G35 MULTIPLE SCLEROSIS (HCC): ICD-10-CM

## 2021-10-18 DIAGNOSIS — S92.524B OPEN NONDISPLACED FRACTURE OF MIDDLE PHALANX OF LESSER TOE OF RIGHT FOOT, INITIAL ENCOUNTER: Primary | ICD-10-CM

## 2021-10-18 DIAGNOSIS — S91.311A LACERATION OF RIGHT FOOT, INITIAL ENCOUNTER: ICD-10-CM

## 2021-10-18 DIAGNOSIS — Z79.01 ANTICOAGULATED: ICD-10-CM

## 2021-10-18 DIAGNOSIS — I48.91 ATRIAL FIBRILLATION WITH NORMAL VENTRICULAR RATE (HCC): ICD-10-CM

## 2021-10-18 PROCEDURE — 1111F DSCHRG MED/CURRENT MED MERGE: CPT | Performed by: FAMILY MEDICINE

## 2021-10-18 PROCEDURE — 99214 OFFICE O/P EST MOD 30 MIN: CPT | Performed by: FAMILY MEDICINE

## 2021-10-18 NOTE — TELEPHONE ENCOUNTER
Went into ED 2x on 10/15/21. 2nd time due to uncontrolled bleeding.      Sahil sts dressing is not fully saturated   Denies pain at this time   Report he does not have any sensation in his toes but reports this is his baseline due to MS  Sts, \"I am not abl

## 2021-10-18 NOTE — TELEPHONE ENCOUNTER
We can see him today at 6 PM since I have cancellation for initial evaluation. If that would not work for him we can look at different day.

## 2021-10-18 NOTE — TELEPHONE ENCOUNTER
Call to pt's home phone. Reaches identified VM. Per HIPAA consent, LVM requesting call back to triage nurse today. Provided call back number and ofc phone hours.

## 2021-10-18 NOTE — TELEPHONE ENCOUNTER
Call to pt reaches amanda/spouse-on hipaa-advised of dr villanueva's appt offer for 6 pm today. Amanda voices understanding/agrees with plan/appt scheduled/no further questions.

## 2021-10-18 NOTE — TELEPHONE ENCOUNTER
Pt was in er Friday 10/15 for foot laceration. Was told to follow up right away for infection. Per Dr Gibran Trujillo please call and triage pt. Thank you.

## 2021-10-19 NOTE — PROGRESS NOTES
Mariluz Hsu is a 66year old male. cc fracture right toe, laceration of the toe, A. fib, anticoagulated  HPI:   Patient was seen in the emergency room on October 15, 2021.   He was at home was walking fell down his little toe got turned to the back, rest o tablet 0   • Losartan Potassium 25 MG Oral Tab Take 12.5 mg by mouth daily. • atorvastatin 40 MG Oral Tab Take 40 mg by mouth nightly. • Multiple Vitamins-Minerals (TAB-A-STEPHEN) Oral Tab Take 1 tablet by mouth daily.      • furosemide 20 MG Oral Tab [facility])    • Neuropathy    • Obesity, unspecified    • Other specified disorder of rectum and anus    • Peptic ulcer, unspecified site, unspecified as acute or chronic, without mention of hemorrhage, perforation, or obstruction 6/29/2012   • Personal history rashes,no suspicious lesions  HEENT: atraumatic, normocephalic,  NECK: supple,no adenopathy,  LUNGS: clear to auscultation  CARDIO: RRR without murmur  GI: good BS's,no masses, HSM or tenderness  EXTREMITIES: no  Edema  Musculoskeletal patient came with th 10/15/2021 at 9:04 AM       Finalized by (CST): Duane Carbajal MD on 10/15/2021 at 9:06 AM          ASSESSMENT AND PLAN:     Open nondisplaced fracture of middle phalanx of lesser toe of right foot, initial encounter  (primary encounter diagnosis)  Thomas Osorio

## 2021-10-19 NOTE — PATIENT INSTRUCTIONS
Continue cephalexin. Continue wearing orthopedic shoe. Take probiotic daily. Elevate leg. Follow-up on Friday for reevaluation and dressing change.

## 2021-10-22 ENCOUNTER — OFFICE VISIT (OUTPATIENT)
Dept: FAMILY MEDICINE CLINIC | Facility: CLINIC | Age: 71
End: 2021-10-22
Payer: MEDICARE

## 2021-10-22 VITALS
RESPIRATION RATE: 16 BRPM | OXYGEN SATURATION: 98 % | HEART RATE: 62 BPM | DIASTOLIC BLOOD PRESSURE: 66 MMHG | TEMPERATURE: 98 F | WEIGHT: 241 LBS | SYSTOLIC BLOOD PRESSURE: 124 MMHG | HEIGHT: 69 IN | BODY MASS INDEX: 35.7 KG/M2

## 2021-10-22 DIAGNOSIS — I48.91 ATRIAL FIBRILLATION WITH NORMAL VENTRICULAR RATE (HCC): ICD-10-CM

## 2021-10-22 DIAGNOSIS — Z79.01 ANTICOAGULATED: ICD-10-CM

## 2021-10-22 DIAGNOSIS — S92.524B OPEN NONDISPLACED FRACTURE OF MIDDLE PHALANX OF LESSER TOE OF RIGHT FOOT, INITIAL ENCOUNTER: ICD-10-CM

## 2021-10-22 DIAGNOSIS — S91.311A LACERATION OF RIGHT FOOT, INITIAL ENCOUNTER: Primary | ICD-10-CM

## 2021-10-22 PROCEDURE — 99213 OFFICE O/P EST LOW 20 MIN: CPT | Performed by: FAMILY MEDICINE

## 2021-10-22 RX ORDER — CEPHALEXIN 500 MG/1
500 CAPSULE ORAL 4 TIMES DAILY
Qty: 28 CAPSULE | Refills: 0 | Status: SHIPPED | OUTPATIENT
Start: 2021-10-22 | End: 2021-10-29

## 2021-10-23 NOTE — PROGRESS NOTES
Edel Carver is a 66year old male. cc laceration of the right foot, fracture of the little toe, anticoagulated on Coumadin due to A. fib  HPI:   After fall at homePatient is coming to the office for reevaluation.   He has laceration of the right foot fract Take 4,000 Units by mouth daily. • famotidine 20 MG Oral Tab Take 20 mg by mouth 2 (two) times daily. For 7 days only (Patient not taking: No sig reported)     • predniSONE 20 MG Oral Tab Take 40 mg by mouth daily.  For 5 days (Patient not taking: No Packs/day: 3.00        Years: 12.00        Pack years: 39        Types: Cigarettes        Quit date: 8/10/1977        Years since quittin.2      Smokeless tobacco: Never Used    Vaping Use      Vaping Use: Never used    Alcohol use:  Yes      Alcohol/w symptoms. Elevate leg. Use Tylenol as needed for pain. Return on Tuesday 8:15 AM to change dressing. Imaging & Consults:  None    The patient indicates understanding of these issues and agrees to the plan.   The patient is asked to return in Tue nex

## 2021-10-23 NOTE — PATIENT INSTRUCTIONS
Continue cephalexin 1 capsule every 6 hours. Monitor symptoms. Elevate leg. Use Tylenol as needed for pain. Return on Tuesday 8:15 AM to change dressing.

## 2021-10-26 ENCOUNTER — OFFICE VISIT (OUTPATIENT)
Dept: FAMILY MEDICINE CLINIC | Facility: CLINIC | Age: 71
End: 2021-10-26
Payer: MEDICARE

## 2021-10-26 VITALS — SYSTOLIC BLOOD PRESSURE: 100 MMHG | HEART RATE: 68 BPM | RESPIRATION RATE: 12 BRPM | DIASTOLIC BLOOD PRESSURE: 70 MMHG

## 2021-10-26 DIAGNOSIS — S91.311A LACERATION OF RIGHT FOOT, INITIAL ENCOUNTER: Primary | ICD-10-CM

## 2021-10-26 DIAGNOSIS — R21 RASH: ICD-10-CM

## 2021-10-26 PROCEDURE — 99213 OFFICE O/P EST LOW 20 MIN: CPT | Performed by: FAMILY MEDICINE

## 2021-10-26 RX ORDER — CLOTRIMAZOLE AND BETAMETHASONE DIPROPIONATE 10; .64 MG/G; MG/G
1 CREAM TOPICAL 2 TIMES DAILY PRN
Qty: 60 G | Refills: 0 | Status: SHIPPED | OUTPATIENT
Start: 2021-10-26

## 2021-10-26 RX ORDER — ASPIRIN 81 MG/1
81 TABLET ORAL DAILY
COMMUNITY

## 2021-10-26 NOTE — PATIENT INSTRUCTIONS
Use clotrimazole/betamethasone cream twice a day. Monitor symptoms. Return on Friday to remove sutures.

## 2021-10-26 NOTE — PROGRESS NOTES
Billy Guerra is a 66year old male. cc laceration of the right foot, f anticoagulated on Coumadin due to A. fib  HPI:   Patient is coming to the office for reevaluation. He has laceration of the right foot fracture of the little toe.   His dressing was gris • Multiple Vitamins-Minerals (TAB-A-STEPHEN) Oral Tab Take 1 tablet by mouth daily. • furosemide 20 MG Oral Tab Take 1 tablet (20 mg total) by mouth daily. 30 tablet 3   • Omega-3 Fatty Acids (OMEGA-3 OR) Take 1 capsule by mouth daily.        • David Almeida • Unspecified sleep apnea     wears CPAP   • Unspecified urinary incontinence    • Ventricular arrhythmia    • Visual impairment       Social History:  Social History    Tobacco Use      Smoking status: Former Smoker        Packs/day: 3.00        Years: • clotrimazole-betamethasone 1-0.05 % External Cream 60 g 0     Sig: Apply 1 Application topically 2 (two) times daily as needed. Use clotrimazole/betamethasone cream twice a day. Monitor symptoms. Return on Friday to remove sutures.     [facility]

## 2021-10-29 ENCOUNTER — OFFICE VISIT (OUTPATIENT)
Dept: FAMILY MEDICINE CLINIC | Facility: CLINIC | Age: 71
End: 2021-10-29
Payer: MEDICARE

## 2021-10-29 VITALS
SYSTOLIC BLOOD PRESSURE: 138 MMHG | WEIGHT: 241 LBS | DIASTOLIC BLOOD PRESSURE: 80 MMHG | TEMPERATURE: 98 F | BODY MASS INDEX: 35.7 KG/M2 | RESPIRATION RATE: 16 BRPM | HEIGHT: 69 IN | HEART RATE: 76 BPM

## 2021-10-29 DIAGNOSIS — G35 MULTIPLE SCLEROSIS (HCC): ICD-10-CM

## 2021-10-29 DIAGNOSIS — S91.311A LACERATION OF RIGHT FOOT, INITIAL ENCOUNTER: Primary | ICD-10-CM

## 2021-10-29 PROCEDURE — 99024 POSTOP FOLLOW-UP VISIT: CPT | Performed by: FAMILY MEDICINE

## 2021-10-30 NOTE — PROGRESS NOTES
Bere Chin is a 66year old male. cc laceration right foot toes, multiple sclerosis  HPI:   Patient comes to the office for evaluation of the laceration of the right foot toes.   He was seen in the emergency room on October 15.  patient had #5 interrupted (20 mg total) by mouth daily. 30 tablet 3   • Omega-3 Fatty Acids (OMEGA-3 OR) Take 1 capsule by mouth daily. • Ocrelizumab 300 MG/10ML Intravenous Solution Inject 600 mg into the vein every 6 (six) months.    2.8 mL 0   • Calcium Carbonate (CALCIUM 6 History:  Social History    Tobacco Use      Smoking status: Former Smoker        Packs/day: 3.00        Years: 12.00        Pack years: 36        Types: Cigarettes        Quit date: 8/10/1977        Years since quittin.2      Smokeless tobacco: Never his feet due to MS and location of the sutures between the toes. Patient did however well. Topical antibiotic applied. new dressing with Kerlix and Coban was reapplied.   Patient was recommended to change the dressing on Tuesday next week and okay to brianna

## 2021-11-04 ENCOUNTER — TELEPHONE (OUTPATIENT)
Dept: FAMILY MEDICINE CLINIC | Facility: CLINIC | Age: 71
End: 2021-11-04

## 2021-11-04 NOTE — TELEPHONE ENCOUNTER
Larry Barlow RN called from residential home [facility]. Dianelys Boyd pt is asking for home PT. Asking if you would sign off on orders. I gave verbal OK for this as we did see him last week for suture removal. Pt reports no pain per Larry Barlow.   Sending as Benny Fonseca

## 2021-11-09 ENCOUNTER — TELEPHONE (OUTPATIENT)
Dept: FAMILY MEDICINE CLINIC | Facility: CLINIC | Age: 71
End: 2021-11-09

## 2021-11-09 NOTE — TELEPHONE ENCOUNTER
Pt was seen for PT evaluation today. Pt is doing all of the exercises given previously and keeping up with the walking program. No change to physical or functional level noted. No future visits will be needed.      Any questions or concerns, please call

## 2021-11-10 ENCOUNTER — TELEPHONE (OUTPATIENT)
Dept: FAMILY MEDICINE CLINIC | Facility: CLINIC | Age: 71
End: 2021-11-10

## 2021-11-10 NOTE — TELEPHONE ENCOUNTER
Noted. I have personally performed a face to face diagnostic evaluation on this patient. I have reviewed the ACP note and agree with the history, exam and plan of care, except as noted.

## 2021-11-10 NOTE — TELEPHONE ENCOUNTER
We can have them to increase Lasix to 2 tablets a day for 3 days and see if that would help of the swelling. Patient should be taking additional potassium when on increased Lasix.   We may see patient back to remove that suture between the toes unless the

## 2021-11-10 NOTE — TELEPHONE ENCOUNTER
I left a detailed message on Shade's cell. I did call patient as well. He agrees to increase the Lasix to 40 mg a day x 3 days and take an extra K+ as well. He agrees to continue keeping feet elevated. He is aware to call with update in 3 days.  All questio

## 2021-11-10 NOTE — TELEPHONE ENCOUNTER
Evelyn Aleman there doing weekly assessment. R.leg edema 4+. Left leg edema 1+. Right is always worse than left and that leg is always \"heavier\" also due to sciatica and always hs trouble picking up that leg. Color is unchanged. Negative Homén's sign.  DVT has be

## 2021-11-15 ENCOUNTER — MED REC SCAN ONLY (OUTPATIENT)
Dept: FAMILY MEDICINE CLINIC | Facility: CLINIC | Age: 71
End: 2021-11-15

## 2021-11-24 ENCOUNTER — TELEPHONE (OUTPATIENT)
Dept: FAMILY MEDICINE CLINIC | Facility: CLINIC | Age: 71
End: 2021-11-24

## 2021-11-24 NOTE — TELEPHONE ENCOUNTER
Okay to increase Lasix to 40 mg daily for 3 days. Also increase potassium to 2 tablets a day (total dose 20-mEQ a day )  when on increased Lasix 40 mg.  Recheck BMP 2-3  days after stopping higher dose of Lasix.   Thank you

## 2021-11-24 NOTE — TELEPHONE ENCOUNTER
Left detailed message on LINDSAY Salvador. Also called pt and informed him of Dr. Cristian Echevarria advise below and he voiced understanding.

## 2021-11-24 NOTE — TELEPHONE ENCOUNTER
Moisés Araujo from [facility] called and states last week pt's Lasix was increase from 20 mg to 40 mg due to 4+ pitting edema x 3 days. Swelling was from right knee down his foot, after 3 days, it is now from his calf to his foot.   Pt has chronic

## 2021-12-02 ENCOUNTER — LAB REQUISITION (OUTPATIENT)
Dept: LAB | Facility: HOSPITAL | Age: 71
End: 2021-12-02
Payer: MEDICARE

## 2021-12-02 ENCOUNTER — MED REC SCAN ONLY (OUTPATIENT)
Dept: FAMILY MEDICINE CLINIC | Facility: CLINIC | Age: 71
End: 2021-12-02

## 2021-12-02 DIAGNOSIS — I11.0 HYPERTENSIVE HEART DISEASE WITH HEART FAILURE (HCC): ICD-10-CM

## 2021-12-02 DIAGNOSIS — I48.0 PAROXYSMAL ATRIAL FIBRILLATION (HCC): ICD-10-CM

## 2021-12-02 PROCEDURE — 80048 BASIC METABOLIC PNL TOTAL CA: CPT | Performed by: FAMILY MEDICINE

## 2021-12-03 ENCOUNTER — TELEPHONE (OUTPATIENT)
Dept: FAMILY MEDICINE CLINIC | Facility: CLINIC | Age: 71
End: 2021-12-03

## 2021-12-03 DIAGNOSIS — E87.6 HYPOKALEMIA: Primary | ICD-10-CM

## 2021-12-03 RX ORDER — FUROSEMIDE 40 MG/1
40 TABLET ORAL DAILY
Qty: 30 TABLET | Refills: 1 | Status: SHIPPED | OUTPATIENT
Start: 2021-12-03 | End: 2022-11-28

## 2021-12-03 RX ORDER — POTASSIUM CHLORIDE 1500 MG/1
20 TABLET, FILM COATED, EXTENDED RELEASE ORAL DAILY
Qty: 30 TABLET | Refills: 1 | Status: SHIPPED | OUTPATIENT
Start: 2021-12-03 | End: 2022-01-02

## 2021-12-03 NOTE — TELEPHONE ENCOUNTER
Antonio Parks called from [facility]. He was at the pt.s home for a [facility] visit. Mr. Winston's legs are very edematous today. The RLE has +2 pitting edema and the LLE has +4 pitting edema. The pt. Is also having an increase in his sciatica pain due to the extra fluid.  Luís Mendez LDL not quite at goal.  Discussed goal of less than 70.  Continue current meds at this time.  He will continue to work on diet modification exercise as tolerated

## 2021-12-03 NOTE — TELEPHONE ENCOUNTER
JADEN to [facility] at [facility]. [facility] regarding instructions below. Pt was also called and instructed to start furosemide 40 mg 1 daily and potassium chloride 20 meq 1 daily. rx's sent to [address] [place] [address] for pt.  Pt was also instructed to have a BMP done 2 weeks

## 2021-12-03 NOTE — TELEPHONE ENCOUNTER
Yes we Can increase Lasix to 40 mg 1 a day and potassium chloride 20 mEq daily  #30, 1 refill. I would like patient to recheck BMP in 2 weeks before Rashawn to make sure that electrolytes are okay. Elevate legs, low-salt diet.   Thank you

## 2021-12-23 ENCOUNTER — LAB ENCOUNTER (OUTPATIENT)
Dept: LAB | Facility: HOSPITAL | Age: 71
End: 2021-12-23
Attending: FAMILY MEDICINE
Payer: MEDICARE

## 2021-12-23 ENCOUNTER — TELEPHONE (OUTPATIENT)
Dept: FAMILY MEDICINE CLINIC | Facility: CLINIC | Age: 71
End: 2021-12-23

## 2021-12-23 DIAGNOSIS — E83.52 HYPERCALCEMIA: Primary | ICD-10-CM

## 2021-12-23 DIAGNOSIS — E87.6 HYPOKALEMIA: ICD-10-CM

## 2021-12-23 PROCEDURE — 80048 BASIC METABOLIC PNL TOTAL CA: CPT

## 2021-12-23 PROCEDURE — 36415 COLL VENOUS BLD VENIPUNCTURE: CPT

## 2021-12-23 NOTE — TELEPHONE ENCOUNTER
S/w pt on this. Per TE 12.3 pt was to take 20meq daily. Pt reports he has been taking 20 meq bid? He said this was increased to this at that time. No record of this. I advised he is overdue for a repeat bmp.  Advised he should have this done asap to det

## 2021-12-23 NOTE — TELEPHONE ENCOUNTER
Gisele Sullivan RN called. Xavi Galindo he shows that pt should be taking 20meq daily. I advised that pt said Dr Johnson Killings him to 20 meq bid? I advised he needs bmp to determine further dosing. He said he did not see any pills for 20 meq in pts pill supply.   He will call pt

## 2021-12-23 NOTE — TELEPHONE ENCOUNTER
Pt calling because his potassium was increased after last visit and he now only has 2 pills left. Pharmacy is telling him it's to early for a refill.  Pt requesting refill for      Potassium Chloride ER 20 MEQ Oral Tab CR 30 tablet 1 12/3/2021 1/2/2022    S

## 2021-12-23 NOTE — TELEPHONE ENCOUNTER
Received live call from Martha Bo [address] [place] [address]    Sts he spoke with [facility], and [facility] will try to go to [facility] labs to have BMP done today, or no later than tomorrow morning.      Confirms that pt is taking  20 mEq of potassium BID

## 2021-12-28 NOTE — TELEPHONE ENCOUNTER
Record shows pt had BMP drawn 12/23 w results reviewed by dr Beatrice Schwartz, who sent mychart messg to pt re results and recommendations.  Repeat labs ordered for mid-january

## 2022-01-15 ENCOUNTER — MED REC SCAN ONLY (OUTPATIENT)
Dept: FAMILY MEDICINE CLINIC | Facility: CLINIC | Age: 72
End: 2022-01-15

## 2022-02-07 ENCOUNTER — PATIENT OUTREACH (OUTPATIENT)
Dept: CASE MANAGEMENT | Age: 72
End: 2022-02-07

## 2022-02-08 ENCOUNTER — TELEPHONE (OUTPATIENT)
Dept: PODIATRY CLINIC | Facility: CLINIC | Age: 72
End: 2022-02-08

## 2022-02-08 NOTE — TELEPHONE ENCOUNTER
Attempted to contact patient via telephone but he did not answer. I would recommend stability or motion control style shoes for Mr. Bhavik Amanda. [employer] has models that fit this category. Thanks.

## 2022-02-08 NOTE — TELEPHONE ENCOUNTER
Patient was advised during the last office visit to get new pair of shoes. Patient states that the shoes he currently is using are from [employer] but they discontinued the shoe. Patient would like to know if there by chance we would know where he can get something similar to what he was using. Patient states he uses these specific shoes so that his foot does not roll over.      Please advise

## 2022-02-25 RX ORDER — POTASSIUM CHLORIDE 20 MEQ/1
TABLET, EXTENDED RELEASE ORAL
Qty: 30 TABLET | Refills: 1 | Status: SHIPPED | OUTPATIENT
Start: 2022-02-25

## 2022-03-14 ENCOUNTER — TELEPHONE (OUTPATIENT)
Dept: FAMILY MEDICINE CLINIC | Facility: CLINIC | Age: 72
End: 2022-03-14

## 2022-03-14 ENCOUNTER — HOSPITAL ENCOUNTER (OUTPATIENT)
Facility: HOSPITAL | Age: 72
Setting detail: OBSERVATION
Discharge: HOME HEALTH | End: 2022-03-17
Attending: EMERGENCY MEDICINE | Admitting: HOSPITALIST
Payer: MEDICARE

## 2022-03-14 ENCOUNTER — HOSPITAL ENCOUNTER (OUTPATIENT)
Age: 72
Discharge: ANOTHER HOSPITAL | End: 2022-03-14
Attending: EMERGENCY MEDICINE
Payer: MEDICARE

## 2022-03-14 ENCOUNTER — APPOINTMENT (OUTPATIENT)
Dept: CT IMAGING | Age: 72
End: 2022-03-14
Attending: EMERGENCY MEDICINE
Payer: MEDICARE

## 2022-03-14 VITALS
BODY MASS INDEX: 35.31 KG/M2 | HEIGHT: 67 IN | OXYGEN SATURATION: 97 % | WEIGHT: 225 LBS | HEART RATE: 69 BPM | DIASTOLIC BLOOD PRESSURE: 79 MMHG | TEMPERATURE: 98 F | SYSTOLIC BLOOD PRESSURE: 161 MMHG | RESPIRATION RATE: 18 BRPM

## 2022-03-14 DIAGNOSIS — S30.1XXA ABDOMINAL WALL HEMATOMA, INITIAL ENCOUNTER: Primary | ICD-10-CM

## 2022-03-14 DIAGNOSIS — Z79.01 ANTICOAGULANT LONG-TERM USE: ICD-10-CM

## 2022-03-14 LAB
#MXD IC: 1.2 X10ˆ3/UL (ref 0.1–1)
ANTIBODY SCREEN: NEGATIVE
BUN BLD-MCNC: 15 MG/DL (ref 7–18)
CHLORIDE BLD-SCNC: 100 MMOL/L (ref 98–112)
CO2 BLD-SCNC: 27 MMOL/L (ref 21–32)
CREAT BLD-MCNC: 1 MG/DL
GLUCOSE BLD-MCNC: 76 MG/DL (ref 70–99)
HCT VFR BLD CALC: 45 %
HGB BLD-MCNC: 14.5 G/DL
ISTAT IONIZED CALCIUM FOR CHEM 8: 1.21 MMOL/L (ref 1.12–1.32)
LYMPHOCYTES # BLD AUTO: 2.8 X10ˆ3/UL (ref 1–4)
LYMPHOCYTES NFR BLD AUTO: 23.8 %
MCH RBC QN AUTO: 31.3 PG (ref 26–34)
MCHC RBC AUTO-ENTMCNC: 32.2 G/DL (ref 31–37)
MCV RBC AUTO: 97 FL (ref 80–100)
MIXED CELL %: 10 %
NEUTROPHILS # BLD AUTO: 7.6 X10ˆ3/UL (ref 1.5–7.7)
NEUTROPHILS NFR BLD AUTO: 66.2 %
PLATELET # BLD AUTO: 205 X10ˆ3/UL (ref 150–450)
POTASSIUM BLD-SCNC: 4 MMOL/L (ref 3.6–5.1)
RBC # BLD AUTO: 4.64 X10ˆ6/UL
RH BLOOD TYPE: POSITIVE
SARS-COV-2 RNA RESP QL NAA+PROBE: NOT DETECTED
SODIUM BLD-SCNC: 138 MMOL/L (ref 136–145)
WBC # BLD AUTO: 11.6 X10ˆ3/UL (ref 4–11)

## 2022-03-14 PROCEDURE — 80047 BASIC METABLC PNL IONIZED CA: CPT

## 2022-03-14 PROCEDURE — 99220 INITIAL OBSERVATION CARE,LEVL III: CPT | Performed by: INTERNAL MEDICINE

## 2022-03-14 PROCEDURE — 99215 OFFICE O/P EST HI 40 MIN: CPT

## 2022-03-14 PROCEDURE — 36415 COLL VENOUS BLD VENIPUNCTURE: CPT

## 2022-03-14 PROCEDURE — 74177 CT ABD & PELVIS W/CONTRAST: CPT | Performed by: EMERGENCY MEDICINE

## 2022-03-14 PROCEDURE — 85025 COMPLETE CBC W/AUTO DIFF WBC: CPT | Performed by: EMERGENCY MEDICINE

## 2022-03-14 RX ORDER — MORPHINE SULFATE 4 MG/ML
4 INJECTION, SOLUTION INTRAMUSCULAR; INTRAVENOUS ONCE
Status: COMPLETED | OUTPATIENT
Start: 2022-03-14 | End: 2022-03-14

## 2022-03-14 NOTE — ED INITIAL ASSESSMENT (HPI)
Yesterday, c/o RUQ abdominal pain/lump in the area. Today pt describes constant increased pain. Denies N/V, diarrhea/constipation. Pt unsure of cause.

## 2022-03-15 PROBLEM — R07.9 CHEST PAIN: Status: ACTIVE | Noted: 2017-05-19

## 2022-03-15 LAB
ANION GAP SERPL CALC-SCNC: 3 MMOL/L (ref 0–18)
ANION GAP SERPL CALC-SCNC: 5 MMOL/L (ref 0–18)
BASOPHILS # BLD AUTO: 0.03 X10(3) UL (ref 0–0.2)
BASOPHILS # BLD AUTO: 0.05 X10(3) UL (ref 0–0.2)
BASOPHILS NFR BLD AUTO: 0.3 %
BASOPHILS NFR BLD AUTO: 0.6 %
BUN BLD-MCNC: 10 MG/DL (ref 7–18)
BUN BLD-MCNC: 12 MG/DL (ref 7–18)
CALCIUM BLD-MCNC: 9.2 MG/DL (ref 8.5–10.1)
CALCIUM BLD-MCNC: 9.3 MG/DL (ref 8.5–10.1)
CHLORIDE SERPL-SCNC: 108 MMOL/L (ref 98–112)
CHLORIDE SERPL-SCNC: 109 MMOL/L (ref 98–112)
CHOLEST SERPL-MCNC: 91 MG/DL (ref ?–200)
CO2 SERPL-SCNC: 27 MMOL/L (ref 21–32)
CO2 SERPL-SCNC: 28 MMOL/L (ref 21–32)
CREAT BLD-MCNC: 0.81 MG/DL
CREAT BLD-MCNC: 0.86 MG/DL
D DIMER PPP FEU-MCNC: <0.27 UG/ML FEU (ref ?–0.73)
EOSINOPHIL # BLD AUTO: 0.17 X10(3) UL (ref 0–0.7)
EOSINOPHIL # BLD AUTO: 0.17 X10(3) UL (ref 0–0.7)
EOSINOPHIL NFR BLD AUTO: 1.9 %
EOSINOPHIL NFR BLD AUTO: 1.9 %
ERYTHROCYTE [DISTWIDTH] IN BLOOD BY AUTOMATED COUNT: 14.4 %
ERYTHROCYTE [DISTWIDTH] IN BLOOD BY AUTOMATED COUNT: 14.6 %
GLUCOSE BLD-MCNC: 108 MG/DL (ref 70–99)
GLUCOSE BLD-MCNC: 69 MG/DL (ref 70–99)
GLUCOSE BLD-MCNC: 83 MG/DL (ref 70–99)
GLUCOSE BLD-MCNC: 87 MG/DL (ref 70–99)
GLUCOSE BLD-MCNC: 95 MG/DL (ref 70–99)
HCT VFR BLD AUTO: 40.6 %
HCT VFR BLD AUTO: 41.5 %
HDLC SERPL-MCNC: 44 MG/DL (ref 40–59)
HGB BLD-MCNC: 13.5 G/DL
HGB BLD-MCNC: 13.9 G/DL
IMM GRANULOCYTES # BLD AUTO: 0.05 X10(3) UL (ref 0–1)
IMM GRANULOCYTES # BLD AUTO: 0.06 X10(3) UL (ref 0–1)
IMM GRANULOCYTES NFR BLD: 0.6 %
IMM GRANULOCYTES NFR BLD: 0.7 %
LDLC SERPL CALC-MCNC: 32 MG/DL (ref ?–100)
LYMPHOCYTES # BLD AUTO: 2.45 X10(3) UL (ref 1–4)
LYMPHOCYTES # BLD AUTO: 2.58 X10(3) UL (ref 1–4)
LYMPHOCYTES NFR BLD AUTO: 28.7 %
MAGNESIUM SERPL-MCNC: 2.1 MG/DL (ref 1.6–2.6)
MCH RBC QN AUTO: 31.3 PG (ref 26–34)
MCH RBC QN AUTO: 31.9 PG (ref 26–34)
MCHC RBC AUTO-ENTMCNC: 33.3 G/DL (ref 31–37)
MCHC RBC AUTO-ENTMCNC: 33.5 G/DL (ref 31–37)
MCV RBC AUTO: 94.2 FL
MCV RBC AUTO: 95.2 FL
MONOCYTES # BLD AUTO: 1.19 X10(3) UL (ref 0.1–1)
MONOCYTES # BLD AUTO: 1.2 X10(3) UL (ref 0.1–1)
MONOCYTES NFR BLD AUTO: 13.3 %
MONOCYTES NFR BLD AUTO: 13.3 %
NEUTROPHILS # BLD AUTO: 4.95 X10 (3) UL (ref 1.5–7.7)
NEUTROPHILS # BLD AUTO: 4.95 X10(3) UL (ref 1.5–7.7)
NEUTROPHILS # BLD AUTO: 5.08 X10 (3) UL (ref 1.5–7.7)
NEUTROPHILS # BLD AUTO: 5.08 X10(3) UL (ref 1.5–7.7)
NEUTROPHILS NFR BLD AUTO: 55.1 %
NEUTROPHILS NFR BLD AUTO: 56.4 %
NONHDLC SERPL-MCNC: 47 MG/DL (ref ?–130)
OSMOLALITY SERPL CALC.SUM OF ELEC: 287 MOSM/KG (ref 275–295)
OSMOLALITY SERPL CALC.SUM OF ELEC: 289 MOSM/KG (ref 275–295)
PHOSPHATE SERPL-MCNC: 2.8 MG/DL (ref 2.5–4.9)
PLATELET # BLD AUTO: 171 10(3)UL (ref 150–450)
PLATELET # BLD AUTO: 176 10(3)UL (ref 150–450)
POTASSIUM SERPL-SCNC: 3.6 MMOL/L (ref 3.5–5.1)
POTASSIUM SERPL-SCNC: 3.9 MMOL/L (ref 3.5–5.1)
RBC # BLD AUTO: 4.31 X10(6)UL
RBC # BLD AUTO: 4.36 X10(6)UL
SODIUM SERPL-SCNC: 140 MMOL/L (ref 136–145)
SODIUM SERPL-SCNC: 140 MMOL/L (ref 136–145)
TRIGL SERPL-MCNC: 67 MG/DL (ref 30–149)
TROPONIN I HIGH SENSITIVITY: 159 NG/L
VLDLC SERPL CALC-MCNC: 9 MG/DL (ref 0–30)
WBC # BLD AUTO: 9 X10(3) UL (ref 4–11)
WBC # BLD AUTO: 9 X10(3) UL (ref 4–11)

## 2022-03-15 PROCEDURE — 99226 SUBSEQUENT OBSERVATION CARE: CPT | Performed by: INTERNAL MEDICINE

## 2022-03-15 PROCEDURE — 99202 OFFICE O/P NEW SF 15 MIN: CPT | Performed by: SURGERY

## 2022-03-15 RX ORDER — METOCLOPRAMIDE HYDROCHLORIDE 5 MG/ML
10 INJECTION INTRAMUSCULAR; INTRAVENOUS EVERY 8 HOURS PRN
Status: DISCONTINUED | OUTPATIENT
Start: 2022-03-15 | End: 2022-03-17

## 2022-03-15 RX ORDER — ASCORBIC ACID 500 MG
1000 TABLET ORAL DAILY
Status: DISCONTINUED | OUTPATIENT
Start: 2022-03-15 | End: 2022-03-17

## 2022-03-15 RX ORDER — DOXEPIN HYDROCHLORIDE 50 MG/1
1 CAPSULE ORAL DAILY
Status: DISCONTINUED | OUTPATIENT
Start: 2022-03-15 | End: 2022-03-17

## 2022-03-15 RX ORDER — POTASSIUM CHLORIDE 20 MEQ/1
20 TABLET, EXTENDED RELEASE ORAL DAILY
Status: DISCONTINUED | OUTPATIENT
Start: 2022-03-15 | End: 2022-03-17

## 2022-03-15 RX ORDER — ATORVASTATIN CALCIUM 40 MG/1
40 TABLET, FILM COATED ORAL NIGHTLY
Status: DISCONTINUED | OUTPATIENT
Start: 2022-03-15 | End: 2022-03-17

## 2022-03-15 RX ORDER — ONDANSETRON 2 MG/ML
4 INJECTION INTRAMUSCULAR; INTRAVENOUS EVERY 6 HOURS PRN
Status: DISCONTINUED | OUTPATIENT
Start: 2022-03-15 | End: 2022-03-17

## 2022-03-15 RX ORDER — MORPHINE SULFATE 4 MG/ML
4 INJECTION, SOLUTION INTRAMUSCULAR; INTRAVENOUS EVERY 2 HOUR PRN
Status: DISCONTINUED | OUTPATIENT
Start: 2022-03-14 | End: 2022-03-17

## 2022-03-15 RX ORDER — SODIUM CHLORIDE 9 MG/ML
INJECTION, SOLUTION INTRAVENOUS CONTINUOUS
Status: DISCONTINUED | OUTPATIENT
Start: 2022-03-15 | End: 2022-03-17

## 2022-03-15 RX ORDER — MORPHINE SULFATE 2 MG/ML
2 INJECTION, SOLUTION INTRAMUSCULAR; INTRAVENOUS EVERY 2 HOUR PRN
Status: DISCONTINUED | OUTPATIENT
Start: 2022-03-14 | End: 2022-03-17

## 2022-03-15 RX ORDER — GABAPENTIN 100 MG/1
100 CAPSULE ORAL 3 TIMES DAILY
Status: DISCONTINUED | OUTPATIENT
Start: 2022-03-15 | End: 2022-03-17

## 2022-03-15 RX ORDER — FAMOTIDINE 20 MG/1
20 TABLET, FILM COATED ORAL 2 TIMES DAILY
Status: DISCONTINUED | OUTPATIENT
Start: 2022-03-15 | End: 2022-03-17

## 2022-03-15 RX ORDER — AMIODARONE HYDROCHLORIDE 200 MG/1
200 TABLET ORAL DAILY
Status: DISCONTINUED | OUTPATIENT
Start: 2022-03-15 | End: 2022-03-17

## 2022-03-15 RX ORDER — LOSARTAN POTASSIUM 25 MG/1
12.5 TABLET ORAL DAILY
Status: DISCONTINUED | OUTPATIENT
Start: 2022-03-15 | End: 2022-03-17

## 2022-03-15 RX ORDER — MORPHINE SULFATE 2 MG/ML
1 INJECTION, SOLUTION INTRAMUSCULAR; INTRAVENOUS EVERY 2 HOUR PRN
Status: DISCONTINUED | OUTPATIENT
Start: 2022-03-14 | End: 2022-03-17

## 2022-03-15 NOTE — PLAN OF CARE
Problem: CARDIOVASCULAR - ADULT  Goal: Maintains optimal cardiac output and hemodynamic stability  Description: INTERVENTIONS:  - Monitor vital signs, rhythm, and trends  - Monitor for bleeding, hypotension and signs of decreased cardiac output  - Evaluate effectiveness of vasoactive medications to optimize hemodynamic stability  - Monitor arterial and/or venous puncture sites for bleeding and/or hematoma  - Assess quality of pulses, skin color and temperature  - Assess for signs of decreased coronary artery perfusion - ex. Angina  - Evaluate fluid balance, assess for edema, trend weights  Outcome: Progressing     Problem: RESPIRATORY - ADULT  Goal: Achieves optimal ventilation and oxygenation  Description: INTERVENTIONS:  - Assess for changes in respiratory status  - Assess for changes in mentation and behavior  - Position to facilitate oxygenation and minimize respiratory effort  - Oxygen supplementation based on oxygen saturation or ABGs  - Provide Smoking Cessation handout, if applicable  - Encourage broncho-pulmonary hygiene including cough, deep breathe, Incentive Spirometry  - Assess the need for suctioning and perform as needed  - Assess and instruct to report SOB or any respiratory difficulty  - Respiratory Therapy support as indicated  - Manage/alleviate anxiety  - Monitor for signs/symptoms of CO2 retention  Outcome: Progressing  Note: Patient is on room air, patient uses CPAP at night. RN will continue to monitor. Problem: SAFETY ADULT - FALL  Goal: Free from fall injury  Description: INTERVENTIONS:  - Assess pt frequently for physical needs  - Identify cognitive and physical deficits and behaviors that affect risk of falls.   - [place] fall precautions as indicated by assessment.  - Educate pt/family on patient safety including physical limitations  - Instruct pt to call for assistance with activity based on assessment  - Modify environment to reduce risk of injury  - Provide assistive devices as appropriate  - Consider OT/PT consult to assist with strengthening/mobility  - Encourage toileting schedule  Outcome: Progressing  Note: Patient is educated on fall risk safety, lighting adequate, near nurses station, traction socks on, bed at lowest position, RN will continue to monitor for fall risk.

## 2022-03-15 NOTE — PLAN OF CARE
Pt A&Ox4 , afebrile, BP controlled with scheduled meds (see MAR), on RA this am (now on [facility]), a -paced on tele, reports of abdominal pain and chest pain. Pain treated with medications when pt wanted pain intervention (see MAR). Abdominal hematoma marked with marker to monitor progress. Abdomen rounded and soft other than firm area where hematoma is. Abdomen tender. See flowsheets for full assessment. NPO this am, now on cardiac diet. No overt signs of bleeding, hgb q8h have been >8. Hypoglycemia protocol initiated. Pt reported symptoms of heart fluttering, chest pain, trouble breathing later in shift. Vitals were taken immediately and all vitals were WNL, MDs notified. Cards consulted. troponin elevated, trending troponins. EKG and repeat EKG completed. Plan for echo tomorrow. Pt reports less pain and more ease of breathing but requested to keep the [facility] on for now. Pt and spouse updated on plan of care at bedside. Safety precautions contd. Bed in lowest position, bed alarm on, belongings and call light within reach.

## 2022-03-15 NOTE — ED QUICK NOTES
Orders for admission, patient is aware of plan and ready to go upstairs. Any questions, please call ED RN Sandy Campos  at extension [phone]. Vaccinated?  yes  Type of COVID test sent:rapid  COVID Suspicion level: Low/High  low    Titratable drug(s) infusing:  Rate:    LOC at time of transport:  aox4  Other pertinent information:    CIWA score=  NIH score=

## 2022-03-15 NOTE — PROGRESS NOTES
Patient admitted to telemetry for pain in abd. CT of the abdomen/pelvis did reveal right rectus sheath hematoma measuring 5.4 x 10.9 x 12.8 cm. Patient is NPO, admission completed, orders received and carried out. Immunizations are up to date, Patient is A & O x 4, Patient resting comfortably in bed, call light next to patient. Will continue to monitor.

## 2022-03-16 ENCOUNTER — APPOINTMENT (OUTPATIENT)
Dept: CV DIAGNOSTICS | Facility: HOSPITAL | Age: 72
End: 2022-03-16
Attending: NURSE PRACTITIONER
Payer: MEDICARE

## 2022-03-16 LAB
ATRIAL RATE: 66 BPM
ATRIAL RATE: 66 BPM
HGB BLD-MCNC: 12.7 G/DL
HGB BLD-MCNC: 13.6 G/DL
P AXIS: -14 DEGREES
P AXIS: -31 DEGREES
P-R INTERVAL: 222 MS
P-R INTERVAL: 242 MS
Q-T INTERVAL: 408 MS
Q-T INTERVAL: 428 MS
QRS DURATION: 102 MS
QRS DURATION: 104 MS
QTC CALCULATION (BEZET): 427 MS
QTC CALCULATION (BEZET): 448 MS
R AXIS: 47 DEGREES
T AXIS: 78 DEGREES
T AXIS: 92 DEGREES
TROPONIN I HIGH SENSITIVITY: 168 NG/L
VENTRICULAR RATE: 66 BPM
VENTRICULAR RATE: 66 BPM

## 2022-03-16 PROCEDURE — 93306 TTE W/DOPPLER COMPLETE: CPT | Performed by: NURSE PRACTITIONER

## 2022-03-16 PROCEDURE — 99225 SUBSEQUENT OBSERVATION CARE: CPT | Performed by: HOSPITALIST

## 2022-03-16 PROCEDURE — 99225 SUBSEQUENT OBSERVATION CARE: CPT | Performed by: SURGERY

## 2022-03-16 NOTE — CM/SW NOTE
MSW met with pt, he is from home with spouse. He states hx of [facility] and thought it was helpful. He is open to [facility], MSW will f/u in am.    [facility] referral made and pending.

## 2022-03-16 NOTE — PLAN OF CARE
Assumed care at [facility]. A/O x4. NSR/ Atrial paced on tele. 2L NC, cpap at night. Continent, voids. Denies pain and shortness of breath at this time. Tenderness on abd over hematoma. L hand PIV c/d/i, infusing 0.9 at 75 ml/hr. Non card elec protocol. Cardiac diet. Repeat troponin at 2135 was 159. Hospitalist and cardiology notified. Pending 2D echo. Safety prec in place. Needs being met at this time. Problem: CARDIOVASCULAR - ADULT  Goal: Maintains optimal cardiac output and hemodynamic stability  Description: INTERVENTIONS:  - Monitor vital signs, rhythm, and trends  - Monitor for bleeding, hypotension and signs of decreased cardiac output  - Evaluate effectiveness of vasoactive medications to optimize hemodynamic stability  - Monitor arterial and/or venous puncture sites for bleeding and/or hematoma  - Assess quality of pulses, skin color and temperature  - Assess for signs of decreased coronary artery perfusion - ex.  Angina  - Evaluate fluid balance, assess for edema, trend weights  Outcome: Progressing  Goal: Absence of cardiac arrhythmias or at baseline  Description: INTERVENTIONS:  - Continuous cardiac monitoring, monitor vital signs, obtain 12 lead EKG if indicated  - Evaluate effectiveness of antiarrhythmic and heart rate control medications as ordered  - Initiate emergency measures for life threatening arrhythmias  - Monitor electrolytes and administer replacement therapy as ordered  Outcome: Progressing     Problem: RESPIRATORY - ADULT  Goal: Achieves optimal ventilation and oxygenation  Description: INTERVENTIONS:  - Assess for changes in respiratory status  - Assess for changes in mentation and behavior  - Position to facilitate oxygenation and minimize respiratory effort  - Oxygen supplementation based on oxygen saturation or ABGs  - Provide Smoking Cessation handout, if applicable  - Encourage broncho-pulmonary hygiene including cough, deep breathe, Incentive Spirometry  - Assess the need for suctioning and perform as needed  - Assess and instruct to report SOB or any respiratory difficulty  - Respiratory Therapy support as indicated  - Manage/alleviate anxiety  - Monitor for signs/symptoms of CO2 retention  Outcome: Progressing     Problem: SAFETY ADULT - FALL  Goal: Free from fall injury  Description: INTERVENTIONS:  - Assess pt frequently for physical needs  - Identify cognitive and physical deficits and behaviors that affect risk of falls.   - [place] fall precautions as indicated by assessment.  - Educate pt/family on patient safety including physical limitations  - Instruct pt to call for assistance with activity based on assessment  - Modify environment to reduce risk of injury  - Provide assistive devices as appropriate  - Consider OT/PT consult to assist with strengthening/mobility  - Encourage toileting schedule  Outcome: Progressing

## 2022-03-16 NOTE — CM/SW NOTE
MSW, Charge Rn and RN discussed patient's post d/c needs in care rounds. No identified needs at this time, MSW will remain available should needs change.     Plan of Care/Barriers to discharge:  Pt currently obs status  PT pending    [address] with spouse

## 2022-03-16 NOTE — PLAN OF CARE
[facility] care 0730. Alert and oriented x4. Echo completed. Tele- NSR/Afib. ICD needs to be interrogated. PIV infusing 0.9 @ 75 ml/hr. RA- Cpap at night. Eliquis on hold. Per surgery- stop trending hgb. Continent bowel and urine. STBY with walker. Abdomen- tender. C/o hip pain. PRN pain medications given. See MAR. Electrolyte noncardiac protocol. Denies chest pain. Continue to monitor pt. Problem: CARDIOVASCULAR - ADULT  Goal: Maintains optimal cardiac output and hemodynamic stability  Description: INTERVENTIONS:  - Monitor vital signs, rhythm, and trends  - Monitor for bleeding, hypotension and signs of decreased cardiac output  - Evaluate effectiveness of vasoactive medications to optimize hemodynamic stability  - Monitor arterial and/or venous puncture sites for bleeding and/or hematoma  - Assess quality of pulses, skin color and temperature  - Assess for signs of decreased coronary artery perfusion - ex.  Angina  - Evaluate fluid balance, assess for edema, trend weights  Outcome: Progressing  Goal: Absence of cardiac arrhythmias or at baseline  Description: INTERVENTIONS:  - Continuous cardiac monitoring, monitor vital signs, obtain 12 lead EKG if indicated  - Evaluate effectiveness of antiarrhythmic and heart rate control medications as ordered  - Initiate emergency measures for life threatening arrhythmias  - Monitor electrolytes and administer replacement therapy as ordered  Outcome: Progressing     Problem: RESPIRATORY - ADULT  Goal: Achieves optimal ventilation and oxygenation  Description: INTERVENTIONS:  - Assess for changes in respiratory status  - Assess for changes in mentation and behavior  - Position to facilitate oxygenation and minimize respiratory effort  - Oxygen supplementation based on oxygen saturation or ABGs  - Provide Smoking Cessation handout, if applicable  - Encourage broncho-pulmonary hygiene including cough, deep breathe, Incentive Spirometry  - Assess the need for suctioning and perform as needed  - Assess and instruct to report SOB or any respiratory difficulty  - Respiratory Therapy support as indicated  - Manage/alleviate anxiety  - Monitor for signs/symptoms of CO2 retention  Outcome: Progressing     Problem: SAFETY ADULT - FALL  Goal: Free from fall injury  Description: INTERVENTIONS:  - Assess pt frequently for physical needs  - Identify cognitive and physical deficits and behaviors that affect risk of falls.   - [place] fall precautions as indicated by assessment.  - Educate pt/family on patient safety including physical limitations  - Instruct pt to call for assistance with activity based on assessment  - Modify environment to reduce risk of injury  - Provide assistive devices as appropriate  - Consider OT/PT consult to assist with strengthening/mobility  - Encourage toileting schedule  Outcome: Progressing

## 2022-03-17 ENCOUNTER — TELEPHONE (OUTPATIENT)
Dept: FAMILY MEDICINE CLINIC | Facility: CLINIC | Age: 72
End: 2022-03-17

## 2022-03-17 VITALS
HEIGHT: 69 IN | TEMPERATURE: 98 F | RESPIRATION RATE: 18 BRPM | BODY MASS INDEX: 33.33 KG/M2 | WEIGHT: 225 LBS | HEART RATE: 72 BPM | SYSTOLIC BLOOD PRESSURE: 131 MMHG | DIASTOLIC BLOOD PRESSURE: 59 MMHG | OXYGEN SATURATION: 97 %

## 2022-03-17 PROCEDURE — 99217 OBSERVATION CARE DISCHARGE: CPT | Performed by: HOSPITALIST

## 2022-03-17 NOTE — HOME CARE LIAISON
Received referral via [facility] for Home Health services. Spoke w/ patient and provided with list of [facility] providers from [place], patient choice is [facility]. Agency reserved in [place] and contact information placed on AVS.Financial interest disclosure provided to patient. Notified SW.

## 2022-03-17 NOTE — PROGRESS NOTES
NURSING DISCHARGE NOTE    Discharged Home via Ambulatory. Accompanied by Spouse  Belongings Taken by patient/family. Pt assessed and ready for discharge. Discharge instructions and medications given and reviewed with patient. Written copy provided. All questions and concerns addressed. Pt to go home with [address] [place] [address]

## 2022-03-17 NOTE — TELEPHONE ENCOUNTER
S/w Nancy/ [facility]. Informed her that  is agreeable to assume responsibility for pts [address] needs. Call to pts home phone. Reaches Savanna/wife. On HIPAA consent. She sts she would like to schedule HFU for [facility]. He will be getting discharged from the hospital today. HFU scheduled for Monday, 3/21/22.

## 2022-03-17 NOTE — TELEPHONE ENCOUNTER
Raghav from [facility] calling to verify that Dr. Alfonza Meigs will assume responsibility for patient's home health needs. Please advise.

## 2022-03-17 NOTE — PROGRESS NOTES
Patient seen and examined. Medically clear to discharge today. Gen: NAD  Resp: CTA  CVS: s1s2  Abd: soft, +bowel sounds  Neck: trachea is midline      abd wall hematoma: stable  A. Fib: rate stable. On DOAC  HTN: stable  CAD: stable. On statin    35 minutes spent on discharge planning.       Lisbet Brown MD

## 2022-03-17 NOTE — PLAN OF CARE
Assumed care at [address] [facility]. A/O x4. NSR/ Atrial paced on tele. RA, cpap at night. Continent, voids. C/o pain in R hip/leg, prn morphine given per MAR. Currently denies chest pain or shortness of breath. Up w/ SB and walker. Tenderness in R side of abd. L hand PIV c/d/i, infusing 0.9 at 50 ml/hr. Non card elec protocol. Cardiac diet. Eliquis resumed. PT rec home w/ HH PT. Safety prec in place. Needs being met at this time. Problem: CARDIOVASCULAR - ADULT  Goal: Maintains optimal cardiac output and hemodynamic stability  Description: INTERVENTIONS:  - Monitor vital signs, rhythm, and trends  - Monitor for bleeding, hypotension and signs of decreased cardiac output  - Evaluate effectiveness of vasoactive medications to optimize hemodynamic stability  - Monitor arterial and/or venous puncture sites for bleeding and/or hematoma  - Assess quality of pulses, skin color and temperature  - Assess for signs of decreased coronary artery perfusion - ex.  Angina  - Evaluate fluid balance, assess for edema, trend weights  Outcome: Progressing  Goal: Absence of cardiac arrhythmias or at baseline  Description: INTERVENTIONS:  - Continuous cardiac monitoring, monitor vital signs, obtain 12 lead EKG if indicated  - Evaluate effectiveness of antiarrhythmic and heart rate control medications as ordered  - Initiate emergency measures for life threatening arrhythmias  - Monitor electrolytes and administer replacement therapy as ordered  Outcome: Progressing     Problem: RESPIRATORY - ADULT  Goal: Achieves optimal ventilation and oxygenation  Description: INTERVENTIONS:  - Assess for changes in respiratory status  - Assess for changes in mentation and behavior  - Position to facilitate oxygenation and minimize respiratory effort  - Oxygen supplementation based on oxygen saturation or ABGs  - Provide Smoking Cessation handout, if applicable  - Encourage broncho-pulmonary hygiene including cough, deep breathe, Incentive Spirometry  - Assess the need for suctioning and perform as needed  - Assess and instruct to report SOB or any respiratory difficulty  - Respiratory Therapy support as indicated  - Manage/alleviate anxiety  - Monitor for signs/symptoms of CO2 retention  Outcome: Progressing     Problem: SAFETY ADULT - FALL  Goal: Free from fall injury  Description: INTERVENTIONS:  - Assess pt frequently for physical needs  - Identify cognitive and physical deficits and behaviors that affect risk of falls.   - [place] fall precautions as indicated by assessment.  - Educate pt/family on patient safety including physical limitations  - Instruct pt to call for assistance with activity based on assessment  - Modify environment to reduce risk of injury  - Provide assistive devices as appropriate  - Consider OT/PT consult to assist with strengthening/mobility  - Encourage toileting schedule  Outcome: Progressing

## 2022-03-18 ENCOUNTER — TELEPHONE (OUTPATIENT)
Dept: FAMILY MEDICINE CLINIC | Facility: CLINIC | Age: 72
End: 2022-03-18

## 2022-03-18 ENCOUNTER — PATIENT OUTREACH (OUTPATIENT)
Dept: CASE MANAGEMENT | Age: 72
End: 2022-03-18

## 2022-03-18 PROCEDURE — 1111F DSCHRG MED/CURRENT MED MERGE: CPT

## 2022-03-18 NOTE — TELEPHONE ENCOUNTER
S/w patient for TCM. He was dc'd on 3/17/22 (Abdominal wall hematoma-no surgical intervention). He continues to have pain and Tylenol was discontinued per discharge paperwork. He was not given a reason as to why it was discontinued nor was there any documentation supporting this. Please advise if pt can take Tylenol for pain or if there is anything PCP would recommend. Please call patient back on his cell phone. Thank you.

## 2022-03-21 ENCOUNTER — OFFICE VISIT (OUTPATIENT)
Dept: FAMILY MEDICINE CLINIC | Facility: CLINIC | Age: 72
End: 2022-03-21
Payer: MEDICARE

## 2022-03-21 ENCOUNTER — TELEPHONE (OUTPATIENT)
Dept: FAMILY MEDICINE CLINIC | Facility: CLINIC | Age: 72
End: 2022-03-21

## 2022-03-21 VITALS
OXYGEN SATURATION: 98 % | TEMPERATURE: 98 F | HEIGHT: 69 IN | HEART RATE: 64 BPM | DIASTOLIC BLOOD PRESSURE: 68 MMHG | SYSTOLIC BLOOD PRESSURE: 112 MMHG | BODY MASS INDEX: 34.61 KG/M2 | RESPIRATION RATE: 18 BRPM | WEIGHT: 233.69 LBS

## 2022-03-21 DIAGNOSIS — I42.0 DCM (DILATED CARDIOMYOPATHY) (HCC): ICD-10-CM

## 2022-03-21 DIAGNOSIS — G35 MULTIPLE SCLEROSIS (HCC): ICD-10-CM

## 2022-03-21 DIAGNOSIS — I48.20 ATRIAL FIBRILLATION, CHRONIC (HCC): ICD-10-CM

## 2022-03-21 DIAGNOSIS — B35.9 RINGWORM: ICD-10-CM

## 2022-03-21 DIAGNOSIS — Z12.5 SCREENING FOR PROSTATE CANCER: ICD-10-CM

## 2022-03-21 DIAGNOSIS — Z95.1 S/P CABG (CORONARY ARTERY BYPASS GRAFT): ICD-10-CM

## 2022-03-21 DIAGNOSIS — Z79.01 ANTICOAGULATED: ICD-10-CM

## 2022-03-21 DIAGNOSIS — I11.0 HYPERTENSIVE HEART DISEASE WITH HEART FAILURE (HCC): ICD-10-CM

## 2022-03-21 DIAGNOSIS — E78.00 PURE HYPERCHOLESTEROLEMIA: ICD-10-CM

## 2022-03-21 DIAGNOSIS — S30.1XXD ABDOMINAL WALL HEMATOMA, SUBSEQUENT ENCOUNTER: Primary | ICD-10-CM

## 2022-03-21 DIAGNOSIS — E66.01 OBESITY, MORBID, BMI 40.0-49.9 (HCC): ICD-10-CM

## 2022-03-21 DIAGNOSIS — I25.10 CAD IN NATIVE ARTERY: ICD-10-CM

## 2022-03-21 DIAGNOSIS — Z95.810 S/P IMPLANTATION OF AUTOMATIC CARDIOVERTER/DEFIBRILLATOR (AICD): ICD-10-CM

## 2022-03-21 DIAGNOSIS — M25.561 ACUTE PAIN OF RIGHT KNEE: ICD-10-CM

## 2022-03-21 PROCEDURE — 99496 TRANSJ CARE MGMT HIGH F2F 7D: CPT | Performed by: FAMILY MEDICINE

## 2022-03-21 NOTE — TELEPHONE ENCOUNTER
Imer Jefferson is scheduling appt for pt, states  wanted a f/u in late April, early May. Scheduled earliest appt on 6/7. Would like to be seen earlier if nurse and Dr advise it.

## 2022-03-21 NOTE — PATIENT INSTRUCTIONS
Monitor hematoma it may take 3 to 4 weeks for this to go away. Use Voltaren topically twice a day for 7 to 10 days to your knee. If there is no improvement in your knee pain in 10 to 14 days call office to let us know. Apply clotrimazole/betamethasone ointment to the skin lesion and cover with Band-Aid please make sure that the Band-Aid is not very adhesive. Monitor symptoms keep good hydration. Schedule Medicare wellness visit for April/May 2022. Do fasting blood work prior to that visit.

## 2022-03-22 ENCOUNTER — TELEPHONE (OUTPATIENT)
Dept: FAMILY MEDICINE CLINIC | Facility: CLINIC | Age: 72
End: 2022-03-22

## 2022-03-22 NOTE — TELEPHONE ENCOUNTER
Trish from [facility] called. She evaluated pt today. He will be getting HH PT 1 x a week for the next 4 weeks. Effective today. Bryson Castrejon will be faxing over plan of care.

## 2022-03-30 ENCOUNTER — OFFICE VISIT (OUTPATIENT)
Dept: PODIATRY CLINIC | Facility: CLINIC | Age: 72
End: 2022-03-30
Payer: MEDICARE

## 2022-03-30 DIAGNOSIS — B35.1 ONYCHOMYCOSIS: Primary | ICD-10-CM

## 2022-03-30 DIAGNOSIS — L60.0 ONYCHOCRYPTOSIS: ICD-10-CM

## 2022-03-30 DIAGNOSIS — I73.89 OTHER SPECIFIED PERIPHERAL VASCULAR DISEASES (HCC): ICD-10-CM

## 2022-03-30 PROCEDURE — 11721 DEBRIDE NAIL 6 OR MORE: CPT | Performed by: PODIATRIST

## 2022-04-26 PROBLEM — I25.2 HISTORY OF MI (MYOCARDIAL INFARCTION): Status: ACTIVE | Noted: 2022-04-26

## 2022-04-26 PROBLEM — IMO0001 AORTIC SCLEROSIS: Status: ACTIVE | Noted: 2022-04-26

## 2022-04-26 PROBLEM — I51.89 DIASTOLIC DYSFUNCTION: Status: ACTIVE | Noted: 2022-04-26

## 2022-05-20 NOTE — TELEPHONE ENCOUNTER
ITB:35/29/1101   for: Hospital follow-up   Patient advised to RTC on:  Schedule Medicare wellness visit for April/May 2022. Medication Quantity Refills Start End   POTASSIUM CHLORIDE 20 MEQ Oral Tab CR 30 tablet 1 2/25/2022    Sig: Art Palacio ONE TABLET BY MOUTH DAILY       [facility] sent to schedule an appointment for medicare  Wellness .

## 2022-05-22 RX ORDER — POTASSIUM CHLORIDE 20 MEQ/1
TABLET, EXTENDED RELEASE ORAL
Qty: 30 TABLET | Refills: 1 | Status: SHIPPED | OUTPATIENT
Start: 2022-05-22

## 2022-05-23 NOTE — TELEPHONE ENCOUNTER
June 20 and June 21st are on hold. Routed to front [facility]. Please call pt and offer appt as mentioned below by . Thank you!

## 2022-06-20 ENCOUNTER — OFFICE VISIT (OUTPATIENT)
Dept: FAMILY MEDICINE CLINIC | Facility: CLINIC | Age: 72
End: 2022-06-20
Payer: MEDICARE

## 2022-06-20 VITALS
RESPIRATION RATE: 14 BRPM | SYSTOLIC BLOOD PRESSURE: 108 MMHG | HEIGHT: 69 IN | WEIGHT: 224 LBS | DIASTOLIC BLOOD PRESSURE: 62 MMHG | TEMPERATURE: 98 F | HEART RATE: 66 BPM | BODY MASS INDEX: 33.18 KG/M2

## 2022-06-20 DIAGNOSIS — G47.33 OSA ON CPAP: ICD-10-CM

## 2022-06-20 DIAGNOSIS — Z12.5 SCREENING FOR PROSTATE CANCER: ICD-10-CM

## 2022-06-20 DIAGNOSIS — I11.0 HYPERTENSIVE HEART DISEASE WITH HEART FAILURE (HCC): ICD-10-CM

## 2022-06-20 DIAGNOSIS — G35 MULTIPLE SCLEROSIS (HCC): ICD-10-CM

## 2022-06-20 DIAGNOSIS — I25.10 CAD IN NATIVE ARTERY: ICD-10-CM

## 2022-06-20 DIAGNOSIS — K22.70 BARRETT'S ESOPHAGUS WITHOUT DYSPLASIA: ICD-10-CM

## 2022-06-20 DIAGNOSIS — L89.321 PRESSURE INJURY OF LEFT BUTTOCK, STAGE 1: ICD-10-CM

## 2022-06-20 DIAGNOSIS — E78.00 PURE HYPERCHOLESTEROLEMIA: ICD-10-CM

## 2022-06-20 DIAGNOSIS — Z00.00 ENCOUNTER FOR ANNUAL HEALTH EXAMINATION: Primary | ICD-10-CM

## 2022-06-20 DIAGNOSIS — I42.0 DCM (DILATED CARDIOMYOPATHY) (HCC): ICD-10-CM

## 2022-06-20 DIAGNOSIS — Z95.1 S/P CABG (CORONARY ARTERY BYPASS GRAFT): ICD-10-CM

## 2022-06-20 DIAGNOSIS — Z79.01 ANTICOAGULATED: ICD-10-CM

## 2022-06-20 DIAGNOSIS — I48.20 ATRIAL FIBRILLATION, CHRONIC (HCC): ICD-10-CM

## 2022-06-20 DIAGNOSIS — I50.22 SYSTOLIC CHF, CHRONIC (HCC): ICD-10-CM

## 2022-06-20 DIAGNOSIS — Z99.89 OSA ON CPAP: ICD-10-CM

## 2022-06-20 DIAGNOSIS — E66.01 OBESITY, MORBID, BMI 40.0-49.9 (HCC): ICD-10-CM

## 2022-06-20 DIAGNOSIS — I10 BENIGN ESSENTIAL HTN: ICD-10-CM

## 2022-06-20 DIAGNOSIS — Z95.810 S/P IMPLANTATION OF AUTOMATIC CARDIOVERTER/DEFIBRILLATOR (AICD): ICD-10-CM

## 2022-06-20 PROBLEM — I47.20 VENTRICULAR TACHYARRHYTHMIA: Status: RESOLVED | Noted: 2017-03-01 | Resolved: 2022-06-20

## 2022-06-20 PROBLEM — I47.20 VENTRICULAR TACHYARRHYTHMIA (HCC): Status: RESOLVED | Noted: 2017-03-01 | Resolved: 2022-06-20

## 2022-06-20 PROBLEM — I47.2 VENTRICULAR TACHYARRHYTHMIA (HCC): Status: RESOLVED | Noted: 2017-03-01 | Resolved: 2022-06-20

## 2022-06-20 RX ORDER — OCRELIZUMAB 300 MG/10ML
INJECTION INTRAVENOUS
COMMUNITY
Start: 2022-03-17

## 2022-06-20 RX ORDER — CLOTRIMAZOLE AND BETAMETHASONE DIPROPIONATE 10; .64 MG/G; MG/G
CREAM TOPICAL
COMMUNITY
Start: 2022-03-17

## 2022-06-20 RX ORDER — ACETAMINOPHEN 160 MG
TABLET,DISINTEGRATING ORAL
COMMUNITY
Start: 2022-03-17

## 2022-06-20 RX ORDER — FAMOTIDINE 20 MG/1
TABLET, FILM COATED ORAL
COMMUNITY
Start: 2022-03-25

## 2022-06-20 RX ORDER — FUROSEMIDE 20 MG/1
20 TABLET ORAL DAILY
COMMUNITY
Start: 2022-04-13

## 2022-07-18 ENCOUNTER — HOSPITAL ENCOUNTER (OUTPATIENT)
Age: 72
Discharge: HOME | End: 2022-07-18
Payer: MEDICARE

## 2022-07-18 ENCOUNTER — APPOINTMENT (OUTPATIENT)
Dept: GENERAL RADIOLOGY | Age: 72
End: 2022-07-18
Attending: NURSE PRACTITIONER
Payer: MEDICARE

## 2022-07-18 VITALS
TEMPERATURE: 98 F | OXYGEN SATURATION: 97 % | RESPIRATION RATE: 20 BRPM | SYSTOLIC BLOOD PRESSURE: 140 MMHG | DIASTOLIC BLOOD PRESSURE: 70 MMHG | WEIGHT: 220 LBS | BODY MASS INDEX: 34.53 KG/M2 | HEART RATE: 60 BPM | HEIGHT: 67 IN

## 2022-07-18 DIAGNOSIS — S92.511A CLOSED DISPLACED FRACTURE OF PROXIMAL PHALANX OF LESSER TOE OF RIGHT FOOT, INITIAL ENCOUNTER: Primary | ICD-10-CM

## 2022-07-18 PROCEDURE — 73610 X-RAY EXAM OF ANKLE: CPT | Performed by: NURSE PRACTITIONER

## 2022-07-18 PROCEDURE — 73590 X-RAY EXAM OF LOWER LEG: CPT | Performed by: NURSE PRACTITIONER

## 2022-07-18 PROCEDURE — 28510 TREATMENT OF TOE FRACTURE: CPT

## 2022-07-18 PROCEDURE — 99213 OFFICE O/P EST LOW 20 MIN: CPT

## 2022-07-18 PROCEDURE — 99214 OFFICE O/P EST MOD 30 MIN: CPT

## 2022-07-18 PROCEDURE — 73630 X-RAY EXAM OF FOOT: CPT | Performed by: NURSE PRACTITIONER

## 2022-07-18 NOTE — ED INITIAL ASSESSMENT (HPI)
Pt presents today with c/o right leg giving out on him on Saturday. Pt states that this caused him to fall down 2 stairs. Pt denies hitting his head or any LOC. Pt has c/o pain to his right lower leg and ankle.

## 2022-07-29 ENCOUNTER — HOSPITAL ENCOUNTER (EMERGENCY)
Facility: HOSPITAL | Age: 72
Discharge: HOME | End: 2022-07-30
Attending: EMERGENCY MEDICINE
Payer: MEDICARE

## 2022-07-29 ENCOUNTER — APPOINTMENT (OUTPATIENT)
Dept: ULTRASOUND IMAGING | Facility: HOSPITAL | Age: 72
End: 2022-07-29
Attending: EMERGENCY MEDICINE
Payer: MEDICARE

## 2022-07-29 DIAGNOSIS — L03.115 CELLULITIS OF RIGHT LOWER EXTREMITY: Primary | ICD-10-CM

## 2022-07-29 LAB
ALBUMIN SERPL-MCNC: 3.5 G/DL (ref 3.4–5)
ALBUMIN/GLOB SERPL: 1.1 {RATIO} (ref 1–2)
ALP LIVER SERPL-CCNC: 120 U/L
ALT SERPL-CCNC: 48 U/L
ANION GAP SERPL CALC-SCNC: 7 MMOL/L (ref 0–18)
AST SERPL-CCNC: 54 U/L (ref 15–37)
BASOPHILS # BLD AUTO: 0.05 X10(3) UL (ref 0–0.2)
BASOPHILS NFR BLD AUTO: 0.6 %
BILIRUB SERPL-MCNC: 1 MG/DL (ref 0.1–2)
BUN BLD-MCNC: 14 MG/DL (ref 7–18)
CALCIUM BLD-MCNC: 9.4 MG/DL (ref 8.5–10.1)
CHLORIDE SERPL-SCNC: 109 MMOL/L (ref 98–112)
CO2 SERPL-SCNC: 24 MMOL/L (ref 21–32)
CREAT BLD-MCNC: 1.03 MG/DL
EOSINOPHIL # BLD AUTO: 0.13 X10(3) UL (ref 0–0.7)
EOSINOPHIL NFR BLD AUTO: 1.5 %
ERYTHROCYTE [DISTWIDTH] IN BLOOD BY AUTOMATED COUNT: 14.1 %
GLOBULIN PLAS-MCNC: 3.1 G/DL (ref 2.8–4.4)
GLUCOSE BLD-MCNC: 76 MG/DL (ref 70–99)
HCT VFR BLD AUTO: 44.9 %
HGB BLD-MCNC: 14.7 G/DL
IMM GRANULOCYTES # BLD AUTO: 0.05 X10(3) UL (ref 0–1)
IMM GRANULOCYTES NFR BLD: 0.6 %
LACTATE SERPL-SCNC: 1.3 MMOL/L (ref 0.4–2)
LYMPHOCYTES # BLD AUTO: 1.9 X10(3) UL (ref 1–4)
LYMPHOCYTES NFR BLD AUTO: 22.2 %
MCH RBC QN AUTO: 31.6 PG (ref 26–34)
MCHC RBC AUTO-ENTMCNC: 32.7 G/DL (ref 31–37)
MCV RBC AUTO: 96.6 FL
MONOCYTES # BLD AUTO: 1.06 X10(3) UL (ref 0.1–1)
MONOCYTES NFR BLD AUTO: 12.4 %
NEUTROPHILS # BLD AUTO: 5.37 X10 (3) UL (ref 1.5–7.7)
NEUTROPHILS # BLD AUTO: 5.37 X10(3) UL (ref 1.5–7.7)
NEUTROPHILS NFR BLD AUTO: 62.7 %
OSMOLALITY SERPL CALC.SUM OF ELEC: 289 MOSM/KG (ref 275–295)
PLATELET # BLD AUTO: 211 10(3)UL (ref 150–450)
POTASSIUM SERPL-SCNC: 3.9 MMOL/L (ref 3.5–5.1)
PROT SERPL-MCNC: 6.6 G/DL (ref 6.4–8.2)
RBC # BLD AUTO: 4.65 X10(6)UL
SODIUM SERPL-SCNC: 140 MMOL/L (ref 136–145)
WBC # BLD AUTO: 8.6 X10(3) UL (ref 4–11)

## 2022-07-29 PROCEDURE — 96374 THER/PROPH/DIAG INJ IV PUSH: CPT

## 2022-07-29 PROCEDURE — 99284 EMERGENCY DEPT VISIT MOD MDM: CPT

## 2022-07-29 PROCEDURE — 93971 EXTREMITY STUDY: CPT | Performed by: EMERGENCY MEDICINE

## 2022-07-29 PROCEDURE — 80053 COMPREHEN METABOLIC PANEL: CPT | Performed by: EMERGENCY MEDICINE

## 2022-07-29 PROCEDURE — 83605 ASSAY OF LACTIC ACID: CPT | Performed by: EMERGENCY MEDICINE

## 2022-07-29 PROCEDURE — 85025 COMPLETE CBC W/AUTO DIFF WBC: CPT | Performed by: EMERGENCY MEDICINE

## 2022-07-29 RX ORDER — CEFAZOLIN SODIUM/WATER 2 G/20 ML
2 SYRINGE (ML) INTRAVENOUS ONCE
Status: COMPLETED | OUTPATIENT
Start: 2022-07-29 | End: 2022-07-29

## 2022-07-29 NOTE — ED INITIAL ASSESSMENT (HPI)
Pt reports increase in right leg swelling and painful, hx of dvt in right foot. Pitting edema noted. Was instructed to come to ER by his doctor.

## 2022-07-30 VITALS
OXYGEN SATURATION: 96 % | WEIGHT: 220 LBS | BODY MASS INDEX: 33.34 KG/M2 | TEMPERATURE: 99 F | HEART RATE: 61 BPM | SYSTOLIC BLOOD PRESSURE: 132 MMHG | HEIGHT: 68 IN | RESPIRATION RATE: 18 BRPM | DIASTOLIC BLOOD PRESSURE: 76 MMHG

## 2022-07-30 RX ORDER — CEPHALEXIN 500 MG/1
500 CAPSULE ORAL 4 TIMES DAILY
Qty: 40 CAPSULE | Refills: 0 | Status: SHIPPED | OUTPATIENT
Start: 2022-07-30 | End: 2022-08-09

## 2022-07-30 RX ORDER — POTASSIUM CHLORIDE 20 MEQ/1
TABLET, EXTENDED RELEASE ORAL
Qty: 30 TABLET | Refills: 1 | Status: SHIPPED | OUTPATIENT
Start: 2022-07-30

## 2022-07-30 RX ORDER — TRAMADOL HYDROCHLORIDE 50 MG/1
TABLET ORAL EVERY 6 HOURS PRN
Qty: 10 TABLET | Refills: 0 | Status: SHIPPED | OUTPATIENT
Start: 2022-07-30 | End: 2022-08-04

## 2022-08-03 ENCOUNTER — OFFICE VISIT (OUTPATIENT)
Dept: PODIATRY CLINIC | Facility: CLINIC | Age: 72
End: 2022-08-03
Payer: MEDICARE

## 2022-08-03 DIAGNOSIS — B35.1 ONYCHOMYCOSIS: Primary | ICD-10-CM

## 2022-08-03 DIAGNOSIS — G35 MULTIPLE SCLEROSIS (HCC): ICD-10-CM

## 2022-08-03 DIAGNOSIS — I87.2 VENOUS INSUFFICIENCY: ICD-10-CM

## 2022-08-03 DIAGNOSIS — M20.41 HAMMER TOES OF BOTH FEET: ICD-10-CM

## 2022-08-03 DIAGNOSIS — L03.115 CELLULITIS OF RIGHT LOWER EXTREMITY: ICD-10-CM

## 2022-08-03 DIAGNOSIS — L60.0 ONYCHOCRYPTOSIS: ICD-10-CM

## 2022-08-03 DIAGNOSIS — M20.42 HAMMER TOES OF BOTH FEET: ICD-10-CM

## 2022-08-03 PROCEDURE — 99213 OFFICE O/P EST LOW 20 MIN: CPT | Performed by: STUDENT IN AN ORGANIZED HEALTH CARE EDUCATION/TRAINING PROGRAM

## 2022-08-03 NOTE — PATIENT INSTRUCTIONS
- Continue Keflex as prescribed.   -Perform daily foot checks and seek immediate medical attention if any concerns arise.  -Follow-up in 2 months for reevaluation or sooner if other concerns arise peer

## 2022-08-04 ENCOUNTER — TELEPHONE (OUTPATIENT)
Dept: FAMILY MEDICINE CLINIC | Facility: CLINIC | Age: 72
End: 2022-08-04

## 2022-08-04 NOTE — TELEPHONE ENCOUNTER
Could he come to the office for his preop evaluation on Tuesday, August 9 at 11:45 AM?  Still need to get all the preop request from his surgeon.   Thank you

## 2022-08-04 NOTE — TELEPHONE ENCOUNTER
Pt calling to schedule pre-op H&P for surgery w/Dr. Patino Led at [facility] on 8/15/2022. Pt advised to contact surgeon, and have surgeon fax over request for H&P to Dr. Hussain Carmen.

## 2022-08-08 NOTE — TELEPHONE ENCOUNTER
Pt called back regarding vm left last week. Pt states Tuesday 8/9 at 11:45 AM will not work for him. Pt states he is available any other day/time this week to make an appointment, just not Tuesday. Pt is hoping for Dr MCCLURE to see him a different day if possible? Please advise any other day/time to see pt? Pt also states [facility] should have sent over pre-op paperwork already, advised we do not currently have it. Pt agrees to contact the clinic to request paperwork be sent to our office.

## 2022-08-09 ENCOUNTER — OFFICE VISIT (OUTPATIENT)
Dept: FAMILY MEDICINE CLINIC | Facility: CLINIC | Age: 72
End: 2022-08-09
Payer: MEDICARE

## 2022-08-09 VITALS
TEMPERATURE: 99 F | WEIGHT: 223 LBS | HEIGHT: 68 IN | HEART RATE: 66 BPM | BODY MASS INDEX: 33.8 KG/M2 | DIASTOLIC BLOOD PRESSURE: 70 MMHG | SYSTOLIC BLOOD PRESSURE: 120 MMHG | RESPIRATION RATE: 16 BRPM

## 2022-08-09 DIAGNOSIS — G47.33 OSA ON CPAP: ICD-10-CM

## 2022-08-09 DIAGNOSIS — I25.10 CAD IN NATIVE ARTERY: ICD-10-CM

## 2022-08-09 DIAGNOSIS — H25.012 CORTICAL AGE-RELATED CATARACT OF LEFT EYE: ICD-10-CM

## 2022-08-09 DIAGNOSIS — I10 BENIGN ESSENTIAL HTN: ICD-10-CM

## 2022-08-09 DIAGNOSIS — I48.20 ATRIAL FIBRILLATION, CHRONIC (HCC): ICD-10-CM

## 2022-08-09 DIAGNOSIS — E78.00 PURE HYPERCHOLESTEROLEMIA: ICD-10-CM

## 2022-08-09 DIAGNOSIS — H25.011 CORTICAL AGE-RELATED CATARACT OF RIGHT EYE: ICD-10-CM

## 2022-08-09 DIAGNOSIS — I42.0 DCM (DILATED CARDIOMYOPATHY) (HCC): ICD-10-CM

## 2022-08-09 DIAGNOSIS — Z95.1 S/P CABG (CORONARY ARTERY BYPASS GRAFT): ICD-10-CM

## 2022-08-09 DIAGNOSIS — I50.22 SYSTOLIC CHF, CHRONIC (HCC): ICD-10-CM

## 2022-08-09 DIAGNOSIS — E66.01 OBESITY, MORBID, BMI 40.0-49.9 (HCC): ICD-10-CM

## 2022-08-09 DIAGNOSIS — Z01.818 PRE-OP EXAMINATION: Primary | ICD-10-CM

## 2022-08-09 DIAGNOSIS — Z95.810 S/P IMPLANTATION OF AUTOMATIC CARDIOVERTER/DEFIBRILLATOR (AICD): ICD-10-CM

## 2022-08-09 DIAGNOSIS — Z99.89 OSA ON CPAP: ICD-10-CM

## 2022-08-09 DIAGNOSIS — G35 MULTIPLE SCLEROSIS (HCC): ICD-10-CM

## 2022-08-09 PROCEDURE — 99214 OFFICE O/P EST MOD 30 MIN: CPT | Performed by: FAMILY MEDICINE

## 2022-08-09 PROCEDURE — 93000 ELECTROCARDIOGRAM COMPLETE: CPT | Performed by: FAMILY MEDICINE

## 2022-08-09 RX ORDER — KETOROLAC TROMETHAMINE 4 MG/ML
1 SOLUTION/ DROPS OPHTHALMIC 4 TIMES DAILY
COMMUNITY
Start: 2022-07-27

## 2022-08-09 RX ORDER — KETOROLAC TROMETHAMINE 4 MG/ML
SOLUTION/ DROPS OPHTHALMIC
COMMUNITY
Start: 2022-07-27 | End: 2022-08-09

## 2022-08-09 RX ORDER — PREDNISOLONE ACETATE 10 MG/ML
1 SUSPENSION/ DROPS OPHTHALMIC 4 TIMES DAILY
COMMUNITY
Start: 2022-07-27

## 2022-08-09 RX ORDER — MOXIFLOXACIN 5 MG/ML
SOLUTION/ DROPS OPHTHALMIC
COMMUNITY
Start: 2022-07-27

## 2022-08-09 RX ORDER — MOXIFLOXACIN 5 MG/ML
1 SOLUTION/ DROPS OPHTHALMIC 4 TIMES DAILY
COMMUNITY
Start: 2022-07-27

## 2022-08-09 NOTE — PATIENT INSTRUCTIONS
Continue current meds. Contact Dr. Yana Wiseman to get okay before surgery and stopping Eliquis. Keep good hydration. DO NOT take Aspirin, Advil, Ibuprofen, Aleve , Motrin for 1 week prior surgery. Take Tylenol  as needed for pain.

## 2022-08-10 ENCOUNTER — TELEPHONE (OUTPATIENT)
Dept: FAMILY MEDICINE CLINIC | Facility: CLINIC | Age: 72
End: 2022-08-10

## 2022-08-10 NOTE — TELEPHONE ENCOUNTER
Call from lulú/surg /[facility]-sts pt has upcoming cataract surgery, saw dr Anisha Horowitz yesterday for preop exam.   sts they received fax w partial info from visit but no ROS or info re clearance for surgery. Requests this info be faxed asap to Attn: Surgery Scheduling, fax #[phone]    Record reviewed. Advised will forward message to dr Anisha Horowitz. Cherelle Goddard agrees w plan. **see above-thanks!

## 2022-08-24 ENCOUNTER — TELEPHONE (OUTPATIENT)
Dept: FAMILY MEDICINE CLINIC | Facility: CLINIC | Age: 72
End: 2022-08-24

## 2022-08-24 NOTE — TELEPHONE ENCOUNTER
Correction  Since patient has MS I would like him to consult his neurologist and update them with the condition. If the symptoms would recur he needs to go to ER for evaluation.   Thank you

## 2022-08-24 NOTE — TELEPHONE ENCOUNTER
Pt's wife informed of below and she voiced understanding. Agreed to call his Neurologist for update and will take pt to ER if symptoms recur.

## 2022-08-24 NOTE — TELEPHONE ENCOUNTER
S/W pt's wife and she states Monday, pt woke up and for a few minutes, he started speaking gibberish, then asked what day it was. Only last for a few minutes. Per pt's wife, she denies any drooping. No incidence occur after that day. Was concerned and just wanted to make sure. Advised pt to continue to monitor at this time and will forward message to Dr. Luis Barrios for further eval.    Voiced understanding.

## 2022-08-24 NOTE — TELEPHONE ENCOUNTER
PT's wife calling wanting to speak with a nurse on Monday she said the patient fell asleep in his chair and woke up speaking\" gibberish and not knowing what day it was\". It took about an hour for him to get back to \"normal\". He \"seems fine now/since\". She is wondering if maybe he had a \"TIA\". States she \"doesn't want to ignore that it happened\". She did make a comment that the patient has MS. Please advise pt's wife Kati Morton.

## 2022-08-24 NOTE — TELEPHONE ENCOUNTER
Since patient has a mass I would like him to consult his neurologist and update them with the condition. If the symptoms would recur he needs to go to ER for evaluation.   Thank you

## 2022-09-15 ENCOUNTER — PATIENT OUTREACH (OUTPATIENT)
Dept: CASE MANAGEMENT | Age: 72
End: 2022-09-15

## 2022-09-15 NOTE — PROGRESS NOTES
Called patient regarding ccm intro call and left a message for patient to call back when they can. Reviewed patient chart. Left contact my contact number [phone].        Time Spent This Encounter Total: 3  min medical record review  Monthly Minute Total including today: 3 minutes

## 2022-10-10 RX ORDER — POTASSIUM CHLORIDE 20 MEQ/1
TABLET, EXTENDED RELEASE ORAL
Qty: 30 TABLET | Refills: 1 | Status: SHIPPED | OUTPATIENT
Start: 2022-10-10

## 2022-11-04 ENCOUNTER — OFFICE VISIT (OUTPATIENT)
Dept: PODIATRY CLINIC | Facility: CLINIC | Age: 72
End: 2022-11-04
Payer: MEDICARE

## 2022-11-04 DIAGNOSIS — B35.1 ONYCHOMYCOSIS: Primary | ICD-10-CM

## 2022-11-04 DIAGNOSIS — G35 MULTIPLE SCLEROSIS (HCC): ICD-10-CM

## 2022-11-04 DIAGNOSIS — L60.0 ONYCHOCRYPTOSIS: ICD-10-CM

## 2022-11-04 DIAGNOSIS — M20.41 HAMMER TOES OF BOTH FEET: ICD-10-CM

## 2022-11-04 DIAGNOSIS — M20.42 HAMMER TOES OF BOTH FEET: ICD-10-CM

## 2022-11-04 DIAGNOSIS — I87.2 VENOUS INSUFFICIENCY: ICD-10-CM

## 2022-11-04 PROCEDURE — 99213 OFFICE O/P EST LOW 20 MIN: CPT | Performed by: STUDENT IN AN ORGANIZED HEALTH CARE EDUCATION/TRAINING PROGRAM

## 2022-11-05 NOTE — PATIENT INSTRUCTIONS
-Discussed importance of proper pedal hygiene, regular foot checks.  -Patient to avoid walking barefoot. Ambulate with supportive shoes .  -Use compression stockings to lower extremities and follow up with PCP regarding lower extremity swelling.  -Patient to monitor for acute signs of infection and seek immediate medical attention if any signs of infection or other concerns arise.

## 2022-12-09 ENCOUNTER — PATIENT OUTREACH (OUTPATIENT)
Dept: CASE MANAGEMENT | Age: 72
End: 2022-12-09

## 2022-12-09 NOTE — PROGRESS NOTES
Called patient regarding CCM intro and left a message for patient to call back when they can. Reviewed patient chart. Sherice Hayes [address] [place] [address], [facility] [address]  Phone: [phone]  [employer] website

## 2023-01-11 ENCOUNTER — OFFICE VISIT (OUTPATIENT)
Dept: PODIATRY CLINIC | Facility: CLINIC | Age: 73
End: 2023-01-11

## 2023-01-11 DIAGNOSIS — M20.42 HAMMER TOES OF BOTH FEET: ICD-10-CM

## 2023-01-11 DIAGNOSIS — G35 MULTIPLE SCLEROSIS (HCC): ICD-10-CM

## 2023-01-11 DIAGNOSIS — M20.41 HAMMER TOES OF BOTH FEET: ICD-10-CM

## 2023-01-11 DIAGNOSIS — L60.0 ONYCHOCRYPTOSIS: ICD-10-CM

## 2023-01-11 DIAGNOSIS — I87.2 VENOUS INSUFFICIENCY: ICD-10-CM

## 2023-01-11 DIAGNOSIS — B35.1 ONYCHOMYCOSIS: Primary | ICD-10-CM

## 2023-01-11 PROCEDURE — 99213 OFFICE O/P EST LOW 20 MIN: CPT | Performed by: STUDENT IN AN ORGANIZED HEALTH CARE EDUCATION/TRAINING PROGRAM

## 2023-01-12 NOTE — PATIENT INSTRUCTIONS
-Discussed importance of proper pedal hygiene, regular foot checks.  -Patient to avoid walking barefoot. Ambulate with supportive shoes.  -Patient to monitor for acute signs of infection and seek immediate medical attention if any signs of infection or other concerns arise.

## 2023-01-18 RX ORDER — POTASSIUM CHLORIDE 20 MEQ/1
TABLET, EXTENDED RELEASE ORAL
Qty: 30 TABLET | Refills: 0 | Status: SHIPPED | OUTPATIENT
Start: 2023-01-18

## 2023-02-15 RX ORDER — POTASSIUM CHLORIDE 20 MEQ/1
TABLET, EXTENDED RELEASE ORAL
Qty: 30 TABLET | Refills: 0 | Status: SHIPPED | OUTPATIENT
Start: 2023-02-15

## 2023-02-15 NOTE — TELEPHONE ENCOUNTER
Pt's LOV was 8/9/22 for presurgical clearance  Prior LOV was 6/20/22 MAW advised RTC in 6 months (12/20/22)    Pt overdue for apt by 2 months, please advise next available apt date okay or if pt should be seen prior?     Next Available Apt: 5/16/23

## 2023-03-10 ENCOUNTER — PATIENT OUTREACH (OUTPATIENT)
Dept: CASE MANAGEMENT | Age: 73
End: 2023-03-10

## 2023-03-10 NOTE — PROGRESS NOTES
Attempted CCM intro- left message for patient     Luke Kessler Manager/Health   [address] [employer] Management Dept. Office [phone]   Jeffrey Dumont. [facility]

## 2023-03-15 DIAGNOSIS — E87.6 HYPOKALEMIA: Primary | ICD-10-CM

## 2023-03-15 RX ORDER — POTASSIUM CHLORIDE 20 MEQ/1
TABLET, EXTENDED RELEASE ORAL
Qty: 90 TABLET | Refills: 0 | Status: SHIPPED | OUTPATIENT
Start: 2023-03-15

## 2023-03-17 NOTE — PROGRESS NOTES
Pt is drastically confused at night. Pt reports not knowing where he is and wanting to \"get upstairs\" to his home bedroom. Pt reoriented. Bed alarm on. Fall precautions in place. Door left open. Frequent rounds. VSS, remains afebrile.  Mediations given The patient is a 81y Female complaining of fall.

## 2023-03-28 NOTE — H&P
MERT HOSPITALIST  History and Physical     Margaret Graver Patient Status:  Emergency    10/16/1950 MRN [id number]   Location [address] [facility] Attending Awilda Pascual Day # 0 PCP Robert Cardoza MD     Chief Co Detail Level: Detailed • LUMBAR EPIDURAL N/A 8/14/2013    Performed by Cherylene Deters, MD at [address] [facility] N/A 1/25/2014    Performed by Hay Lara DDS at [address] [facility] OR   • OPEN HEART SURGERY (PBP)      1998 4CABG X 4   • OTHER SURGICAL General Sunscreen Counseling: I recommended a broad spectrum sunscreen with a SPF of 30 or higher.  I explained that SPF 30 sunscreens block approximately 97 percent of the sun's harmful rays.  Sunscreens should be applied at least 15 minutes prior to expected sun exposure and then every 2 hours after that as long as sun exposure continues. If swimming or exercising sunscreen should be reapplied every 45 minutes to an hour after getting wet or sweating.  One ounce, or the equivalent of a shot glass full of sunscreen, is adequate to protect the skin not covered by a bathing suit. I also recommended a lip balm with a sunscreen as well. Sun protective clothing can be used in lieu of sunscreen but must be worn the entire time you are exposed to the sun's rays. Take 1 tablet (5 mg total) by mouth 2 (two) times daily. , Disp: 60 tablet, Rfl: 2    •  Omega-3 Fatty Acids (OMEGA-3 OR), Take by mouth., Disp: , Rfl:     •  Ocrelizumab (OCREVUS) 300 MG/10ML Intravenous Solution, Inject 20 mL (600 mg total) into the vein Labs:  Recent Labs   Lab 03/04/21 0249   WBC 11.6*   HGB 15.6   MCV 94.9   .0   INR 1.36*       Recent Labs   Lab 03/04/21 0249   GLU 92   BUN 19*   CREATSERUM 1.27   GFRAA 65   GFRNAA 56*   CA 9.2   ALB 3.7      K 3.4*      CO2 24

## 2023-04-20 ENCOUNTER — PATIENT OUTREACH (OUTPATIENT)
Dept: CASE MANAGEMENT | Age: 73
End: 2023-04-20

## 2023-04-24 ENCOUNTER — PATIENT OUTREACH (OUTPATIENT)
Dept: CASE MANAGEMENT | Age: 73
End: 2023-04-24

## 2023-04-24 DIAGNOSIS — M54.9 CHRONIC BILATERAL BACK PAIN, UNSPECIFIED BACK LOCATION: ICD-10-CM

## 2023-04-24 DIAGNOSIS — Z79.01 LONG TERM (CURRENT) USE OF ANTICOAGULANTS: ICD-10-CM

## 2023-04-24 DIAGNOSIS — R53.1 WEAKNESS GENERALIZED: ICD-10-CM

## 2023-04-24 DIAGNOSIS — M48.00 SPINAL STENOSIS, UNSPECIFIED SPINAL REGION: ICD-10-CM

## 2023-04-24 DIAGNOSIS — M62.838 SPASM OF MUSCLE: ICD-10-CM

## 2023-04-24 DIAGNOSIS — I10 BENIGN ESSENTIAL HTN: ICD-10-CM

## 2023-04-24 DIAGNOSIS — I25.2 HISTORY OF MI (MYOCARDIAL INFARCTION): ICD-10-CM

## 2023-04-24 DIAGNOSIS — M54.9 INTRACTABLE BACK PAIN: ICD-10-CM

## 2023-04-24 DIAGNOSIS — I50.22 SYSTOLIC CHF, CHRONIC (HCC): ICD-10-CM

## 2023-04-24 DIAGNOSIS — I25.10 CAD IN NATIVE ARTERY: ICD-10-CM

## 2023-04-24 DIAGNOSIS — K80.20 GALLBLADDER STONE WITHOUT CHOLECYSTITIS OR OBSTRUCTION: ICD-10-CM

## 2023-04-24 DIAGNOSIS — S90.31XA HEMATOMA OF RIGHT FOOT: ICD-10-CM

## 2023-04-24 DIAGNOSIS — G35 MULTIPLE SCLEROSIS (HCC): ICD-10-CM

## 2023-04-24 DIAGNOSIS — R00.2 PALPITATION: ICD-10-CM

## 2023-04-24 DIAGNOSIS — K21.00 GASTROESOPHAGEAL REFLUX DISEASE WITH ESOPHAGITIS WITHOUT HEMORRHAGE: ICD-10-CM

## 2023-04-24 DIAGNOSIS — G47.33 OSA ON CPAP: ICD-10-CM

## 2023-04-24 DIAGNOSIS — E66.01 OBESITY, MORBID, BMI 40.0-49.9 (HCC): ICD-10-CM

## 2023-04-24 DIAGNOSIS — Z99.89 OSA ON CPAP: ICD-10-CM

## 2023-04-24 DIAGNOSIS — E87.6 HYPOKALEMIA: ICD-10-CM

## 2023-04-24 DIAGNOSIS — G89.29 CHRONIC BILATERAL BACK PAIN, UNSPECIFIED BACK LOCATION: ICD-10-CM

## 2023-04-24 DIAGNOSIS — Z95.1 S/P CABG (CORONARY ARTERY BYPASS GRAFT): ICD-10-CM

## 2023-04-24 DIAGNOSIS — Z95.810 S/P IMPLANTATION OF AUTOMATIC CARDIOVERTER/DEFIBRILLATOR (AICD): ICD-10-CM

## 2023-04-24 DIAGNOSIS — E78.00 PURE HYPERCHOLESTEROLEMIA: ICD-10-CM

## 2023-04-24 DIAGNOSIS — L03.115 CELLULITIS OF RIGHT LOWER EXTREMITY: ICD-10-CM

## 2023-04-24 DIAGNOSIS — M16.10 HIP ARTHRITIS: ICD-10-CM

## 2023-06-12 ENCOUNTER — PATIENT OUTREACH (OUTPATIENT)
Dept: CASE MANAGEMENT | Age: 73
End: 2023-06-12

## 2023-06-12 DIAGNOSIS — Z99.89 OSA ON CPAP: ICD-10-CM

## 2023-06-12 DIAGNOSIS — M48.00 SPINAL STENOSIS, UNSPECIFIED SPINAL REGION: ICD-10-CM

## 2023-06-12 DIAGNOSIS — G35 MULTIPLE SCLEROSIS (HCC): ICD-10-CM

## 2023-06-12 DIAGNOSIS — S90.31XA HEMATOMA OF RIGHT FOOT: ICD-10-CM

## 2023-06-12 DIAGNOSIS — I25.2 HISTORY OF MI (MYOCARDIAL INFARCTION): ICD-10-CM

## 2023-06-12 DIAGNOSIS — M16.10 HIP ARTHRITIS: ICD-10-CM

## 2023-06-12 DIAGNOSIS — I51.89 DIASTOLIC DYSFUNCTION: ICD-10-CM

## 2023-06-12 DIAGNOSIS — G89.29 CHRONIC BILATERAL BACK PAIN, UNSPECIFIED BACK LOCATION: ICD-10-CM

## 2023-06-12 DIAGNOSIS — I10 BENIGN ESSENTIAL HTN: ICD-10-CM

## 2023-06-12 DIAGNOSIS — Z91.81 AT RISK FOR FALLING: ICD-10-CM

## 2023-06-12 DIAGNOSIS — E66.01 OBESITY, MORBID, BMI 40.0-49.9 (HCC): ICD-10-CM

## 2023-06-12 DIAGNOSIS — M54.9 CHRONIC BILATERAL BACK PAIN, UNSPECIFIED BACK LOCATION: ICD-10-CM

## 2023-06-12 DIAGNOSIS — Z79.01 LONG TERM (CURRENT) USE OF ANTICOAGULANTS: ICD-10-CM

## 2023-06-12 DIAGNOSIS — Z95.810 CARDIAC DEFIBRILLATOR IN PLACE: ICD-10-CM

## 2023-06-12 DIAGNOSIS — I65.23 ATHEROSCLEROSIS OF BOTH CAROTID ARTERIES: ICD-10-CM

## 2023-06-12 DIAGNOSIS — G47.33 OSA ON CPAP: ICD-10-CM

## 2023-06-12 DIAGNOSIS — K80.20 GALLBLADDER STONE WITHOUT CHOLECYSTITIS OR OBSTRUCTION: ICD-10-CM

## 2023-06-12 DIAGNOSIS — E78.00 PURE HYPERCHOLESTEROLEMIA: ICD-10-CM

## 2023-06-12 RX ORDER — ASPIRIN 81 MG/1
81 TABLET ORAL DAILY
COMMUNITY

## 2023-06-21 ENCOUNTER — OFFICE VISIT (OUTPATIENT)
Dept: FAMILY MEDICINE CLINIC | Facility: CLINIC | Age: 73
End: 2023-06-21
Payer: MEDICARE

## 2023-06-21 VITALS
SYSTOLIC BLOOD PRESSURE: 104 MMHG | RESPIRATION RATE: 16 BRPM | BODY MASS INDEX: 34.41 KG/M2 | HEIGHT: 68 IN | TEMPERATURE: 98 F | DIASTOLIC BLOOD PRESSURE: 58 MMHG | HEART RATE: 84 BPM | WEIGHT: 227.06 LBS

## 2023-06-21 DIAGNOSIS — G47.33 OSA ON CPAP: ICD-10-CM

## 2023-06-21 DIAGNOSIS — I50.22 SYSTOLIC CHF, CHRONIC (HCC): ICD-10-CM

## 2023-06-21 DIAGNOSIS — Z95.810 S/P IMPLANTATION OF AUTOMATIC CARDIOVERTER/DEFIBRILLATOR (AICD): ICD-10-CM

## 2023-06-21 DIAGNOSIS — I25.10 CAD IN NATIVE ARTERY: ICD-10-CM

## 2023-06-21 DIAGNOSIS — Z99.89 OSA ON CPAP: ICD-10-CM

## 2023-06-21 DIAGNOSIS — B35.9 RINGWORM: ICD-10-CM

## 2023-06-21 DIAGNOSIS — I11.0 HYPERTENSIVE HEART DISEASE WITH HEART FAILURE (HCC): ICD-10-CM

## 2023-06-21 DIAGNOSIS — G35 MULTIPLE SCLEROSIS (HCC): ICD-10-CM

## 2023-06-21 DIAGNOSIS — I42.0 DCM (DILATED CARDIOMYOPATHY) (HCC): ICD-10-CM

## 2023-06-21 DIAGNOSIS — K22.70 BARRETT'S ESOPHAGUS WITHOUT DYSPLASIA: ICD-10-CM

## 2023-06-21 DIAGNOSIS — R09.81 NASAL CONGESTION: ICD-10-CM

## 2023-06-21 DIAGNOSIS — Z95.1 S/P CABG (CORONARY ARTERY BYPASS GRAFT): ICD-10-CM

## 2023-06-21 DIAGNOSIS — E66.01 OBESITY, MORBID, BMI 40.0-49.9 (HCC): ICD-10-CM

## 2023-06-21 DIAGNOSIS — E78.00 PURE HYPERCHOLESTEROLEMIA: ICD-10-CM

## 2023-06-21 DIAGNOSIS — Z00.00 ENCOUNTER FOR ANNUAL HEALTH EXAMINATION: Primary | ICD-10-CM

## 2023-06-21 DIAGNOSIS — I10 BENIGN ESSENTIAL HTN: ICD-10-CM

## 2023-06-21 DIAGNOSIS — Z12.5 SCREENING FOR PROSTATE CANCER: ICD-10-CM

## 2023-06-21 DIAGNOSIS — I48.20 ATRIAL FIBRILLATION, CHRONIC (HCC): ICD-10-CM

## 2023-06-21 PROCEDURE — 99214 OFFICE O/P EST MOD 30 MIN: CPT | Performed by: FAMILY MEDICINE

## 2023-06-21 PROCEDURE — G0444 DEPRESSION SCREEN ANNUAL: HCPCS | Performed by: FAMILY MEDICINE

## 2023-06-21 PROCEDURE — 1125F AMNT PAIN NOTED PAIN PRSNT: CPT | Performed by: FAMILY MEDICINE

## 2023-06-21 PROCEDURE — G0439 PPPS, SUBSEQ VISIT: HCPCS | Performed by: FAMILY MEDICINE

## 2023-06-21 RX ORDER — NYSTATIN 100000 U/G
CREAM TOPICAL
Qty: 30 G | Refills: 1 | Status: SHIPPED | OUTPATIENT
Start: 2023-06-21

## 2023-06-21 RX ORDER — IPRATROPIUM BROMIDE 42 UG/1
SPRAY, METERED NASAL
Qty: 15 ML | Refills: 1 | Status: SHIPPED | OUTPATIENT
Start: 2023-06-21

## 2023-06-21 NOTE — PATIENT INSTRUCTIONS
Atrovent nasal spray 1 spray each nostril twice a day for congestion. Use IcyHot and warm compresses to your neck. SHINGRIX-SHINGLES SHOT. Continue current meds. Watch diet for fats and carbs. Stay active. [address] Willian Winston's SCREENING SCHEDULE   Tests on this list are recommended by your physician but may not be covered, or covered at this frequency, by your insurer. Please check with your insurance carrier before scheduling to verify coverage.    PREVENTATIVE SERVICES FREQUENCY &  COVERAGE DETAILS LAST COMPLETION DATE   Diabetes Screening    Fasting Blood Sugar / Glucose    One screening every 12 months if never tested or if previously tested but not diagnosed with pre-diabetes   One screening every 6 months if diagnosed with pre-diabetes Lab Results   Component Value Date    GLU 76 07/29/2022        Cardiovascular Disease Screening    Lipid Panel  Cholesterol  Lipoprotein (HDL)  Triglycerides Covered every 5 years for all Medicare beneficiaries without apparent signs or symptoms of cardiovascular disease Lab Results   Component Value Date    CHOLEST 91 03/15/2022    HDL 44 03/15/2022    LDL 32 03/15/2022    TRIG 67 03/15/2022         Electrocardiogram (EKG)   Covered if needed at Welcome to Medicare, and non-screening if indicated for medical reasons 08/09/2022      Ultrasound Screening for Abdominal Aortic Aneurysm (AAA) Covered once in a lifetime for one of the following risk factors    Men who are 73-68 years old and have ever smoked    Anyone with a family history -     Colorectal Cancer Screening  Covered for ages 52-80; only need ONE of the following:    Colonoscopy   Covered every 10 years    Covered every 2 years if patient is at high risk or previous colonoscopy was abnormal 11/20/2015    Colorectal Cancer Screening due on 11/20/2025    Flexible Sigmoidoscopy   Covered every 4 years -    Fecal Occult Blood Test Covered annually -   Prostate Cancer Screening Prostate-Specific Antigen (PSA) Annually Lab Results   Component Value Date    PSA 2.360 11/03/2017     PSA due on 10/09/2021   Immunizations    Influenza Covered once per flu season  Please get every year -  No recommendations at this time    Pneumococcal Each vaccine (Uykbakj21 & Ahsaojfkw22) covered once after 65 Prevnar 13: 05/20/2015    Tuhljwtbb68: 11/03/2017     No recommendations at this time    Hepatitis B One screening covered for patients with certain risk factors   -  No recommendations at this time    Tetanus Toxoid Not covered by Medicare Part B unless medically necessary (cut with metal); may be covered with your pharmacy prescription benefits -    Tetanus, Diptheria and Pertusis TD and TDaP Not covered by Medicare Part B -  No recommendations at this time    Zoster Not covered by Medicare Part B; may be covered with your pharmacy  prescription benefits -  Zoster Vaccines(1 of 2) Never done       Annual Monitoring of Persistent Medications (ACE/ARB, digoxin diuretics, anticonvulsants)    Potassium Annually Lab Results   Component Value Date    K 3.9 07/29/2022         Creatinine   Annually Lab Results   Component Value Date    CREATSERUM 1.03 07/29/2022         BUN Annually Lab Results   Component Value Date    BUN 14 07/29/2022       Drug Serum Conc Annually No results found for: DIGOXIN, DIG, VALP

## 2023-06-22 ENCOUNTER — HOSPITAL ENCOUNTER (OUTPATIENT)
Dept: GENERAL RADIOLOGY | Facility: HOSPITAL | Age: 73
Discharge: HOME | End: 2023-06-22
Attending: INTERNAL MEDICINE
Payer: MEDICARE

## 2023-06-22 DIAGNOSIS — Z79.899 ON AMIODARONE THERAPY: ICD-10-CM

## 2023-06-22 DIAGNOSIS — I48.0 PAF (PAROXYSMAL ATRIAL FIBRILLATION) (HCC): ICD-10-CM

## 2023-06-22 DIAGNOSIS — I42.0 DILATED CARDIOMYOPATHY (HCC): ICD-10-CM

## 2023-06-22 DIAGNOSIS — R63.8 INCREASED BMI: ICD-10-CM

## 2023-06-22 DIAGNOSIS — I47.20 VT (VENTRICULAR TACHYCARDIA) (HCC): ICD-10-CM

## 2023-06-22 PROCEDURE — 71046 X-RAY EXAM CHEST 2 VIEWS: CPT | Performed by: INTERNAL MEDICINE

## 2023-07-21 DIAGNOSIS — E87.6 HYPOKALEMIA: ICD-10-CM

## 2023-07-21 NOTE — TELEPHONE ENCOUNTER
LOV: 06/21/2023  Next OV:   Return in about 6 months (around 12/21/2023). Last Refill:  Medication Quantity Refills Start End   POTASSIUM CHLORIDE 20 MEQ Oral Tab CR 90 tablet 0 3/15/2023    Sig:   TAKE ONE TABLET BY MOUTH DAILY     Route:   (none)           Please authorize if acceptable. Thank you!

## 2023-07-22 RX ORDER — POTASSIUM CHLORIDE 20 MEQ/1
20 TABLET, EXTENDED RELEASE ORAL DAILY
Qty: 90 TABLET | Refills: 0 | Status: SHIPPED | OUTPATIENT
Start: 2023-07-22

## 2023-08-18 ENCOUNTER — PATIENT OUTREACH (OUTPATIENT)
Dept: CASE MANAGEMENT | Age: 73
End: 2023-08-18

## 2023-08-18 NOTE — PROGRESS NOTES
Attempted CCM monthly outreach, not able to leave a VM , my chart message sent - Will try back at a later time. Medical record reviewed including recent office visits and test results.     Chart review - 3 min     Total time - 5 min

## 2023-10-11 ENCOUNTER — PATIENT OUTREACH (OUTPATIENT)
Dept: CASE MANAGEMENT | Age: 73
End: 2023-10-11

## 2023-10-11 NOTE — PROGRESS NOTES
Attempted CCM monthly outreach,    Per care everywhere patient confirmed he was moving . CCM will send my chart to confirm . Unable to leave a message- rings, no VM .   CCM can be reached at  [phone]. Will try back at a later time. Medical record reviewed including recent office visits and test results.     Total monthly time - 5 minutes

## 2023-10-24 DIAGNOSIS — E87.6 HYPOKALEMIA: ICD-10-CM

## 2023-10-25 RX ORDER — POTASSIUM CHLORIDE 20 MEQ/1
20 TABLET, EXTENDED RELEASE ORAL DAILY
Qty: 90 TABLET | Refills: 0 | Status: SHIPPED | OUTPATIENT
Start: 2023-10-25

## 2023-10-25 NOTE — TELEPHONE ENCOUNTER
LOV: 6/21/23  Future Visit: none  Last Rx: 7/22/23 90 tabs 0 refills  Last Labs: 6/23/23  Per protocol to provider

## 2023-12-13 ENCOUNTER — PATIENT OUTREACH (OUTPATIENT)
Dept: CASE MANAGEMENT | Age: 73
End: 2023-12-13

## 2023-12-13 NOTE — PROGRESS NOTES
Attempted to contact pt for monthly outreach no answer unable to leave a VM . Outreach to patient to complete monthly follow up on : Calling to verify if patient did move- symptoms/ goals. Reviewed pt's chart including recent visits.     -Care everywhere updated. -Immunization reconciliation completed. No updates available. Pt is due for:     Health Maintenance   Topic Date Due    Zoster Vaccines (1 of 2) Never done    PSA  10/09/2021    COVID-19 Vaccine (4 - 2023-24 season) 09/01/2023    Influenza Vaccine (1) Never done    Annual Physical  06/21/2024    Colorectal Cancer Screening  11/20/2025    Annual Depression Screening  Completed    Fall Risk Screening (Annual)  Completed    Pneumococcal Vaccine: 65+ Years  Completed         No future appointments.     Total time - 3 min  Total Monthly time- 3 min

## 2023-12-21 DIAGNOSIS — E87.6 HYPOKALEMIA: ICD-10-CM

## 2023-12-21 RX ORDER — POTASSIUM CHLORIDE 20 MEQ/1
20 TABLET, EXTENDED RELEASE ORAL DAILY
Qty: 90 TABLET | Refills: 0 | OUTPATIENT
Start: 2023-12-21

## 2023-12-21 NOTE — TELEPHONE ENCOUNTER
Requested Prescriptions     Pending Prescriptions Disp Refills    POTASSIUM CHLORIDE 20 MEQ Oral Tab CR [Pharmacy Med Name: POTASSIUM CL ER 20 MEQ TABLET] 90 tablet 0     Sig: TAKE 1 TABLET BY MOUTH EVERY DAY     Last refill 10/25/23 #90  Refill requested too soon - denied.

## 2024-01-05 ENCOUNTER — PATIENT OUTREACH (OUTPATIENT)
Dept: CASE MANAGEMENT | Age: 74
End: 2024-01-05

## 2024-01-05 NOTE — PROGRESS NOTES
Per Care everywhere - patient now following up with [place] provider- and previously informed CCM And PCP that he was moving .     Removing patient from CCM program - removed myself from care team

## 2024-06-28 ENCOUNTER — PATIENT OUTREACH (OUTPATIENT)
Dept: FAMILY MEDICINE CLINIC | Facility: CLINIC | Age: 74
End: 2024-06-28

## 2024-07-11 ENCOUNTER — TELEPHONE (OUTPATIENT)
Dept: FAMILY MEDICINE CLINIC | Facility: CLINIC | Age: 74
End: 2024-07-11

## 2024-07-23 NOTE — CM/SW NOTE
Care Due:                  Date            Visit Type   Department     Provider  --------------------------------------------------------------------------------                                             [facility] INTERNAL  Last Visit: 06-      PRE-OP       MEDICINE       Eddie Dleaney                              EP -                              PRIMARY      [facility] INTERNAL  Next Visit: 11-      CARE ([facility])   MEDICINE       Eddie Delaney                                                            Last  Test          Frequency    Reason                     Performed    Due Date  --------------------------------------------------------------------------------    Uric Acid...  12 months..  allopurinoL..............  Not Found    Overdue    [employer] Embedded Care Due Messages. Reference number: [id number].   7/23/2024 2:44:40 PM CDT   SW spoke to [facility]. PT eval indicating [place]. PMR consult recommending PT vs SNF. Pt is considering and SW will discuss with pt on 8/8/21 after he has time to process. Social work to remain available for support or any discharge planning needs.     Khadra Cuenca

## 2024-10-04 ENCOUNTER — TELEPHONE (OUTPATIENT)
Dept: FAMILY MEDICINE CLINIC | Facility: CLINIC | Age: 74
End: 2024-10-04

## 2024-11-01 ENCOUNTER — PATIENT OUTREACH (OUTPATIENT)
Dept: FAMILY MEDICINE CLINIC | Facility: CLINIC | Age: 74
End: 2024-11-01

## 2024-11-19 ENCOUNTER — PATIENT OUTREACH (OUTPATIENT)
Dept: FAMILY MEDICINE CLINIC | Facility: CLINIC | Age: 74
End: 2024-11-19

## 2025-01-14 NOTE — PROCEDURES
The office order for PCP removal request is Approved and finalized on January 14, 2025.    Removed Nubia Pérez MD as the patient's Primary Care Physician

## 2025-03-28 NOTE — IP AVS SNAPSHOT
Patient Demographics     Address  [address] [place] [address] Phone  [phone] [facility]  [phone] [facility] E-mail Address  [email or web address]      Emergency Contact(s)     Name Relation Home Work Mobile    Miguel AngelsriniSavanna Spouse 689- furosemide 20 MG Tabs  Commonly known as: LASIX      Take 1 tablet (20 mg total) by mouth daily. Robbi Mohan NP         Losartan Potassium 25 MG Tabs  Commonly known as: COZAAR      Take 12.5 mg by mouth daily.           metoprolol tartrate 25 MG Tab 09/20/2021 1248   Resp 18 Filed at 09/20/2021 1248   Temp 98.2 °F (36.8 °C) Filed at 09/20/2021 1248   SpO2 100 % Filed at 09/20/2021 1248      Patient's Most Recent Weight       Most Recent Value   Patient Weight 106.1 kg (233 lb 12.8 oz)      CPAP Settin Final result                 Please view results for these tests on the individual orders.     Specimen Information    Type Source Collected On   Blood — 09/20/21 5841            Testing Performed By     Lab - Abbreviation Name Director Address Valid Date Status:  Inpatient    10/16/1950 MRN [id number]   [facility] 4NW-A Attending Joanna Quach MD   Hosp Day # 0 PCP Kenneth Lee MD     Chief Complaint: foot swelling    History of Present Illness: Reji Matthews is a 65year old male w • Pure hypercholesterolemia 6/29/2012   • Spinal stenosis    • Unspecified sleep apnea     wears CPAP   • Unspecified urinary incontinence    • Ventricular arrhythmia    • Visual impairment         Past Surgical History:   Past Surgical History:   Proced EVENTS  7/31/2013    Procedure: TRANSFORAMINAL EPIDURAL - LUMBAR;  Surgeon: Joshua Dunne MD;  Location: [address] [place] [address]   • PATIENT DOCUMENTED NOT TO HAVE EXPERIENCED ANY OF THE FOLLOWING EVENTS  8/14/2013    Procedure: LUMBAR EPIDURAL; (two) times daily. , Disp: 60 tablet, Rfl: 0  Losartan Potassium 25 MG Oral Tab, Take 12.5 mg by mouth daily. , Disp: , Rfl:   atorvastatin 40 MG Oral Tab, Take 40 mg by mouth nightly., Disp: , Rfl:   potassium chloride 10 MEQ Oral Tab CR, Take 10 mEq by morgan rebound, guarding or organomegaly. Neurologic: No focal neurological deficits. CNII-XII grossly intact. Musculoskeletal: Moves all extremities. Extremities: No cyanosis. Right foot cellulitis with edema. Small nodule on the top of the foot.   Left hand current state of illness, I anticipate that, after discharge, patient will require TBD.             Electronically signed by Luis Carlos Murphy MD on 9/15/2021 10:54 PM              Consults - MD Consult Notes      Consults signed by Alexia Steven DPM at 5 abscess of unspecified site    • Coronary atherosclerosis of unspecified type of vessel, native or graft 6/29/2012   • Heart attack [facility])     NSTEMI   • Herpes zoster without mention of complication    • High blood pressure    • High cholesterol    • Hyper EPIDURAL/SUBARACHNOID; LUMBAR/SACRAL  7/31/2013    Procedure: TRANSFORAMINAL EPIDURAL - LUMBAR;  Surgeon: Jasson Ndiaye MD;  Location: [facility]   • INJECTION, W/WO CONTRAST, DX/THERAPEUTIC SUBSTANCE, EPIDURAL/SUBARACHNOID; LUMBAR/SA Heart Disease Father             • Heart Disorder Father    • Heart Disorder Paternal Grandmother       reports that he quit smoking about 44 years ago. His smoking use included cigarettes. He has a 36.00 pack-year smoking history.  He has never use consistent with possible hematoma. Vascular: Dorsalis pedis and posterior tibial pulses are lightly palpable to right foot--LE difficult to palpate secondary to edema. Dorsalis pedis and posterior tibial pulses are 1 out of 4 to left lower extremity. DIAG IMG ([id number])    Result Date: 9/16/2021  CONCLUSION:  1. No sonographic evidence for deep venous thrombosis involving the right lower extremity.     Dictated by (CST): Noel Hoffman MD on 9/16/2021 at 3:50 PM     Finalized by (CST): Noel Hoffman MD on 9/1 CHF, chronic (HCC)     Benign essential HTN     NSTEMI (non-ST elevated myocardial infarction) (HCC)     Obesity, morbid, BMI 40.0-49.9 (HCC)     DON on CPAP     Weakness     Mediastinal lymphadenopathy     Gallbladder stone without cholecystitis or obstru only)    No exam resulted this encounter.           Physical Therapy Notes (last 72 hours)      Physical Therapy Note signed by Elsa Amezquita PTA at 9/20/2021 10:57 AM  Version 1 of 1    Author: Elsa Amezquita PTA Service: Rehab Author Type: Ph • Personal history of other diseases of circulatory system    • Pre-operative cardiovascular examination    • Pulmonary vascular congestion     Bilateral anterior thigh-area pain.    • Pure hypercholesterolemia 6/29/2012   • Spinal stenosis    • Unspecifi Procedure: TRANSFORAMINAL EPIDURAL - LUMBAR;  Surgeon: Jenny Frost MD;  Location: [facility]   • PATIENT DOCUMENTED NOT TO HAVE EXPERIENCED ANY OF THE FOLLOWING EVENTS  7/31/2013    Procedure: TRANSFORAMINAL EPIDURAL - LUMBAR;  Surg WALK       AM-PAC '6-Clicks' INPATIENT SHORT FORM - BASIC MOBILITY  How much difficulty does the patient currently have. ..  -   Turning over in bed (including adjusting bedclothes, sheets and blankets)?: A Little   -   Sitting down on and standing up from to resolve. Pt is motivated to participate in skilled therapy and  will continue to benefit from ongoing IP PT to maximize functional independence.   The AM-PAC '6-Clicks' Inpatient Basic Mobility Short Form was completed and this patient is demonstrating a ED with cellulitis or R LE. C/o bruising and R foot pain. Bedside I&D performed 9/17. PMH: MS, A fib, CAD, HTN, HLD, PUD, DON on CPAP. More below.      Problem List  Principal Problem:    Cellulitis of right lower extremity  Active Problems:    Atrial f Procedure: TRANSFORAMINAL EPIDURAL - LUMBAR;  Surgeon: Yuridia Varner MD;  Location: [address] [place] [address] GISP & Lea Velasquez NDL/CATH SPI DX/THER NJX  8/14/2013    Procedure: LUMBAR EPIDURAL;  Surgeon: Yuridia Varner MD;  Location: [facility] PAIN MANAGEMENT   • PATIENT [place] PREOPERATIVE ORDER FOR IV ANTIBIOTIC SURGICAL SITE INFECTION PROPHYLAXIS.  7/31/2013    Procedure: TRANSFORAMINAL EPIDURAL - LUMBAR;  Surgeon: Tonja Langley MD;  Location: [address] [place] [address]   • PATIENT [facility] limits except for the following:    Right Shoulder flexion Limited less than 110 degrees  ;Strength 4/5  Left Shoulder flexion limited less than 110 deg  ;Strength 5/5    Lower extremity ROM is within functional limits except for the following:   DF/PF not LoB or dizziness. Good static /dynamic balance in sitting. Gait belt donned. Pt sit <> stand to RW, Min A x 1. Pt apprehensive about WB through RLE, but able to bear full weight, pain 3/10. Pt amb 10' ft in room to chair, Min A x 1.  Observable R si mechanics;  Energy conservation; Patient education; Gait training; Range of motion; Strengthening; Stoop training; Stair training; Transfer training; Balance training  Rehab Potential : Good  Frequency (Obs): 3-5x/week  Number of Visits to Meet Established additional Care Plan goals for specific interventions   Patient/Family Short Term Goal     Interdisciplinary Progressing    Description: Patient's Short Term Goal: have improvement to right foot    Interventions:   - IV abx  - See additional Care Plan goal show

## (undated) DIAGNOSIS — Z02.9 ENCOUNTERS FOR ADMINISTRATIVE PURPOSE: ICD-10-CM

## (undated) NOTE — IP AVS SNAPSHOT
[address]            (For Outpatient Use Only) Initial Admit Date: 8/5/2021   Inpt/Obs Admit Date: Inpt: 8/5/21 / Obs: N/A   Discharge Date:    Tanmay Captain:  [de-identified]   MRN: [de-identified]   CSN: [id number]   CEID: [id number] Payor:  Plan:    Group Number:  Insurance Type:    Subscriber Name:  Subscriber :    Subscriber ID:  Pt Rel to Subscriber:    Hospital Account Financial Class: Medicare    August 10, 2021

## (undated) NOTE — IP AVS SNAPSHOT
[address]            (For Outpatient Use Only) Initial Admit Date: 9/15/2021   Inpt/Obs Admit Date: Inpt: 9/15/21 / Obs: N/A   Discharge Date:    Shauna Reynolds:  [de-identified]   MRN: [de-identified]   CSN: [id number]   CEID: [id number] [place]   Group Number: [id number] Insurance Type: INDEMNITY   Subscriber Name: Harini Hemphill : 10/16/1950   Subscriber ID: [id number] Pt Rel to Subscriber: [facility] Account Financial Class: Medicare

## (undated) NOTE — LETTER
Anamika Pérez 182  [address] [facility] [address], [address] [facility]  Authorization for Surgical Operation and Procedure     Date:___________                                                                                                         Time:__________ reactions, hemolytic reactions, transmission of diseases such as Hepatitis, AIDS and Cytomegalovirus (CMV) and fluid overload. In the event that I wish to have an autologous transfusion of my own blood, or a directed donor transfusion.   I will discuss thi ends for purposes of reinstating the DNAR order.   10. Patients having a sterilization procedure: I understand that if the procedure is successful the results will be permanent and it will therefore be impossible for me to inseminate, conceive, or bear chil procedures as necessary. Some examples are: Starting or using an “IV” to give me medicine, fluids or blood during my procedure, and having a breathing tube placed to help me breathe when I’m asleep (intubation).  In the event that my heart stops working pro include headache, bleeding, infection, seizure, irregular heart rhythms, and nerve injury.     I can change my mind about having anesthesia services at any time before I get the medicine.    __________________________________________________________________

## (undated) NOTE — IP AVS SNAPSHOT
Patient Demographics     Address Phone E-mail Address    [address]   [address] [phone] (Home)  [phone] [facility] [email or web address]      Emergency Contact(s)     Name Relation Home Work [place] Spouse [phone] Commonly known as:  LIPITOR   Next dose due:  5/21/17        Take 1 tablet (40 mg total) by mouth nightly. Javad Balzarine                           CALCIUM 600 OR   Next dose due:  5/22/17        Take 1 Tab by mouth daily.                             Sole These medications [address], [phone], [address] [address] [address]    Hours:  24-hours Phone:  [phone]    - Amiodarone HCl 100 MG Tabs  - C Lab Results Last 24 Hours     No matching results found      Microbiology Results (All)     None         H&P - H&P Note      H&P by Jackie Stinson MD at 5/19/2017  3:53 AM  Version 1 of 1    Author:  Jackie Stinson MD Service:  (none) Author Type:  Physician • Pulmonary vascular congestion      Bilateral anterior thigh-area pain.    • Pre-operative cardiovascular examination    • Long term (current) use of anticoagulants    • Atrial fibrillation (HCC)    • Hyperlipidemia    • Coronary atherosclerosis of unspeci Agnes Mckeon MD;  Location: [facility]    PATIENT DOCUMENTED NOT TO HAVE EXPERIENCED ANY OF THE FOLLOWING EVENTS  7/31/2013    Comment Procedure: TRANSFORAMINAL EPIDURAL - LUMBAR;  Surgeon: Jeffery Cao MD;  Location: [address] [place] [address] • Heart Disorder Paternal Grandmother        Allergies: No Known Allergies    Medications:    No current facility-administered medications on file prior to encounter.   Current Outpatient Prescriptions on File Prior to Encounter:  LOSARTAN POTASSIUM 25 MG O Cardiovascular: S1, S2. Regular rate and rhythm. No murmurs, rubs or gallops. Equal pulses. Chest and Back: No tenderness or deformity. Abdomen: Soft, nontender, nondistended. Positive bowel sounds. No rebound, guarding or organomegaly.   Neurologic: No :  Hermes Toscano MD (Physician)       Consult Orders:    1.  IP Consult to Cardiology Once [[id number]] ordered by Elvira Jordan MD at 05/19/17 [address] [facility]  Report of Consultation    Surinder Smith Patient [address] Acute chest pain     Coronary artery disease involving native heart with unstable angina pectoris (HCC)     S/P CABG (coronary artery bypass graft)     Troponin I above reference range     Chronic atrial fibrillation (HCC)     Chronic bilateral back bambi daily. (Patient taking differently: Take 40 mg by mouth daily.  ) Disp: 180 tablet Rfl: 3   amiodarone HCl 200 MG Oral Tab Take 0.5 tablets (100 mg total) by mouth daily.  (Patient taking differently: Take 200 mg by mouth daily.  ) Disp: 45 tablet Rfl: 3 normoactive. No guarding or rebound. Extremities: Extremities do not demonstrate any evidence of peripheral edema. No cyanosis or clubbing of the digits is appreciated.   Femoral, Dorsalis Pedis, and Posterior Tibialis  pulses are 2+ and equal in a sym 5. Right atrium: The atrium was mildly to moderately dilated. Pacer C/W ICD     noted in right atrium.       Lab Results  Component Value Date   WBC 11.3 05/19/2017   HGB 13.9 05/19/2017   HCT 42.4 05/19/2017   .0 05/19/2017   CREATSERUM 1.11 05/19/2 Principal Problem:    Acute chest pain  Active Problems:    Elevated troponin    Dyspnea, unspecified type    At risk for falling    Unstable angina (HCC)    Systolic CHF, chronic (HCC)    Benign essential HTN      Past Medical History  Past Medical Histor Comment Procedure: TRANSFORAMINAL EPIDURAL - LUMBAR;  Surgeon: Essence Klein MD;  Location: [facility] FOR PAIN MANAGEMENT    INJECTION, W/WO CONTRAST, DX/THERAPEUTIC SUBSTANCE, EPIDURAL/SUBARACHNOID; LUMBAR/SACRAL  7/31/2013    Comment Procedure: TRANS Comment 1998 4CABG X 4    COLONOSCOPY  1/2016    Comment Dr Yarelis Vail due in 5 years    OTHER SURGICAL HISTORY  2000    Comment Fiberoptic Examination Gastroscopy w/cauterization    OTHER SURGICAL HISTORY  1/2016    Comment endoscopy due in 3 years Dr Yarelis Vail -   Moving to and from a bed to a chair (including a wheelchair)?: None   -   Need to walk in hospital room?: A Little   -   Climbing 3-5 steps with a railing?: A Little       AM-PAC Score:  Raw Score: 21   PT Approx Degree of Impairment Score: 28.97%   St correlates with limited community ambulator status and need for intervention in order to reduce fall risk. Despite these impairments the pt reports he is mobilizing at baseline and his impairments are chronic secondary to multiple sclerosis.   PT recommend

## (undated) NOTE — IP AVS SNAPSHOT
Patient Demographics     Address  [address] [place] [address] Phone  [phone] [facility]  [phone] [facility] E-mail Address  [email or web address]      Emergency Contact(s)     Name Relation Home Work Mobile    JeremieSavanna Spouse 538- tablet (20 mg total) by mouth daily. Jhonatan Eddy NP         Losartan Potassium 25 MG Tabs  Commonly known as: COZAAR  Next dose due: Tomorrow morning      Take 12.5 mg by mouth daily.           methylPREDNISolone 4 MG Tbpk  Commonly known as: MEDROL DOS 2026 Given      [id number] clopidogrel (PLAVIX) tab 75 mg 08/10/21 0810 Given      [id number] furosemide (LASIX) tab 20 mg 08/10/21 0810 Given      [id number] metoprolol tartrate (LOPRESSOR) tab 25 mg 08/09/21 2026 Given      [id number] metoprolol tartrate (LO leg. Pain started about Tuesday. Pain has progressed and now he is unable to ambulate. No fevers, chills. No n/v/abd pain. No diarrhea. No leg tingline. No loss of urine or bowl. No perineum numbness. No recent lifting.   No known chronic lower 7/31/2013    Procedure: TRANSFORAMINAL EPIDURAL - LUMBAR;  Surgeon: Alexia Mcmahan MD;  Location: [address] [place] [address] NDL/CATH SPI DX/THER Jesus Fairchild 82  8/14/2013    Procedure: LUMBAR EPIDURAL;  Surgeon: Alexia Mcmahan MD;  [facility]   • PATIENT [place] PREOPERATIVE ORDER FOR IV ANTIBIOTIC SURGICAL SITE INFECTION PROPHYLAXIS.  7/31/2013    Procedure: TRANSFORAMINAL EPIDURAL - LUMBAR;  Surgeon: Osbaldo Agrawal MD;  Location: [address] [place] [address] (OMEGA-3 OR), Take 1 capsule by mouth daily.   , Disp: , Rfl:   Ocrelizumab (OCREVUS) 300 MG/10ML Intravenous Solution, Inject 20 mL (600 mg total) into the vein every 6 (six) months., Disp: 2.8 mL, Rfl: 0  Calcium Carbonate (CALCIUM 600 OR), Take 1 Tab by BILT, TP in the last 168 hours. CrCl cannot be calculated (Patient's most recent lab result is older than the maximum 7 days allowed. ). No results for input(s): PTP, INR in the last 168 hours.     No results for input(s): TROP, CK in the last 168 hour History of Present Illness:      This is a 65year old male with past medical history significant for CAD, atrial fibrillation,systolic CHF with LV EF 02%, history of VT s/p AICD, multiple sclerosis, hypertension, history of spinal stenosis admitted to [facility] hypercholesterolemia 6/29/2012   • Unspecified sleep apnea     wears CPAP   • Unspecified urinary incontinence    • Ventricular arrhythmia    • Visual impairment          Past Surgical History:   Procedure Laterality Date   • APPENDECTOMY  1991   • CABG  1 EPIDURAL - LUMBAR;  Surgeon: Raúl Powell MD;  Location: [facility] PAIN MANA[facility]   • PATIENT DOCUMENTED NOT TO HAVE EXPERIENCED ANY OF THE FOLLOWING EVENTS  8/14/2013    Procedure: LUMBAR EPIDURAL;  Surgeon: Raúl Powell MD;  Location: [facility] supportive spouse.        @Meds@  No Known Allergies        Principal Problem:    Intractable back pain  Active Problems:    S/P implantation of automatic cardioverter/defibrillator (AICD)    CAD in native artery    Atrial fibrillation, chronic (HCC)      P spinal stenosis     Problem List  Principal Problem:    Intractable back pain  Active Problems:    S/P implantation of automatic cardioverter/defibrillator (AICD)    CAD in native artery    Atrial fibrillation, chronic [facility])      Past Medical History  Past LUMBAR EPIDURAL;  Surgeon: Yuridia Varner MD;  Location: [facility]   • INJECTION, ANESTHETIC/STEROID, TRANSFORAMINAL EPIDURAL; LUMBAR/SACRAL, SINGLE LEVEL  7/12/2013    Procedure: TRANSFORAMINAL EPIDURAL - LUMBAR;  Surgeon: Radha Caballero Greg Delong MD;  Location: [facility]   • [facility]OPERAT[facility] ORDER FOR IV ANTIBIOTIC SURGICAL SITE INFECTION PROPHYLAXIS.   8/14/2013    Procedure: LUMBAR EPIDURAL;  Surgeon: Luz Jiménez MD;  Location: [address] [place] [address] walker  Pattern:  (Decr balbir/step length/heel-toe pattern)  Stoop/Curb Assistance: Not tested       Skilled Therapy Provided: RN approved session, patient was received sitting up in the chair.   Patient performed sit<>stand transfers cga-SBA with the RW, assistance level: supervision      Goal #4  Patient ascend/descend full flight of stairs supervision with cane on the L side, R side railing, patient goes backwards when descending stairs-new goal today   Goal #5     Goal #6     Goal Comments: Goals establ without mention of complication    • High blood pressure    • High cholesterol    • Hyperlipidemia    • Hypertension    • Long term (current) use of anticoagulants    • Multiple sclerosis (HCC)    • Neuropathy    • Obesity, unspecified    • Other specified DX/THERAPEUTIC SUBSTANCE, EPIDURAL/SUBARACHNOID; LUMBAR/SACRAL  8/14/2013    Procedure: LUMBAR EPIDURAL;  Surgeon: Marlena Lovett MD;  Location: [facility] PAIN [facility]NA[facility]   • OPEN HEART SURGERY (PBP)      1998 4CABG X 4   • OTHER SURGICAL HISTORY  20 Weight Bearing Restriction: None                PAIN ASSESSMENT  Ratin  Location: R LE  Management Techniques: Activity promotion;Repositioning; Body mechanics     ACTIVITIES OF DAILY LIVING ASSESSMENT  AM-PAC ‘6-Clicks’ Inpatient Daily Activity Short OT goals with improvements in functional mobility and ADL however patient remains below his baseline in these and other functional areas.  Patient would benefit from continued OT services during hospital stay to further address these deficits and assist pat Patient/Family Goals    Goal Priority Disciplines Outcome Interventions   Patient/Family Long Term Goal     Interdisciplinary Progressing    Description: Patient's Long Term Goal: \"To have pain gone\"    Interventions:  -take pain meds as directed  -eat a

## (undated) NOTE — LETTER
04/12/21        Cheril Breeding  [address] [place] [address]      Dear Gato Sethi,    [address] [facility] records indicate that you have outstanding lab work and or testing that was ordered for you and has not yet been completed:  Orders Placed This Encounter

## (undated) NOTE — LETTER
8/3/2018    Fausto Fontana  [address] [facility]   [address] [facility] [address]    Dear Mr. Martha Goodman,     Please contact our office to schedule a Medicare annaul visit with your doctor after 11/03/2018 to address important healthcare needs.   It is important that you

## (undated) NOTE — MR AVS SNAPSHOT
[facility], [address] [phone]               Thank you for choosing us for your health care visit with Paarg Farooq MD.  We are glad to serve you and happy to provide you with this brewer BP Pulse Temp Height Weight BMI    120/68 mmHg 68 98.7 °F (37.1 °C) (Oral) 67\" 261 lb 40.87 kg/m2         Current Medications          This list is accurate as of: 5/26/17  1:31 PM.  Always use your most recent med list.                Amiodarone HCl 100 office, you can view your past visit information in [employer] by going to Visits < Visit Summaries. RingTu questions? Call [phone] for help. RingTu is NOT to be used for urgent needs. For medical emergencies, dial 911.         Educational Inform

## (undated) NOTE — LETTER
Your patient was recently seen at the [facility] for a hospital follow-up visit. The visit note is attached. Please contact the clinic with any questions at [phone].     Thank you,  MARC Zurita

## (undated) NOTE — IP AVS SNAPSHOT
[facility] [place]  [address] [place], [address] [place] [address] ~ [phone]                Discharge Summary   5/19/2017    Margaret Middleton           Admission Information        Provider Department    5/19/2017 Claire Sommers MD  [id number] Hay - Another medication with the same name was removed. Continue taking this medication, and follow the directions you see here. Next dose due:  5/22/17        Take 1 tablet (100 mg total) by mouth daily.     Aaron Montes De Oca                           aspirin Last time this was given:  25 mg on 5/21/2017  9:12 AM   Commonly known as:  COZAAR   Next dose due:  5/22/17        Take 25 mg by mouth daily. MULTI VITAMIN MENS OR   Next dose due:  5/22/17        Take 1 Tab by mouth daily. Follow up with Giovanna Abarca MD. Schedule an appointment as soon as possible for a visit in 1 week.     Specialties:  Cardiovascular Diseases, CARDIOLOGY    Why:  If symptoms worsen, For wound re-check    Contact information:    [phone]    (05/19/17)  32.3 (05/19/17)  50.6 (05/19/17)  9.6 (05/19/17)  5.1 (05/19/17)  0.9 -- (05/19/17)  3.82 (05/19/17)  5.99 (H) (05/19/17)  1.14 (H) (05/19/17)  0.60 (H) (05/19/17)  6.78 (H)      Metabolic Lab Results  (Last result in the pas [email or web address]. If you've recently had a stay at the Hospital you can access your discharge instructions in [employer] by going to Visits < Admission Summaries.  If you've been to the Emergency Department or your doctor's office, you can view yo Use: Lower cholesterol, protect your heart   Most common side effects: Dizziness, constipation, abnormal liver function   What to report to your healthcare team:  Dizziness, muscle aches, constipation           Other Blood Thinners     Platelet Aggregatio

## (undated) NOTE — Clinical Note
Pt will be coming in for HFU appt on 5/26. He states he is feeling much better since coming home. Thank you!